# Patient Record
Sex: FEMALE | Race: WHITE | NOT HISPANIC OR LATINO | Employment: OTHER | URBAN - METROPOLITAN AREA
[De-identification: names, ages, dates, MRNs, and addresses within clinical notes are randomized per-mention and may not be internally consistent; named-entity substitution may affect disease eponyms.]

---

## 2017-01-19 ENCOUNTER — PATIENT OUTREACH (OUTPATIENT)
Dept: ADMINISTRATIVE | Facility: HOSPITAL | Age: 57
End: 2017-01-19

## 2017-01-19 NOTE — LETTER
January 27, 2017    Tanya Espinoza  [address] [address]             [facility]  [address]  [place] [address]  Phone: [phone] Dear Nghia Espinoza:    We have tried to reach you by mychart unsuccessfully.        [facility] is committed to your overall health.  To help you get the most out of each of your visits, we will review your information to make sure you are up to date on all of your recommended tests and/or procedures.       Dr. Yen has found that you may be due for     One-time Hepatitis C Screening lab test(a viral condition that can harm the liver)   Pneumonia vaccine   Pap smear   Mammogram   Colonoscopy     If you have had any of the above done at another facility, please bring the records or information with you so that your record at [facility] will be complete.     If you are currently taking medication, please bring it with you to your appointment for review.     Michael Ceballos LPN Clinical Care Coordinator   [facility] [address] [facility] [address]   [place] [address]   [phone] (p)   [phone] (f)

## 2017-01-23 RX ORDER — DEXTROAMPHETAMINE SACCHARATE, AMPHETAMINE ASPARTATE, DEXTROAMPHETAMINE SULFATE AND AMPHETAMINE SULFATE 5; 5; 5; 5 MG/1; MG/1; MG/1; MG/1
20 TABLET ORAL DAILY
Qty: 30 TABLET | Refills: 0 | Status: SHIPPED | OUTPATIENT
Start: 2017-01-23 | End: 2017-02-20 | Stop reason: SDUPTHER

## 2017-01-23 RX ORDER — DEXTROAMPHETAMINE SACCHARATE, AMPHETAMINE ASPARTATE MONOHYDRATE, DEXTROAMPHETAMINE SULFATE AND AMPHETAMINE SULFATE 7.5; 7.5; 7.5; 7.5 MG/1; MG/1; MG/1; MG/1
30 CAPSULE, EXTENDED RELEASE ORAL EVERY MORNING
Qty: 30 CAPSULE | Refills: 0 | Status: SHIPPED | OUTPATIENT
Start: 2017-01-23 | End: 2017-02-20 | Stop reason: SDUPTHER

## 2017-01-23 NOTE — TELEPHONE ENCOUNTER
----- Message from RT Michel sent at 1/23/2017  8:30 AM CST -----  Contact: pt    pt , requesting medication refill on adderal XR and instant relief, thanks.

## 2017-02-20 RX ORDER — DEXTROAMPHETAMINE SACCHARATE, AMPHETAMINE ASPARTATE, DEXTROAMPHETAMINE SULFATE AND AMPHETAMINE SULFATE 5; 5; 5; 5 MG/1; MG/1; MG/1; MG/1
20 TABLET ORAL DAILY
Qty: 30 TABLET | Refills: 0 | Status: SHIPPED | OUTPATIENT
Start: 2017-02-20 | End: 2017-03-17 | Stop reason: SDUPTHER

## 2017-02-20 RX ORDER — DEXTROAMPHETAMINE SACCHARATE, AMPHETAMINE ASPARTATE MONOHYDRATE, DEXTROAMPHETAMINE SULFATE AND AMPHETAMINE SULFATE 7.5; 7.5; 7.5; 7.5 MG/1; MG/1; MG/1; MG/1
30 CAPSULE, EXTENDED RELEASE ORAL EVERY MORNING
Qty: 30 CAPSULE | Refills: 0 | Status: SHIPPED | OUTPATIENT
Start: 2017-02-20 | End: 2017-03-17 | Stop reason: SDUPTHER

## 2017-02-20 NOTE — TELEPHONE ENCOUNTER
----- Message from Marielle Vaughn sent at 2/20/2017  9:36 AM CST -----  Contact: patient  Patient calling in regards to requesting a refill for Adderral. Please call when ready for . Please advise.   Call back .  Thanks!

## 2017-03-02 ENCOUNTER — PATIENT OUTREACH (OUTPATIENT)
Dept: ADMINISTRATIVE | Facility: HOSPITAL | Age: 57
End: 2017-03-02

## 2017-03-02 NOTE — LETTER
March 10, 2017    Tanya Espinoza  [address] [address]             [facility]  [address]  [place] [address]  Phone: [phone] Dear Nghia Espinoza:    We have tried to reach you by mychart unsuccessfully.        [facility] is committed to your overall health.  To help you get the most out of each of your visits, we will review your information to make sure you are up to date on all of your recommended tests and/or procedures.       Dr. Yen has found that you may be due for One time Hepatitis C screening lab test ( a viral condition that harms the liver), pneumonia vaccine, pap smear, mammogram, colonoscopy     If you have had any of the above done at another facility, please bring the records or information with you so that your record at [facility] will be complete.     If you are currently taking medication, please bring it with you to your appointment for review.     Michael Ceballos LPN Clinical Care Coordinator   [facility] [address] [facility]   [place] [address]   [phone] (p)   [phone] (f)

## 2017-03-17 ENCOUNTER — OFFICE VISIT (OUTPATIENT)
Dept: FAMILY MEDICINE | Facility: CLINIC | Age: 57
End: 2017-03-17
Payer: COMMERCIAL

## 2017-03-17 VITALS
WEIGHT: 190.94 LBS | BODY MASS INDEX: 28.94 KG/M2 | DIASTOLIC BLOOD PRESSURE: 89 MMHG | HEART RATE: 75 BPM | SYSTOLIC BLOOD PRESSURE: 129 MMHG | HEIGHT: 68 IN

## 2017-03-17 DIAGNOSIS — I48.20 CHRONIC A-FIB: ICD-10-CM

## 2017-03-17 DIAGNOSIS — F90.9 ATTENTION DEFICIT HYPERACTIVITY DISORDER (ADHD), UNSPECIFIED ADHD TYPE: ICD-10-CM

## 2017-03-17 DIAGNOSIS — I10 ESSENTIAL HYPERTENSION: Primary | ICD-10-CM

## 2017-03-17 DIAGNOSIS — F17.200 SMOKER: ICD-10-CM

## 2017-03-17 PROCEDURE — 99214 OFFICE O/P EST MOD 30 MIN: CPT | Mod: S$GLB,,, | Performed by: FAMILY MEDICINE

## 2017-03-17 PROCEDURE — 1160F RVW MEDS BY RX/DR IN RCRD: CPT | Mod: S$GLB,,, | Performed by: FAMILY MEDICINE

## 2017-03-17 PROCEDURE — 99999 PR PBB SHADOW E&M-EST. PATIENT-LVL III: CPT | Mod: PBBFAC,,, | Performed by: FAMILY MEDICINE

## 2017-03-17 PROCEDURE — 3074F SYST BP LT 130 MM HG: CPT | Mod: S$GLB,,, | Performed by: FAMILY MEDICINE

## 2017-03-17 PROCEDURE — 3079F DIAST BP 80-89 MM HG: CPT | Mod: S$GLB,,, | Performed by: FAMILY MEDICINE

## 2017-03-17 RX ORDER — METOPROLOL TARTRATE 25 MG/1
TABLET, FILM COATED ORAL
COMMUNITY
Start: 2017-02-26 | End: 2017-03-17 | Stop reason: SDUPTHER

## 2017-03-17 RX ORDER — METOPROLOL SUCCINATE 25 MG/1
TABLET, EXTENDED RELEASE ORAL
COMMUNITY
Start: 2017-03-15 | End: 2017-06-06

## 2017-03-17 RX ORDER — DEXTROAMPHETAMINE SACCHARATE, AMPHETAMINE ASPARTATE, DEXTROAMPHETAMINE SULFATE AND AMPHETAMINE SULFATE 5; 5; 5; 5 MG/1; MG/1; MG/1; MG/1
20 TABLET ORAL DAILY
Qty: 30 TABLET | Refills: 0 | Status: SHIPPED | OUTPATIENT
Start: 2017-03-17 | End: 2017-04-12 | Stop reason: SDUPTHER

## 2017-03-17 RX ORDER — DEXTROAMPHETAMINE SACCHARATE, AMPHETAMINE ASPARTATE MONOHYDRATE, DEXTROAMPHETAMINE SULFATE AND AMPHETAMINE SULFATE 7.5; 7.5; 7.5; 7.5 MG/1; MG/1; MG/1; MG/1
30 CAPSULE, EXTENDED RELEASE ORAL EVERY MORNING
Qty: 30 CAPSULE | Refills: 0 | Status: SHIPPED | OUTPATIENT
Start: 2017-03-17 | End: 2017-04-12 | Stop reason: SDUPTHER

## 2017-03-17 NOTE — MR AVS SNAPSHOT
[facility]  [address]  [facility] [address]  Phone: [phone]  Fax: [phone]                  Tanya Espinoza   3/17/2017 7:00 AM   Office Visit    Description:  Female : 1960   Provider:  Loki Yen MD   Department:  [facility]           Reason for Visit     Follow-up           Diagnoses this Visit        Comments    Essential hypertension    -  Primary            To Do List           Future Appointments        Provider Department Dept Phone    2017 8:00 AM Loki Yen MD [facility] [phone]      Goals (5 Years of Data)     None      Follow-Up and Disposition     Return in about 3 months (around 2017).       These Medications        Disp Refills Start End    dextroamphetamine-amphetamine (ADDERALL XR) 30 MG 24 hr capsule 30 capsule 0 3/17/2017     Take 1 capsule (30 mg total) by mouth every morning. - Oral    Pharmacy: [facility] #[id number] - [facility] [address] [facility] [address] [facility] [address] Ph #: [phone]       dextroamphetamine-amphetamine (ADDERALL) 20 mg tablet 30 tablet 0 3/17/2017     Take 1 tablet (20 mg total) by mouth once daily. - Oral    Pharmacy: [facility]/pharmacy #[id number] - [place] [address] [facility] [address] Ph #: [phone]         [facility]     [facility] Care Line -  Assistance  Registered nurses in the [facility] provide clinical advisement, health education, appointment booking, and other advisory services.  Call for this free service at [phone].             Medications           STOP taking these medications     metoprolol tartrate (LOPRESSOR) 25 MG tablet            Verify that the below list of medications is an accurate representation of the medications you are currently taking.  If none reported, the list may be blank. If incorrect, please contact your healthcare provider. Carry this list with you in case of emergency.           Current Medications      "dextroamphetamine-amphetamine (ADDERALL XR) 30 MG 24 hr capsule Take 1 capsule (30 mg total) by mouth every morning.    dextroamphetamine-amphetamine (ADDERALL) 20 mg tablet Take 1 tablet (20 mg total) by mouth once daily.    ibuprofen (ADVIL,MOTRIN) 400 MG tablet Take 1 tablet (400 mg total) by mouth 3 (three) times daily as needed for Other.    metoprolol succinate (TOPROL-XL) 25 MG 24 hr tablet     multivitamin (MULTIVITAMIN) per tablet Take 1 tablet by mouth once daily.      NAPROXEN SODIUM (ALEVE ORAL) Take 1 tablet by mouth every 12 (twelve) hours as needed.             Clinical Reference Information           Your Vitals Were     BP Pulse Height Weight BMI    129/89 75 5' 8" (1.727 m) 86.6 kg (190 lb 14.7 oz) 29.03 kg/m2      Blood Pressure          Most Recent Value    BP  129/89      Allergies as of 3/17/2017     Effexor [Venlafaxine]      Immunizations Administered on Date of Encounter - 3/17/2017     None      Orders Placed During Today's Visit     Future Labs/Procedures Expected by Expires    Comprehensive metabolic panel  9/13/2017 5/16/2018    Lipid panel  9/13/2017 3/17/2018      Smoking Cessation     If you would like to quit smoking:   You may be eligible for free services if you are a [place] resident and started smoking cigarettes before September 1, 1988.  Call the Smoking Cessation Trust ([facility]) toll free at (723) 210-1401 or (033) 126-0652.   Call 1-800-QUIT-NOW if you do not meet the above criteria.            Language Assistance Services     ATTENTION: Language assistance services are available, free of charge. Please call [phone].      ATENCIÓN: Si habla gato, tiene a ch disposición servicios gratuitos de asistencia lingüística. Llame al [phone].     CHÚ Ý: N?u b?n nói Ti?ng Vi?t, có các d?ch v? h? tr? ngôn ng? mi?n phí dành cho b?n. G?i s? [phone].         [facility] complies with applicable Federal civil rights laws and does not discriminate on " the basis of race, color, national origin, age, disability, or sex.

## 2017-03-26 NOTE — PROGRESS NOTES
A pleasant female, 57 years of age.  She has hypertension, smoking, atrial fib,   ADHD.  We addressed each issue, reviewed her medications.  We discussed health   maintenance and cancer screening.  She is due for some laboratory work.  She has   signed a controlled medication contract.  No syncope, chest pain or shortness   of breath.  No nausea or vomiting.  No polyuria, polydipsia or polyphagia.  No   acute psychiatric issues.    PHYSICAL EXAMINATION:  VITAL SIGNS:  BMI 29.  GENERAL:  Pleasant female.  Under a lot of stress with her family.  She was   polite and pleasant as usual.  NECK:  No carotid bruits.  CHEST:  Clear.  EXTREMITIES:  No peripheral edema.  ABDOMEN:  Soft, nontender.  No scleral icterus, jaundice or hepatosplenomegaly.    ASSESSMENT:  Hypertension, smoking, atrial fib and ADHD.    PLAN:  She is doing well overall.  She is due for CMP and lipids, smoking   cessation and followup.      NAN/JUAN RAMON  dd: 03/26/2017 19:01:49 (CDT)  td: 03/27/2017 00:58:48 (CDT)  Doc ID   #[id number]  Job ID #[id number]    CC:

## 2017-04-12 RX ORDER — DEXTROAMPHETAMINE SACCHARATE, AMPHETAMINE ASPARTATE MONOHYDRATE, DEXTROAMPHETAMINE SULFATE AND AMPHETAMINE SULFATE 7.5; 7.5; 7.5; 7.5 MG/1; MG/1; MG/1; MG/1
30 CAPSULE, EXTENDED RELEASE ORAL EVERY MORNING
Qty: 30 CAPSULE | Refills: 0 | Status: SHIPPED | OUTPATIENT
Start: 2017-04-17 | End: 2017-05-17 | Stop reason: SDUPTHER

## 2017-04-12 RX ORDER — DEXTROAMPHETAMINE SACCHARATE, AMPHETAMINE ASPARTATE, DEXTROAMPHETAMINE SULFATE AND AMPHETAMINE SULFATE 5; 5; 5; 5 MG/1; MG/1; MG/1; MG/1
20 TABLET ORAL DAILY
Qty: 30 TABLET | Refills: 0 | Status: SHIPPED | OUTPATIENT
Start: 2017-04-17 | End: 2017-05-11 | Stop reason: SDUPTHER

## 2017-04-12 NOTE — TELEPHONE ENCOUNTER
----- Message from Edel Stanley sent at 4/12/2017 10:00 AM CDT -----  Contact: self  Patient requesting refill on Adderall 30 mg and 20 mg called to [facility] on [address] in [place]   If any questions please call      Thanks

## 2017-04-14 ENCOUNTER — NURSE TRIAGE (OUTPATIENT)
Dept: ADMINISTRATIVE | Facility: CLINIC | Age: 57
End: 2017-04-14

## 2017-04-14 NOTE — TELEPHONE ENCOUNTER
"  Reason for Disposition   Caller has medication question only, adult not sick, and triager answers question    Answer Assessment - Initial Assessment Questions  1. SYMPTOMS: "Do you have any symptoms?"      -  2. SEVERITY: If symptoms are present, ask "Are they mild, moderate or severe?"      -  Patient states Adderall has the wrong start date. She states the start date should be 4/14/17, instead the date is 4/17/17. Patient states pharmacy will not fill medication. Advised patient that medication is a controlled medication and requires a paper prescription. Patient advised to go to urgent care or the wait until Monday for refill.    Protocols used: ST MEDICATION QUESTION CALL-A-AH    "

## 2017-05-11 RX ORDER — DEXTROAMPHETAMINE SACCHARATE, AMPHETAMINE ASPARTATE MONOHYDRATE, DEXTROAMPHETAMINE SULFATE AND AMPHETAMINE SULFATE 7.5; 7.5; 7.5; 7.5 MG/1; MG/1; MG/1; MG/1
30 CAPSULE, EXTENDED RELEASE ORAL EVERY MORNING
Qty: 30 CAPSULE | Refills: 0 | Status: CANCELLED | OUTPATIENT
Start: 2017-05-11

## 2017-05-11 NOTE — TELEPHONE ENCOUNTER
----- Message from Prisca Agrawal sent at 5/11/2017 12:24 PM CDT -----  Patient needs a refill of medications:Adderall (two prescriptions)please call patient back at [phone] to confirm.

## 2017-05-15 RX ORDER — DEXTROAMPHETAMINE SACCHARATE, AMPHETAMINE ASPARTATE, DEXTROAMPHETAMINE SULFATE AND AMPHETAMINE SULFATE 5; 5; 5; 5 MG/1; MG/1; MG/1; MG/1
20 TABLET ORAL DAILY
Qty: 30 TABLET | Refills: 0 | Status: SHIPPED | OUTPATIENT
Start: 2017-05-15 | End: 2017-06-09 | Stop reason: SDUPTHER

## 2017-05-15 RX ORDER — DEXTROAMPHETAMINE SACCHARATE, AMPHETAMINE ASPARTATE MONOHYDRATE, DEXTROAMPHETAMINE SULFATE AND AMPHETAMINE SULFATE 7.5; 7.5; 7.5; 7.5 MG/1; MG/1; MG/1; MG/1
30 CAPSULE, EXTENDED RELEASE ORAL EVERY MORNING
Qty: 30 CAPSULE | Refills: 0 | Status: CANCELLED | OUTPATIENT
Start: 2017-05-15

## 2017-05-15 NOTE — TELEPHONE ENCOUNTER
----- Message from Prisca Agrawal sent at 5/15/2017  1:41 PM CDT -----  Patient calling back concerning request for prescription refill/has not heard from anyone/patient is out of medication: Adderall/please call back at [phone] to advise.

## 2017-05-17 RX ORDER — DEXTROAMPHETAMINE SACCHARATE, AMPHETAMINE ASPARTATE MONOHYDRATE, DEXTROAMPHETAMINE SULFATE AND AMPHETAMINE SULFATE 7.5; 7.5; 7.5; 7.5 MG/1; MG/1; MG/1; MG/1
30 CAPSULE, EXTENDED RELEASE ORAL EVERY MORNING
Qty: 30 CAPSULE | Refills: 0 | Status: SHIPPED | OUTPATIENT
Start: 2017-05-17 | End: 2017-06-09 | Stop reason: SDUPTHER

## 2017-05-17 NOTE — TELEPHONE ENCOUNTER
----- Message from Marlene Hampton sent at 5/17/2017  1:53 PM CDT -----  Contact: [phone]  Patient requesting a refill on adderall time release.    Patient will be using   [facility]  [place] - [address]  [address] [place] [address]  Phone: [phone] Fax: [phone]    Please call patient at [phone]. Thanks!

## 2017-05-17 NOTE — TELEPHONE ENCOUNTER
----- Message from Prisca Agrawal sent at 5/17/2017  7:55 AM CDT -----  Patient needs a refill of medication: Adderall (time release)called into [facility]. Please order and call patient back at [phone] to confirm. Thanks!

## 2017-06-06 ENCOUNTER — OFFICE VISIT (OUTPATIENT)
Dept: PRIMARY CARE CLINIC | Facility: CLINIC | Age: 57
End: 2017-06-06
Payer: COMMERCIAL

## 2017-06-06 VITALS
OXYGEN SATURATION: 97 % | WEIGHT: 191.81 LBS | SYSTOLIC BLOOD PRESSURE: 120 MMHG | DIASTOLIC BLOOD PRESSURE: 90 MMHG | TEMPERATURE: 98 F | BODY MASS INDEX: 29.07 KG/M2 | HEART RATE: 96 BPM | HEIGHT: 68 IN

## 2017-06-06 DIAGNOSIS — R05.9 COUGH: ICD-10-CM

## 2017-06-06 DIAGNOSIS — J01.00 ACUTE MAXILLARY SINUSITIS, RECURRENCE NOT SPECIFIED: Primary | ICD-10-CM

## 2017-06-06 PROCEDURE — 99213 OFFICE O/P EST LOW 20 MIN: CPT | Mod: S$GLB,,, | Performed by: NURSE PRACTITIONER

## 2017-06-06 PROCEDURE — 99999 PR PBB SHADOW E&M-EST. PATIENT-LVL IV: CPT | Mod: PBBFAC,,, | Performed by: NURSE PRACTITIONER

## 2017-06-06 RX ORDER — PROMETHAZINE HYDROCHLORIDE AND CODEINE PHOSPHATE 6.25; 1 MG/5ML; MG/5ML
5 SOLUTION ORAL NIGHTLY PRN
Qty: 120 ML | Refills: 0 | Status: SHIPPED | OUTPATIENT
Start: 2017-06-06 | End: 2017-06-16

## 2017-06-06 RX ORDER — DEXTROAMPHETAMINE SACCHARATE, AMPHETAMINE ASPARTATE, DEXTROAMPHETAMINE SULFATE AND AMPHETAMINE SULFATE 5; 5; 5; 5 MG/1; MG/1; MG/1; MG/1
20 TABLET ORAL DAILY
Qty: 30 TABLET | Refills: 0 | Status: CANCELLED | OUTPATIENT
Start: 2017-06-06

## 2017-06-06 RX ORDER — DEXTROAMPHETAMINE SACCHARATE, AMPHETAMINE ASPARTATE MONOHYDRATE, DEXTROAMPHETAMINE SULFATE AND AMPHETAMINE SULFATE 7.5; 7.5; 7.5; 7.5 MG/1; MG/1; MG/1; MG/1
30 CAPSULE, EXTENDED RELEASE ORAL EVERY MORNING
Qty: 30 CAPSULE | Refills: 0 | Status: CANCELLED | OUTPATIENT
Start: 2017-06-06

## 2017-06-06 RX ORDER — DOXYCYCLINE 100 MG/1
100 CAPSULE ORAL 2 TIMES DAILY
Qty: 14 CAPSULE | Refills: 0 | Status: SHIPPED | OUTPATIENT
Start: 2017-06-06 | End: 2017-07-05

## 2017-06-06 RX ORDER — BENZONATATE 200 MG/1
200 CAPSULE ORAL 3 TIMES DAILY PRN
Qty: 30 CAPSULE | Refills: 1 | Status: SHIPPED | OUTPATIENT
Start: 2017-06-06 | End: 2017-06-16

## 2017-06-06 NOTE — Clinical Note
Loki Yen MD,  I saw your patient today in the [facility].  If you have any questions, please do not hesitate to contact me.  Thank you!  BREANNE Pettit

## 2017-06-06 NOTE — PROGRESS NOTES
Tanya Espinoza is a 57 y.o. female patient of Loki Yen MD who presents to the clinic today for   Chief Complaint   Patient presents with    Nasal Congestion     past week    Chest Congestion    Cough    Fatigue   .    HPI    Pt, who is not known to me, reports a new problem to me:  Nasal and chest congestion, cough, fatigue.  Started with congestion in the chest--cough prod of bad-tasting mucus.  No longer able to cough up the mucus.  Nasal congestion and pressure.  Rare nasal congestion-yellow phlegm.  No ST, earache.  Pressure in the ear.    These symptoms began 2+ weeks  ago and status is worsening over 4-5 days.     Symptoms are + acute.    Pt denies the following symptoms:  fever    Aggravating factors include nothing .    Relieving factors include sleeping .    OTC Medications tried are robitussin, Dayquil--helped a little with the cough, Nasonex.    Prescription medications taken for symptoms are none.    Pertinent medical history:  No h/o sinus or lung problems, no h/o allergies.    Smoking status:  1 PPD continues, dec since ill    ROS    Constitutional:   No  fever, ++ fatigue, decrease in appetite.    Head:   + headache  Ears:   No pain.  Eyes:  No sxs  Nose:   + sinus pain, + congestion, + runny nose, + post nasal drip.  Throat:  + ST pain.    Heart:  No palpitations, chest pain.    Lungs:  + difficulty breathing if active, + coughing, + bad-tasting sputum production, not wheezing.              Symptoms are community acquired.  No sick contact known.    GI/:  No sxs    MS:  No new bone, joint or muscle problems.    Skin:  No rashes, itching.      PAST MEDICAL HISTORY:  Past Medical History:   Diagnosis Date    ADHD (attention deficit hyperactivity disorder)     Anxiety        PAST SURGICAL HISTORY:  Past Surgical History:   Procedure Laterality Date    TONSILLECTOMY      TUBAL LIGATION         SOCIAL HISTORY:  Social History     Social History    Marital status:      Spouse  name: N/A    Number of children: N/A    Years of education: N/A     Occupational History    Not on file.     Social History Main Topics    Smoking status: Current Every Day Smoker     Packs/day: 1.00     Types: Cigarettes    Smokeless tobacco: Never Used    Alcohol use Yes      Comment: rarely    Drug use: No    Sexual activity: Not on file     Other Topics Concern    Not on file     Social History Narrative    No narrative on file       FAMILY HISTORY:  Family History   Problem Relation Age of Onset    Diabetes Mother     Osteoporosis Maternal Grandmother        ALLERGIES AND MEDICATIONS: updated and reviewed.  Review of patient's allergies indicates:   Allergen Reactions    Effexor [venlafaxine] Other (See Comments)     Bruising all over body and elevated blood pressure     Current Outpatient Prescriptions   Medication Sig Dispense Refill    dextroamphetamine-amphetamine (ADDERALL XR) 30 MG 24 hr capsule Take 1 capsule (30 mg total) by mouth every morning. 30 capsule 0    dextroamphetamine-amphetamine (ADDERALL) 20 mg tablet Take 1 tablet (20 mg total) by mouth once daily. 30 tablet 0    ibuprofen (ADVIL,MOTRIN) 400 MG tablet Take 1 tablet (400 mg total) by mouth 3 (three) times daily as needed for Other. 30 tablet 0    multivitamin (MULTIVITAMIN) per tablet Take 1 tablet by mouth once daily.        NAPROXEN SODIUM (ALEVE ORAL) Take 1 tablet by mouth every 12 (twelve) hours as needed.         No current facility-administered medications for this visit.        PHYSICAL EXAM    Alert, coop 57 y.o. female patient in no acute distress, appears fatigued.    Vitals:    06/06/17 1400   BP: (!) 120/90   Pulse: 96   Temp: 98.4 °F (36.9 °C)     VS reviewed.  VS stable.  CC, nursing note, medications & PMH all reviewed today.    Head:  Normocephalic, atraumatic.    EENT:  EACs patent.  TMs no erythema, normal LR, no effusions, no TM perforation.                              Eye lids normal, no discharge  present.       Sinus tenderness to palp--none.               Nares--no edema.               Pharynx not injected.                Tonsils not erythematous , not enlarged, no exudate present.    Bilat small anterior, no posterior cervical lymph nodes palpable.    No submental, submandibular or supraclavicular lymph nodes palp.             Resp:  Respirations even, unlabored.  Harsh coughing during the visit.   Lungs CTA bilat.  No wheezing.  No crackles.  Moves air to bases bilat.    Heart:  RRR, no MRG.    MS:  Ambulates normally.             NEURO:  Alert and oriented x 4.  Responds appropriately during interaction.    Skin:  Warm, dry, color good.    Acute maxillary sinusitis, recurrence not specified  -     doxycycline (MONODOX) 100 MG capsule; Take 1 capsule (100 mg total) by mouth 2 (two) times daily.  Dispense: 14 capsule; Refill: 0    Cough  -     benzonatate (TESSALON) 200 MG capsule; Take 1 capsule (200 mg total) by mouth 3 (three) times daily as needed for Cough.  Dispense: 30 capsule; Refill: 1  -     promethazine-codeine 6.25-10 mg/5 ml (PHENERGAN WITH CODEINE) 6.25-10 mg/5 mL syrup; Take 5 mLs by mouth nightly as needed for Cough.  Dispense: 120 mL; Refill: 0      Pt today presents with 2+ weeks of coughing and congestion, fatigue, that has worsened.    This is a new problem to me.  No work up is planned.        Pt advised to perform comfort measures recommended on patient instruction sheet .    If not better in 3-5 days, the patient is advised to call us.  If worse or concerns, the patient is advised to call us.  Explained exam findings, diagnosis and treatment plan to patient.  Questions answered and patient states understanding.

## 2017-06-06 NOTE — PATIENT INSTRUCTIONS
Sinus infections have many causes, including viral and bacterial infections.    After nearly 2 weeks, antibiotics are indicated.  Fill the prescription for doxycycline.    Plain mucinex and antihistamine during the daytime with the benzonatate cough capsules.  At bedtime only the cough medicine as needed.    The following medications have been prescribed for you today:  Flonase or Nasonex nasal spray to reduce swelling and congestion in the sinuses.    In addition, the following symptomatic measures may be helpful:    HOME CARE(1):  · Drink plenty of water, hot tea, and other liquids to stay well hydrated. This thins the mucus and promotes sinus drainage.  · Apply heat to the painful areas of the face. Use a towel soaked in warm water. Or,  the shower and direct the hot spray onto your face. This is a good way to inhale warm water vapor and get heat on your face at the same time. (Cover your mouth and nose with your hands so you can still breathe as you do this.)  · Use a vaporizer with products such as Vicks VapoRub (contains menthol) at night. Suck on peppermint, menthol or eucalyptus hard candies during the day.  · Saline nasal spray liberally.  · Salt water gargles  · Sleep with head elevated  · Decongestants if you don't have high blood pressure.  People with high blood pressure should use cold medications with the HBP designation, as these are safe for their use.  · An expectorant containing guaifenesin (such as Robitussin, Mucinex), helps to thin the mucus and promote drainage from the sinuses.  · Antihistamines are useful if allergies are a cause of your sinusitis. The mildest one is chlorpheniramine (available without a prescription). The dose for adults is 8-12mg three times a day. [NOTE: Do not use chlorpheniramine if you have glaucoma or if you are a man with trouble urinating due to an enlarged prostate.] Claritin (loratidine) is an antihistamine that causes less drowsiness and is a good  alternative for daytime use.  Allegra is another medication that is similar.  · You may use acetaminophen (Tylenol) or ibuprofen (Motrin, Advil) to control pain, unless another pain medicine was prescribed. [ NOTE: If you have chronic liver or kidney disease or ever had a stomach ulcer, talk with your doctor before using these medicines.] (Aspirin should never be used in anyone under 18 years of age who is ill with a fever. It may cause severe liver damage.)  · Finish the full course of antibiotic, if prescribed, even if you are feeling better after a few days.  FOLLOW UP with your primary care provider in one week or as instructed if not improving.  GET PROMPT MEDICAL ATTENTION if any of the following occur:  · Facial pain or headache becomes more severe  · Stiff neck  · Unusual drowsiness or confusion, or not acting like your normal self  · Swelling of the forehead or eyelids  · Vision problems including blurred or double vision  · Fever of 100.4ºF (38ºC) or higher, or as directed by your healthcare provider  · Seizure  (1) © 7109-7148 [facility], [address] [place] [address], [place] [address]. All rights reserved. This information is not intended as a substitute for professional medical care. Always follow your healthcare professional's instructions.    If you are not better in 5 days, if worse or you have concerns or questions, please do not hesitate to call.  You can reach us at [phone] Monday through Friday (except holidays) 12 nooon to 5 p.m.    Thank you for using the [facility]!    Diane Garcia, FREDERICK, CNP, FNP-BC  [facility]  [facility]

## 2017-06-08 NOTE — TELEPHONE ENCOUNTER
Called and spoke with patient and informed her that medication refill request has been sent to Dr Yen she verbalized that she understood

## 2017-06-08 NOTE — TELEPHONE ENCOUNTER
----- Message from Marlene Hampton sent at 6/6/2017  2:32 PM CDT -----  Contact: [phone]  Patient is requesting a call back from the nurse.  Please call the patient upon request at phone number [phone].

## 2017-06-09 RX ORDER — DEXTROAMPHETAMINE SACCHARATE, AMPHETAMINE ASPARTATE, DEXTROAMPHETAMINE SULFATE AND AMPHETAMINE SULFATE 5; 5; 5; 5 MG/1; MG/1; MG/1; MG/1
20 TABLET ORAL DAILY
Qty: 30 TABLET | Refills: 0 | Status: SHIPPED | OUTPATIENT
Start: 2017-06-15 | End: 2017-07-05 | Stop reason: SDUPTHER

## 2017-06-09 RX ORDER — DEXTROAMPHETAMINE SACCHARATE, AMPHETAMINE ASPARTATE MONOHYDRATE, DEXTROAMPHETAMINE SULFATE AND AMPHETAMINE SULFATE 7.5; 7.5; 7.5; 7.5 MG/1; MG/1; MG/1; MG/1
30 CAPSULE, EXTENDED RELEASE ORAL EVERY MORNING
Qty: 30 CAPSULE | Refills: 0 | Status: SHIPPED | OUTPATIENT
Start: 2017-06-15 | End: 2017-07-05 | Stop reason: SDUPTHER

## 2017-06-09 NOTE — TELEPHONE ENCOUNTER
----- Message from Edel Stanley sent at 6/9/2017  3:19 PM CDT -----  Contact: self  Patient calling to check on the status of her prescription for Adderall  Nothing at the pharmacy    Please call  to advise.     Thanks

## 2017-06-20 ENCOUNTER — PATIENT OUTREACH (OUTPATIENT)
Dept: ADMINISTRATIVE | Facility: HOSPITAL | Age: 57
End: 2017-06-20

## 2017-06-20 NOTE — LETTER
June 26, 2017    Tanya Espinoza  [address] [address]             [facility]  [address]  [place] [address]  Phone: [phone] Dear Nghia Espinoza:    We have tried to reach you by mychart unsuccessfully.    [facility] committed to your overall health.  To help you get the most out of each of your visits, we will review your information to make sure you are up to date on all of your recommended tests and/or procedures.       Dr. Wendy Rodriguez has found that you may be due for a pap smear, mammogram, colon cancer screening, hepatitis C screening, and possibly a pneumonia immunization.     If you have had any of the above done at another facility, please bring the records or information with you so that your record at [facility] will be complete.  If you would like to schedule any of these, please contact me.     If you are currently taking medication, please bring it with you to your appointment for review.     If you have any questions or concerns, please don't hesitate to call.    Thank you for letting us care for you,  Erica Dallas LPN Clinical Care Coordinator  [facility] [place]  [phone]

## 2017-07-05 ENCOUNTER — TELEPHONE (OUTPATIENT)
Dept: FAMILY MEDICINE | Facility: CLINIC | Age: 57
End: 2017-07-05

## 2017-07-05 ENCOUNTER — OFFICE VISIT (OUTPATIENT)
Dept: FAMILY MEDICINE | Facility: CLINIC | Age: 57
End: 2017-07-05
Payer: COMMERCIAL

## 2017-07-05 VITALS
TEMPERATURE: 98 F | BODY MASS INDEX: 29.07 KG/M2 | RESPIRATION RATE: 17 BRPM | WEIGHT: 191.81 LBS | HEART RATE: 83 BPM | DIASTOLIC BLOOD PRESSURE: 84 MMHG | SYSTOLIC BLOOD PRESSURE: 122 MMHG | HEIGHT: 68 IN | OXYGEN SATURATION: 98 %

## 2017-07-05 DIAGNOSIS — Z12.31 ENCOUNTER FOR SCREENING MAMMOGRAM FOR MALIGNANT NEOPLASM OF BREAST: ICD-10-CM

## 2017-07-05 DIAGNOSIS — Z23 NEED FOR PNEUMOCOCCAL VACCINATION: ICD-10-CM

## 2017-07-05 DIAGNOSIS — F98.8 ADD (ATTENTION DEFICIT DISORDER): ICD-10-CM

## 2017-07-05 DIAGNOSIS — Z11.59 ENCOUNTER FOR HEPATITIS C SCREENING TEST FOR LOW RISK PATIENT: ICD-10-CM

## 2017-07-05 DIAGNOSIS — F15.20 STIMULANT DEPENDENCE: ICD-10-CM

## 2017-07-05 DIAGNOSIS — Z12.11 SCREEN FOR COLON CANCER: ICD-10-CM

## 2017-07-05 DIAGNOSIS — I48.0 PAROXYSMAL A-FIB: Primary | ICD-10-CM

## 2017-07-05 DIAGNOSIS — Z72.0 TOBACCO ABUSE: ICD-10-CM

## 2017-07-05 LAB
ALBUMIN SERPL BCP-MCNC: 3.3 G/DL
ALP SERPL-CCNC: 79 U/L
ALT SERPL W/O P-5'-P-CCNC: 16 U/L
ANION GAP SERPL CALC-SCNC: 5 MMOL/L
AST SERPL-CCNC: 20 U/L
BASOPHILS # BLD AUTO: 0.06 K/UL
BASOPHILS NFR BLD: 0.9 %
BILIRUB SERPL-MCNC: 0.4 MG/DL
BUN SERPL-MCNC: 18 MG/DL
CALCIUM SERPL-MCNC: 9.4 MG/DL
CHLORIDE SERPL-SCNC: 105 MMOL/L
CHOLEST/HDLC SERPL: 3.4 {RATIO}
CO2 SERPL-SCNC: 27 MMOL/L
CREAT SERPL-MCNC: 0.8 MG/DL
DIFFERENTIAL METHOD: ABNORMAL
EOSINOPHIL # BLD AUTO: 0.4 K/UL
EOSINOPHIL NFR BLD: 6.5 %
ERYTHROCYTE [DISTWIDTH] IN BLOOD BY AUTOMATED COUNT: 11.9 %
EST. GFR  (AFRICAN AMERICAN): >60 ML/MIN/1.73 M^2
EST. GFR  (NON AFRICAN AMERICAN): >60 ML/MIN/1.73 M^2
GLUCOSE SERPL-MCNC: 104 MG/DL
HCT VFR BLD AUTO: 42.6 %
HDL/CHOLESTEROL RATIO: 29 %
HDLC SERPL-MCNC: 217 MG/DL
HDLC SERPL-MCNC: 63 MG/DL
HGB BLD-MCNC: 14.6 G/DL
LDLC SERPL CALC-MCNC: 128.6 MG/DL
LYMPHOCYTES # BLD AUTO: 1.9 K/UL
LYMPHOCYTES NFR BLD: 29.3 %
MCH RBC QN AUTO: 30.7 PG
MCHC RBC AUTO-ENTMCNC: 34.3 %
MCV RBC AUTO: 90 FL
MONOCYTES # BLD AUTO: 0.5 K/UL
MONOCYTES NFR BLD: 7.2 %
NEUTROPHILS # BLD AUTO: 3.6 K/UL
NEUTROPHILS NFR BLD: 56.1 %
NONHDLC SERPL-MCNC: 154 MG/DL
PLATELET # BLD AUTO: 248 K/UL
PMV BLD AUTO: 13.1 FL
POTASSIUM SERPL-SCNC: 4.5 MMOL/L
PROT SERPL-MCNC: 7 G/DL
RBC # BLD AUTO: 4.75 M/UL
SODIUM SERPL-SCNC: 137 MMOL/L
TRIGL SERPL-MCNC: 127 MG/DL
TSH SERPL DL<=0.005 MIU/L-ACNC: 1.84 UIU/ML
WBC # BLD AUTO: 6.42 K/UL

## 2017-07-05 PROCEDURE — 80053 COMPREHEN METABOLIC PANEL: CPT

## 2017-07-05 PROCEDURE — 36415 COLL VENOUS BLD VENIPUNCTURE: CPT | Mod: S$GLB,,, | Performed by: INTERNAL MEDICINE

## 2017-07-05 PROCEDURE — 90471 IMMUNIZATION ADMIN: CPT | Mod: S$GLB,,, | Performed by: INTERNAL MEDICINE

## 2017-07-05 PROCEDURE — 99214 OFFICE O/P EST MOD 30 MIN: CPT | Mod: S$GLB,,, | Performed by: INTERNAL MEDICINE

## 2017-07-05 PROCEDURE — 86803 HEPATITIS C AB TEST: CPT

## 2017-07-05 PROCEDURE — 84443 ASSAY THYROID STIM HORMONE: CPT

## 2017-07-05 PROCEDURE — 90732 PPSV23 VACC 2 YRS+ SUBQ/IM: CPT | Mod: S$GLB,,, | Performed by: INTERNAL MEDICINE

## 2017-07-05 PROCEDURE — 85025 COMPLETE CBC W/AUTO DIFF WBC: CPT

## 2017-07-05 PROCEDURE — 80061 LIPID PANEL: CPT

## 2017-07-05 RX ORDER — DEXTROAMPHETAMINE SACCHARATE, AMPHETAMINE ASPARTATE MONOHYDRATE, DEXTROAMPHETAMINE SULFATE AND AMPHETAMINE SULFATE 7.5; 7.5; 7.5; 7.5 MG/1; MG/1; MG/1; MG/1
30 CAPSULE, EXTENDED RELEASE ORAL EVERY MORNING
Qty: 30 CAPSULE | Refills: 0 | Status: SHIPPED | OUTPATIENT
Start: 2017-07-13 | End: 2017-08-04 | Stop reason: SDUPTHER

## 2017-07-05 RX ORDER — DEXTROAMPHETAMINE SACCHARATE, AMPHETAMINE ASPARTATE, DEXTROAMPHETAMINE SULFATE AND AMPHETAMINE SULFATE 5; 5; 5; 5 MG/1; MG/1; MG/1; MG/1
20 TABLET ORAL DAILY
Qty: 30 TABLET | Refills: 0 | Status: SHIPPED | OUTPATIENT
Start: 2017-07-12 | End: 2017-08-04 | Stop reason: SDUPTHER

## 2017-07-05 RX ORDER — ASPIRIN 81 MG/1
81 TABLET ORAL DAILY
Refills: 0 | Status: ON HOLD | COMMUNITY
Start: 2017-07-05 | End: 2021-04-25

## 2017-07-05 NOTE — PROGRESS NOTES
Venipuncture performed with 21 gauge butterfly, x's 2 attempt,  to R Basilic/L Basilic vein.  Specimens collected per orders.      Pressure dressing applied to site, instructed patient to remove dressing in 10-15 minutes, OK to re-adjust dressing if pressure causing any discomfort, to observe closely for numbness and/or discoloration to hand or fingers, and to notify provider if bleeding persists after applying constant pressure lasting 30 minutes.

## 2017-07-05 NOTE — TELEPHONE ENCOUNTER
----- Message from Josiane Varela sent at 7/5/2017  3:14 PM CDT -----  Contact: Sandy with [facility]  Sandy with [facility] called advising that they received a request for records for the patient when she saw a Dr. Moreno.  Per Sandy, they do not manage records for Dr. Moreno and it will need to be sent to that office. She did not have the fax number as well.  Thank you.

## 2017-07-05 NOTE — PROGRESS NOTES
"Subjective:       Patient ID: Tanya Espinoza is a 57 y.o. female.    Current Outpatient Prescriptions   Medication Sig Dispense Refill    [START ON 7/13/2017] dextroamphetamine-amphetamine (ADDERALL XR) 30 MG 24 hr capsule Take 1 capsule (30 mg total) by mouth every morning. 30 capsule 0    [START ON 7/12/2017] dextroamphetamine-amphetamine (ADDERALL) 20 mg tablet Take 1 tablet (20 mg total) by mouth once daily. 30 tablet 0    ibuprofen (ADVIL,MOTRIN) 400 MG tablet Take 1 tablet (400 mg total) by mouth 3 (three) times daily as needed for Other. 30 tablet 0    multivitamin (MULTIVITAMIN) per tablet Take 1 tablet by mouth once daily.        NAPROXEN SODIUM (ALEVE ORAL) Take 1 tablet by mouth every 12 (twelve) hours as needed.        aspirin (ECOTRIN) 81 MG EC tablet Take 1 tablet (81 mg total) by mouth once daily.  0     No current facility-administered medications for this visit.      Chief Complaint: Establish Care, Farshad Patient  She is here today to establish care.  She is a former patient of Dr. Bailey.     She has paroxysmal Afib. She had 15 years of intermittent "panic attacks" with racing heart and shortness of breath. About one year ago she went to the ER during one of these attacks and found to have Afib.  She was started on metoprolol and elquis for 6 months then underwent ablation therapy.  She was able to get off all medication but she will take aspirin occasionally.  She denies any palpitations or racing heart rate. No shortness of breath or chest pain. She says she did have an echocardiogram and nuclear stress test that she thinks were normal.  She had all her workup done at the [facility] which is now closed. She just recently establish with Dr. Heath 3 months ago. He would like to implant a loop recorder and get a tilt table test.      She has ADD dx 20 years ago. She was on treatment for a while when dx then came off of treatment for many years.  Four years ago she restarted. "  She was originally dx through psychiatry.  She says before treatment she was very distractible and would often forget to pay bills.  She would procrastinate and had trouble with her memory because her mind would go so fast.  She often missed appointments and did not finish work. It was interfering in her job.  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 She works all hours of the day and usually 6 days a week.  She takes her long acting adderrall at 6:30 am and then her short acting dose of 11 am.  She takes everyday day.  She denies any side effects and adds that it has helped her to be so much more productive and organized.      She smokes tobacco 1 ppd for last 40 years. She has tried to quit in the past but always restarts.      Colonoscopy---never  Mammogram----many years  Pap-----many years  Tdap---8/2016  Influenza vaccine---none  Pneumovax 23----none  Shingles vaccine-----none  Labs---2014    Review of Systems   Constitutional: Negative for appetite change, fatigue, fever and unexpected weight change.   HENT: Negative for congestion, ear pain, hearing loss, sore throat and trouble swallowing.    Eyes: Negative for pain and visual disturbance.   Respiratory: Negative for cough, chest tightness, shortness of breath and wheezing.    Cardiovascular: Negative for chest pain, palpitations and leg swelling.   Gastrointestinal: Negative for abdominal pain, blood in stool, constipation, diarrhea, nausea and vomiting.   Endocrine: Negative for polyuria.   Genitourinary: Negative for dysuria and hematuria.   Musculoskeletal: Negative for arthralgias, back pain and myalgias.   Skin: Negative for rash.   Neurological: Negative for dizziness, weakness, numbness and headaches.   Hematological: Does not bruise/bleed easily.   Psychiatric/Behavioral: Negative for dysphoric mood, sleep disturbance and suicidal ideas. The patient is not nervous/anxious.        Objective:     "  Vitals:    07/05/17 0846   BP: 122/84   BP Location: Right arm   Patient Position: Sitting   BP Method: Manual   Pulse: 83   Resp: 17   Temp: 98.2 °F (36.8 °C)   TempSrc: Oral   SpO2: 98%   Weight: 87 kg (191 lb 12.8 oz)   Height: 5' 8" (1.727 m)     Body mass index is 29.16 kg/m².  Physical Exam    General appearance: No acute distress, cooperative  Eyes: PERRL, EOMI, conjunctiva clear  Ears: normal external ear and pinna, tm clear without drainage, canals clear  Nose: Normal mucosa without drainage  Throat: no exudates or erythema, tonsils not enlarged  Mouth: no sores or lesions, moist mucous membranes, good dentition  Neck: FROM, soft, supple, no thyromegaly, no bruits  Lymph: no anterior or posterior cervical adenopathy  Heart::  Regular rate and rhythm, no murmur  Lung: Clear to ascultation bilaterally, no wheezing, no rales, no rhonchi, no distress  Abdomen: Soft, nontender, no distention, no hepatosplenomegaly, bowel sounds normal, no guarding, no rebound, no peritoneal signs  Skin: no rashes, no lesions  Extremities: no edema, no cyanosis  Neuro: CN 2-12 intact, 5/5 muscle strength upper and lower extremity bilaterally, 2+ DTRs UE and LE bilaterally, normal gait, normal sensation  Peripheral pulses: 2+ pedal pulses bilaterally, good perfusion and color  Musculoskeletal: FROM, good strenth, no tenderness  Joint: normal appearance, no swelling, no warmth, no deformity in all joints    Assessment:       1. Paroxysmal a-fib    2. ADD (attention deficit disorder)    3. Stimulant dependence    4. Tobacco abuse    5. Encounter for screening mammogram for malignant neoplasm of breast    6. Encounter for hepatitis C screening test for low risk patient    7. Screen for colon cancer    8. Need for pneumococcal vaccination        Plan:       Paroxysmal a-fib  NSR on exam today.  Advised to start aspirin daily.  Will check labs today.  She is following with cardiology and is to get a loop recorder and tilt table test. "  She is asymptomatic.    -     aspirin (ECOTRIN) 81 MG EC tablet; Take 1 tablet (81 mg total) by mouth once daily.; Refill: 0  -     Lipid panel  -     CBC auto differential  -     Comprehensive metabolic panel  -     TSH    ADD (attention deficit disorder)  Good control on her reigmen and has been taking for the last 4 years.  Will refer to [facility] to manage.  No side effects.    -     Ambulatory referral to Psychiatry  -     dextroamphetamine-amphetamine (ADDERALL) 20 mg tablet; Take 1 tablet (20 mg total) by mouth once daily.  Dispense: 30 tablet; Refill: 0  -     dextroamphetamine-amphetamine (ADDERALL XR) 30 MG 24 hr capsule; Take 1 capsule (30 mg total) by mouth every morning.  Dispense: 30 capsule; Refill: 0    Stimulant dependence  No evidence of abuse by .  Her last fill was 6/15/17 #30 on both adderrall XR and adderall short acting.  Rx given today with stop date of 8/13/17.      Tobacco abuse  Strongly advised to stop smoking.     Encounter for screening mammogram for malignant neoplasm of breast  -     Mammo Digital Screening Bilat with CAD; Future; Expected date: 07/05/2017    Encounter for hepatitis C screening test for low risk patient  -     Hepatitis C antibody    Screen for colon cancer  -     Case request GI: COLONOSCOPY    Need for pneumococcal vaccination  -     Pneumococcal Polysaccharide Vaccine (23 Valent) (SQ/IM)    Return in about 6 months (around 1/5/2018) for chronic medical issues and needs a pap sooner.

## 2017-07-06 LAB — HCV AB SERPL QL IA: NEGATIVE

## 2017-07-11 ENCOUNTER — PATIENT MESSAGE (OUTPATIENT)
Dept: FAMILY MEDICINE | Facility: CLINIC | Age: 57
End: 2017-07-11

## 2017-07-21 ENCOUNTER — PATIENT OUTREACH (OUTPATIENT)
Dept: ADMINISTRATIVE | Facility: HOSPITAL | Age: 57
End: 2017-07-21

## 2017-07-21 NOTE — LETTER
July 27, 2017    Tanya Espinoza  [address] [address]             [facility]  [address]  [facility] [address]  Phone: [phone] Dear Nghia Espinoza:    We have tried to reach you by mychart unsuccessfully.    [facility] is committed to your overall health.  To help you get the most out of each of your visits, we will review your information to make sure you are up to date on all of your recommended tests and/or procedures.       Dr. Wendy Rodriguez has found that you may be due for colon cancer screening.     If you have had any of the above done at another facility, please bring the records or information with you so that your record at [facility] will be complete.  If you would like to schedule any of these, please contact me.     If you are currently taking medication, please bring it with you to your appointment for review.     If you have any questions or concerns, please don't hesitate to call.    Thank you for letting us care for you,  Erica Dallas LPN Clinical Care Coordinator  [facility]  [phone]

## 2017-08-03 ENCOUNTER — TELEPHONE (OUTPATIENT)
Dept: RADIOLOGY | Facility: HOSPITAL | Age: 57
End: 2017-08-03

## 2017-08-03 ENCOUNTER — HOSPITAL ENCOUNTER (OUTPATIENT)
Dept: RADIOLOGY | Facility: HOSPITAL | Age: 57
Discharge: HOME | End: 2017-08-03
Attending: INTERNAL MEDICINE
Payer: COMMERCIAL

## 2017-08-03 DIAGNOSIS — Z12.31 ENCOUNTER FOR SCREENING MAMMOGRAM FOR MALIGNANT NEOPLASM OF BREAST: ICD-10-CM

## 2017-08-03 PROCEDURE — 77067 SCR MAMMO BI INCL CAD: CPT | Mod: 26,,, | Performed by: RADIOLOGY

## 2017-08-03 PROCEDURE — 77067 SCR MAMMO BI INCL CAD: CPT | Mod: TC

## 2017-08-03 PROCEDURE — 77063 BREAST TOMOSYNTHESIS BI: CPT | Mod: 26,,, | Performed by: RADIOLOGY

## 2017-08-04 ENCOUNTER — TELEPHONE (OUTPATIENT)
Dept: DERMATOLOGY | Facility: CLINIC | Age: 57
End: 2017-08-04

## 2017-08-04 ENCOUNTER — HOSPITAL ENCOUNTER (OUTPATIENT)
Dept: RADIOLOGY | Facility: HOSPITAL | Age: 57
Discharge: HOME | End: 2017-08-04
Attending: INTERNAL MEDICINE
Payer: COMMERCIAL

## 2017-08-04 ENCOUNTER — OFFICE VISIT (OUTPATIENT)
Dept: FAMILY MEDICINE | Facility: CLINIC | Age: 57
End: 2017-08-04
Payer: COMMERCIAL

## 2017-08-04 ENCOUNTER — HOSPITAL ENCOUNTER (OUTPATIENT)
Dept: RADIOLOGY | Facility: HOSPITAL | Age: 57
Discharge: HOME | End: 2017-08-04
Attending: FAMILY MEDICINE
Payer: COMMERCIAL

## 2017-08-04 VITALS
WEIGHT: 190.94 LBS | BODY MASS INDEX: 28.94 KG/M2 | TEMPERATURE: 98 F | DIASTOLIC BLOOD PRESSURE: 76 MMHG | OXYGEN SATURATION: 97 % | HEART RATE: 94 BPM | HEIGHT: 68 IN | SYSTOLIC BLOOD PRESSURE: 128 MMHG | RESPIRATION RATE: 18 BRPM

## 2017-08-04 DIAGNOSIS — R92.8 ABNORMAL MAMMOGRAM OF RIGHT BREAST: ICD-10-CM

## 2017-08-04 DIAGNOSIS — R92.8 ABNORMAL MAMMOGRAM OF LEFT BREAST: ICD-10-CM

## 2017-08-04 DIAGNOSIS — Z01.419 WELL FEMALE EXAM WITH ROUTINE GYNECOLOGICAL EXAM: Primary | ICD-10-CM

## 2017-08-04 DIAGNOSIS — F90.9 ATTENTION DEFICIT HYPERACTIVITY DISORDER (ADHD), UNSPECIFIED ADHD TYPE: ICD-10-CM

## 2017-08-04 DIAGNOSIS — B37.31 VAGINAL CANDIDA: ICD-10-CM

## 2017-08-04 DIAGNOSIS — L98.9 SKIN LESION: ICD-10-CM

## 2017-08-04 LAB
CTP QC/QA: YES
FECAL OCCULT BLOOD, POC: NEGATIVE

## 2017-08-04 PROCEDURE — 77061 BREAST TOMOSYNTHESIS UNI: CPT | Mod: TC,LT

## 2017-08-04 PROCEDURE — 76642 ULTRASOUND BREAST LIMITED: CPT | Mod: TC,PO,LT

## 2017-08-04 PROCEDURE — 82270 OCCULT BLOOD FECES: CPT | Mod: ,,, | Performed by: INTERNAL MEDICINE

## 2017-08-04 PROCEDURE — 87624 HPV HI-RISK TYP POOLED RSLT: CPT

## 2017-08-04 PROCEDURE — 88175 CYTOPATH C/V AUTO FLUID REDO: CPT

## 2017-08-04 PROCEDURE — 77061 BREAST TOMOSYNTHESIS UNI: CPT | Mod: 26,LT,, | Performed by: RADIOLOGY

## 2017-08-04 PROCEDURE — 77065 DX MAMMO INCL CAD UNI: CPT | Mod: 26,LT,, | Performed by: RADIOLOGY

## 2017-08-04 PROCEDURE — 99396 PREV VISIT EST AGE 40-64: CPT | Mod: S$GLB,,, | Performed by: INTERNAL MEDICINE

## 2017-08-04 PROCEDURE — 76642 ULTRASOUND BREAST LIMITED: CPT | Mod: 26,LT,, | Performed by: RADIOLOGY

## 2017-08-04 RX ORDER — DEXTROAMPHETAMINE SACCHARATE, AMPHETAMINE ASPARTATE, DEXTROAMPHETAMINE SULFATE AND AMPHETAMINE SULFATE 5; 5; 5; 5 MG/1; MG/1; MG/1; MG/1
20 TABLET ORAL DAILY
Qty: 30 TABLET | Refills: 0 | Status: SHIPPED | OUTPATIENT
Start: 2017-08-12 | End: 2017-09-07 | Stop reason: SDUPTHER

## 2017-08-04 RX ORDER — FLUCONAZOLE 150 MG/1
150 TABLET ORAL DAILY
Qty: 3 TABLET | Refills: 0 | Status: SHIPPED | OUTPATIENT
Start: 2017-08-04 | End: 2017-08-07

## 2017-08-04 RX ORDER — DEXTROAMPHETAMINE SACCHARATE, AMPHETAMINE ASPARTATE MONOHYDRATE, DEXTROAMPHETAMINE SULFATE AND AMPHETAMINE SULFATE 7.5; 7.5; 7.5; 7.5 MG/1; MG/1; MG/1; MG/1
30 CAPSULE, EXTENDED RELEASE ORAL EVERY MORNING
Qty: 30 CAPSULE | Refills: 0 | Status: SHIPPED | OUTPATIENT
Start: 2017-08-12 | End: 2017-09-07 | Stop reason: SDUPTHER

## 2017-08-04 NOTE — PROGRESS NOTES
Subjective:       Patient ID: Tanya Espinoza is a 57 y.o. female.    Chief Complaint: Gynecologic Exam; Results, 17 labs; and Discuss referral, Dr. Dallas    Tanya Espinoza is a 57 y.o.  who presents for an annual exam. The patient has no complaints today.   She needs a new referral to ADHD management center because she had trouble getting into the one given at her last appt. She needs a refill today.     Last pap: 5 years ago  Pap results:  ASCUS with POSITIVE high risk HPV, had bx negative  Last mammogram: 8/3/17---left breast mass, then subsequent diagnostic mammogram with u/s neg repeat in one year  Mammogram results:   See above.  LMP: 17    Concerns  Vaginal discharge or odor: No  Abnormal bleeding: No  Pelvic Pain:  No  Sexually active:  yes  Pain during intercourse:  No  Birth Control:  No  Dysuria:  No  Incontinence: yes with coughing and laughing    Breast lump/bump: No  Breast pain:  No  Nipple discharge:  No  Breast skin changes: No     Menopausal symptoms:  Yes - hot flashes  Concerns for domestic abuse: No      Review of Systems   Constitutional: Negative for appetite change, fatigue, fever and unexpected weight change.   HENT: Negative for congestion, ear pain, hearing loss, sore throat and trouble swallowing.    Eyes: Negative for pain and visual disturbance.   Respiratory: Negative for cough, chest tightness, shortness of breath and wheezing.    Cardiovascular: Negative for chest pain, palpitations and leg swelling.   Gastrointestinal: Positive for constipation. Negative for abdominal pain, blood in stool, diarrhea, nausea and vomiting.   Endocrine: Negative for polyuria.   Genitourinary: Negative for dysuria and hematuria.   Musculoskeletal: Positive for arthralgias. Negative for back pain and myalgias.   Skin: Negative for rash.   Neurological: Negative for dizziness, weakness, numbness and headaches.   Hematological: Does not bruise/bleed easily.   Psychiatric/Behavioral:  "Negative for dysphoric mood, sleep disturbance and suicidal ideas. The patient is not nervous/anxious.        Objective:      Vitals:    08/04/17 1025   BP: 128/76   BP Location: Right arm   Patient Position: Sitting   BP Method: Manual   Pulse: 94   Resp: 18   Temp: 98 °F (36.7 °C)   TempSrc: Oral   SpO2: 97%   Weight: 86.6 kg (190 lb 14.7 oz)   Height: 5' 8" (1.727 m)     Physical Exam  General appearance: No acute distress, cooperative  Neck: FROM, soft, supple, no thyromegaly  Lymph: no anterior or posterior cervical adenopathy, no axillary adenopathy  Heart::  Regular rate and rhythm, no murmur  Lung: Clear to ascultation bilaterally, no wheezing, no rales, no rhonchi, no distress  Breast: symmetric, no masses, no tenderness, no nipple discharge, nipples appear normal without inversion, no skin changes, no axillary adenopathy  Abdomen: Soft, nontender, no distention, no hepatosplenomegaly, bowel sounds normal, no guarding, no rebound, no peritoneal signs  Skin: no rashes, right anterior thigh multiple hyperpigmented lesions  :  Normal external female genitalia without lesions, normal vagina, cervix---thick whitis discharge, no lesions, no CMT, no adnexal masses or tenderness  Extremities: no edema  Peripheral pulses: 2+ pedal pulses bilaterally, good perfusion and color        Assessment:       1. Well female exam with routine gynecological exam    2. Vaginal candida    3. Attention deficit hyperactivity disorder (ADHD), unspecified ADHD type    4. Skin lesion        Plan:       Well female exam with routine gynecological exam  Normal exam today and will send for pap.  She is UTD with her mammogram. She does have a history of abnormal pap in the past and if abnormal will refer to gyn for bx.   -     Liquid-based pap smear, screening  -     HPV High Risk Genotypes, PCR  -     POCT Occult Blood Stool    Vaginal candida  Will treat with fluconazole x 3 days.   -     fluconazole (DIFLUCAN) 150 MG Tab; Take 1 " tablet (150 mg total) by mouth once daily.  Dispense: 3 tablet; Refill: 0    Attention deficit hyperactivity disorder (ADHD), unspecified ADHD type  Doing well on this regimen. No evidence of abuse by  and last fill was 7/13/17 for both rx.  Refill given today but advised this is the last month that I will fill rx. Another referral given for her to establish care with psychiatry.    -     Ambulatory consult to psychyiatry  -     dextroamphetamine-amphetamine (ADDERALL) 20 mg tablet; Take 1 tablet (20 mg total) by mouth once daily.  Dispense: 30 tablet; Refill: 0  -     dextroamphetamine-amphetamine (ADDERALL XR) 30 MG 24 hr capsule; Take 1 capsule (30 mg total) by mouth every morning.  Dispense: 30 capsule; Refill: 0    Skin lesion  History of skin cancer in the past and she needs a full skin check.   -     Ambulatory referral to dermatology    Return if symptoms worsen or fail to improve.

## 2017-08-04 NOTE — TELEPHONE ENCOUNTER
----- Message from Leidy Buckley sent at 8/4/2017 11:11 AM CDT -----  Contact: [phone]  Dr. Rodriguez would like pt to see Dr. Schuster to check spots on her right thigh.  Pt has a history of skin cancer.  No appts available at this time to schedule.  Pls call pt to schedule/advise.

## 2017-08-07 ENCOUNTER — TELEPHONE (OUTPATIENT)
Dept: FAMILY MEDICINE | Facility: CLINIC | Age: 57
End: 2017-08-07

## 2017-08-07 DIAGNOSIS — R92.8 ABNORMAL MAMMOGRAM: Primary | ICD-10-CM

## 2017-08-09 LAB
HPV HR 12 DNA CVX QL NAA+PROBE: NEGATIVE
HPV16 DNA SPEC QL NAA+PROBE: NEGATIVE
HPV18 DNA SPEC QL NAA+PROBE: NEGATIVE

## 2017-08-11 ENCOUNTER — PATIENT MESSAGE (OUTPATIENT)
Dept: FAMILY MEDICINE | Facility: CLINIC | Age: 57
End: 2017-08-11

## 2017-09-06 ENCOUNTER — TELEPHONE (OUTPATIENT)
Dept: FAMILY MEDICINE | Facility: CLINIC | Age: 57
End: 2017-09-06

## 2017-09-06 DIAGNOSIS — F90.9 ATTENTION DEFICIT HYPERACTIVITY DISORDER (ADHD), UNSPECIFIED ADHD TYPE: ICD-10-CM

## 2017-09-06 NOTE — TELEPHONE ENCOUNTER
----- Message from Luis HARDEN Los sent at 9/6/2017  2:50 PM CDT -----  Contact: same  Patient called in and requested a message be sent over regarding getting a Rx ( Adderall ) to hold her over until her appt with Dr. Dallas on 9/13.        [id number] - [place], [place] - [address] [address]  [address] [address]  Phone: [phone] Fax: [phone]    Patient call back number is [phone]

## 2017-09-07 RX ORDER — DEXTROAMPHETAMINE SACCHARATE, AMPHETAMINE ASPARTATE MONOHYDRATE, DEXTROAMPHETAMINE SULFATE AND AMPHETAMINE SULFATE 7.5; 7.5; 7.5; 7.5 MG/1; MG/1; MG/1; MG/1
30 CAPSULE, EXTENDED RELEASE ORAL EVERY MORNING
Qty: 30 CAPSULE | Refills: 0 | Status: SHIPPED | OUTPATIENT
Start: 2017-09-07 | End: 2017-09-24

## 2017-09-07 RX ORDER — DEXTROAMPHETAMINE SACCHARATE, AMPHETAMINE ASPARTATE, DEXTROAMPHETAMINE SULFATE AND AMPHETAMINE SULFATE 5; 5; 5; 5 MG/1; MG/1; MG/1; MG/1
20 TABLET ORAL DAILY
Qty: 30 TABLET | Refills: 0 | Status: SHIPPED | OUTPATIENT
Start: 2017-09-07 | End: 2017-09-24

## 2017-09-24 ENCOUNTER — OFFICE VISIT (OUTPATIENT)
Dept: URGENT CARE | Facility: CLINIC | Age: 57
End: 2017-09-24
Payer: COMMERCIAL

## 2017-09-24 VITALS
RESPIRATION RATE: 16 BRPM | TEMPERATURE: 98 F | DIASTOLIC BLOOD PRESSURE: 90 MMHG | HEIGHT: 68 IN | HEART RATE: 106 BPM | BODY MASS INDEX: 28.79 KG/M2 | SYSTOLIC BLOOD PRESSURE: 145 MMHG | OXYGEN SATURATION: 97 % | WEIGHT: 190 LBS

## 2017-09-24 DIAGNOSIS — K04.7 DENTAL INFECTION: Primary | ICD-10-CM

## 2017-09-24 DIAGNOSIS — R03.0 ELEVATED BLOOD PRESSURE READING: ICD-10-CM

## 2017-09-24 PROCEDURE — 3008F BODY MASS INDEX DOCD: CPT | Mod: S$GLB,,, | Performed by: FAMILY MEDICINE

## 2017-09-24 PROCEDURE — 99213 OFFICE O/P EST LOW 20 MIN: CPT | Mod: S$GLB,,, | Performed by: FAMILY MEDICINE

## 2017-09-24 PROCEDURE — 3080F DIAST BP >= 90 MM HG: CPT | Mod: S$GLB,,, | Performed by: FAMILY MEDICINE

## 2017-09-24 PROCEDURE — 3077F SYST BP >= 140 MM HG: CPT | Mod: S$GLB,,, | Performed by: FAMILY MEDICINE

## 2017-09-24 RX ORDER — LISDEXAMFETAMINE DIMESYLATE 70 MG/1
CAPSULE ORAL
Status: ON HOLD | COMMUNITY
Start: 2017-09-15 | End: 2021-04-25 | Stop reason: HOSPADM

## 2017-09-24 RX ORDER — IBUPROFEN 800 MG/1
800 TABLET ORAL 3 TIMES DAILY PRN
Qty: 21 TABLET | Refills: 0 | Status: SHIPPED | OUTPATIENT
Start: 2017-09-24 | End: 2017-10-01

## 2017-09-24 RX ORDER — AMOXICILLIN 875 MG/1
875 TABLET, FILM COATED ORAL 2 TIMES DAILY
Qty: 20 TABLET | Refills: 0 | Status: SHIPPED | OUTPATIENT
Start: 2017-09-24 | End: 2017-10-04

## 2017-09-24 NOTE — PROGRESS NOTES
"Subjective:       Patient ID: Tanya Espinoza is a 57 y.o. female.    Vitals:  height is 5' 8" (1.727 m) and weight is 86.2 kg (190 lb). Her temperature is 97.9 °F (36.6 °C). Her blood pressure is 145/90 (abnormal) and her pulse is 106. Her respiration is 16 and oxygen saturation is 97%.     Chief Complaint: Dental Pain    A tooth on bottom right side of mouth causing pain and swollen glands on right side.       Dental Pain    This is a new problem. The current episode started in the past 7 days. The problem occurs constantly. The problem has been unchanged. The pain is at a severity of 8/10. Pertinent negatives include no fever.     Review of Systems   Constitution: Negative for chills and fever.   HENT: Negative for sore throat.    Eyes: Negative for blurred vision.   Cardiovascular: Negative for chest pain.   Respiratory: Negative for shortness of breath.    Skin: Negative for rash.   Musculoskeletal: Negative for back pain and joint pain.   Gastrointestinal: Negative for abdominal pain, diarrhea, nausea and vomiting.   Neurological: Negative for headaches.   Psychiatric/Behavioral: The patient is not nervous/anxious.        Objective:      Physical Exam   Constitutional: She appears well-developed and well-nourished.   HENT:   Head: Normocephalic and atraumatic.   Right Ear: External ear normal.   Left Ear: External ear normal.   Nose: Nose normal.   Peridental inflammation/induration around tooth 33.   Lymphadenopathy:     She has no cervical adenopathy.   Nursing note and vitals reviewed.      Assessment:       1. Dental infection        Plan:         Dental infection  -     amoxicillin (AMOXIL) 875 MG tablet; Take 1 tablet (875 mg total) by mouth 2 (two) times daily.  Dispense: 20 tablet; Refill: 0  -     ibuprofen (ADVIL,MOTRIN) 800 MG tablet; Take 1 tablet (800 mg total) by mouth 3 (three) times daily as needed for Pain.  Dispense: 21 tablet; Refill: 0    Elevated Blood Pressure          -     F/U with " PCP for BP re-check

## 2017-09-27 ENCOUNTER — TELEPHONE (OUTPATIENT)
Dept: OCCUPATIONAL MEDICINE | Facility: CLINIC | Age: 57
End: 2017-09-27

## 2017-10-02 ENCOUNTER — TELEPHONE (OUTPATIENT)
Dept: URGENT CARE | Facility: CLINIC | Age: 57
End: 2017-10-02

## 2017-10-02 DIAGNOSIS — K04.7 DENTAL INFECTION: ICD-10-CM

## 2017-10-02 RX ORDER — CLINDAMYCIN HYDROCHLORIDE 300 MG/1
300 CAPSULE ORAL 3 TIMES DAILY
Qty: 30 CAPSULE | Refills: 0 | Status: SHIPPED | OUTPATIENT
Start: 2017-10-02 | End: 2017-10-12

## 2017-10-02 NOTE — TELEPHONE ENCOUNTER
Patient calling back, requesting a refill of antibiotics. Has an appointment with Dentist on 10/6/17.

## 2019-03-07 ENCOUNTER — OFFICE VISIT (OUTPATIENT)
Dept: URGENT CARE | Facility: CLINIC | Age: 59
End: 2019-03-07
Payer: COMMERCIAL

## 2019-03-07 VITALS
SYSTOLIC BLOOD PRESSURE: 134 MMHG | WEIGHT: 190 LBS | DIASTOLIC BLOOD PRESSURE: 79 MMHG | TEMPERATURE: 98 F | RESPIRATION RATE: 18 BRPM | BODY MASS INDEX: 28.79 KG/M2 | HEIGHT: 68 IN | HEART RATE: 75 BPM | OXYGEN SATURATION: 98 %

## 2019-03-07 DIAGNOSIS — L08.9 INFECTED SEBACEOUS CYST: Primary | ICD-10-CM

## 2019-03-07 DIAGNOSIS — L72.3 INFECTED SEBACEOUS CYST: Primary | ICD-10-CM

## 2019-03-07 PROCEDURE — 99214 PR OFFICE/OUTPT VISIT, EST, LEVL IV, 30-39 MIN: ICD-10-PCS | Mod: S$GLB,,, | Performed by: FAMILY MEDICINE

## 2019-03-07 PROCEDURE — 3008F PR BODY MASS INDEX (BMI) DOCUMENTED: ICD-10-PCS | Mod: CPTII,S$GLB,, | Performed by: FAMILY MEDICINE

## 2019-03-07 PROCEDURE — 3075F SYST BP GE 130 - 139MM HG: CPT | Mod: CPTII,S$GLB,, | Performed by: FAMILY MEDICINE

## 2019-03-07 PROCEDURE — 3008F BODY MASS INDEX DOCD: CPT | Mod: CPTII,S$GLB,, | Performed by: FAMILY MEDICINE

## 2019-03-07 PROCEDURE — 3078F PR MOST RECENT DIASTOLIC BLOOD PRESSURE < 80 MM HG: ICD-10-PCS | Mod: CPTII,S$GLB,, | Performed by: FAMILY MEDICINE

## 2019-03-07 PROCEDURE — 3075F PR MOST RECENT SYSTOLIC BLOOD PRESS GE 130-139MM HG: ICD-10-PCS | Mod: CPTII,S$GLB,, | Performed by: FAMILY MEDICINE

## 2019-03-07 PROCEDURE — 3078F DIAST BP <80 MM HG: CPT | Mod: CPTII,S$GLB,, | Performed by: FAMILY MEDICINE

## 2019-03-07 PROCEDURE — 99214 OFFICE O/P EST MOD 30 MIN: CPT | Mod: S$GLB,,, | Performed by: FAMILY MEDICINE

## 2019-03-07 RX ORDER — HYDROCODONE BITARTRATE AND ACETAMINOPHEN 5; 325 MG/1; MG/1
1 TABLET ORAL EVERY 6 HOURS PRN
Qty: 5 TABLET | Refills: 0 | Status: ON HOLD | OUTPATIENT
Start: 2019-03-07 | End: 2021-04-25 | Stop reason: HOSPADM

## 2019-03-07 RX ORDER — SULFAMETHOXAZOLE AND TRIMETHOPRIM 800; 160 MG/1; MG/1
1 TABLET ORAL 2 TIMES DAILY
Qty: 20 TABLET | Refills: 0 | Status: ON HOLD | OUTPATIENT
Start: 2019-03-07 | End: 2021-04-25 | Stop reason: HOSPADM

## 2019-03-07 NOTE — PROGRESS NOTES
"Subjective:       Patient ID: Tanya Espinoza is a 59 y.o. female.    Vitals:  height is 5' 8" (1.727 m) and weight is 86.2 kg (190 lb). Her oral temperature is 97.8 °F (36.6 °C). Her blood pressure is 134/79 and her pulse is 75. Her respiration is 18 and oxygen saturation is 98%.     Chief Complaint: Shoulder Pain    New problem pt states has upper left shoulder pain, has a bump and very hard      Shoulder Pain    The pain is present in the left shoulder. This is a new problem. The current episode started more than 1 month ago. The problem occurs constantly. The problem has been gradually worsening. The quality of the pain is described as aching. Pertinent negatives include no fever or headaches. She has tried acetaminophen for the symptoms. The treatment provided no relief.       Constitution: Negative for chills, fatigue and fever.   HENT: Negative for congestion and sore throat.    Neck: Negative for painful lymph nodes.   Cardiovascular: Negative for chest pain and leg swelling.   Eyes: Negative for double vision and blurred vision.   Respiratory: Negative for cough and shortness of breath.    Gastrointestinal: Negative for nausea, vomiting and diarrhea.   Genitourinary: Negative for dysuria, frequency, urgency and history of kidney stones.   Musculoskeletal: Negative for joint pain, joint swelling, muscle cramps and muscle ache.   Skin: Negative for color change, pale, rash, erythema and bruising.   Allergic/Immunologic: Negative for seasonal allergies.   Neurological: Negative for dizziness, history of vertigo, light-headedness, passing out and headaches.   Hematologic/Lymphatic: Negative for swollen lymph nodes.   Psychiatric/Behavioral: Negative for nervous/anxious, sleep disturbance and depression. The patient is not nervous/anxious.        Objective:      Physical Exam   Constitutional: She is oriented to person, place, and time. She appears well-developed and well-nourished.   HENT:   Head: " Normocephalic and atraumatic. Head is without abrasion, without contusion and without laceration.   Right Ear: External ear normal.   Left Ear: External ear normal.   Nose: Nose normal.   Mouth/Throat: Oropharynx is clear and moist.   Eyes: Conjunctivae, EOM and lids are normal.   Neck: Trachea normal, full passive range of motion without pain and phonation normal. Neck supple.   Cardiovascular: Normal rate.   Pulmonary/Chest: Effort normal. No stridor. No respiratory distress.   Musculoskeletal: Normal range of motion.   Neurological: She is alert and oriented to person, place, and time.   Skin: Skin is warm, dry and intact. Capillary refill takes less than 2 seconds. No abrasion, no bruising, no burn, no ecchymosis, no laceration, no lesion and no rash noted. No erythema.        Psychiatric: She has a normal mood and affect. Her speech is normal and behavior is normal. Judgment and thought content normal. Cognition and memory are normal.   Nursing note and vitals reviewed.      Assessment:       1. Infected sebaceous cyst        Plan:         Infected sebaceous cyst    Other orders  -     sulfamethoxazole-trimethoprim 800-160mg (BACTRIM DS) 800-160 mg Tab; Take 1 tablet by mouth 2 (two) times daily.  Dispense: 20 tablet; Refill: 0  -     HYDROcodone-acetaminophen (NORCO) 5-325 mg per tablet; Take 1 tablet by mouth every 6 (six) hours as needed for Pain.  Dispense: 5 tablet; Refill: 0    d/w regarding I&d vs sending to derm/surg for complete removal. Pt would prefer no packing and opted for removal.

## 2019-03-08 ENCOUNTER — TELEPHONE (OUTPATIENT)
Dept: FAMILY MEDICINE | Facility: CLINIC | Age: 59
End: 2019-03-08

## 2019-03-08 NOTE — TELEPHONE ENCOUNTER
Spoke to patient. She will use warm compresses. Appointment with the surgeon is on the 21st. The urgent care wanted to treat it with abx 1st and not drain it.

## 2019-03-08 NOTE — TELEPHONE ENCOUNTER
----- Message from Raymond Vital sent at 3/7/2019 12:05 PM CST -----  Contact: Patient  Advised needs to be seen today, she has a golf ball sized lump on her back left shoulder.  Very painful and hot to touch purple and red in color and not feeling well.  Please call [phone]

## 2019-03-08 NOTE — TELEPHONE ENCOUNTER
Spoke to pt. Went to Urgent care yesterday and they gave her abx and scheduled her with a surgeon. Gave her 5 pain pills. She says she is uncomfortable from the pressure and asks for any recommendations to help with this. Please advise.

## 2019-03-13 ENCOUNTER — TELEPHONE (OUTPATIENT)
Dept: SURGERY | Facility: CLINIC | Age: 59
End: 2019-03-13

## 2019-03-13 NOTE — TELEPHONE ENCOUNTER
----- Message from Taylor López sent at 3/13/2019 11:04 AM CDT -----  Contact: Tanya pt  Type:  Sooner Apoointment Request    Caller is requesting a sooner appointment.  Caller declined first available appointment listed below.  Caller will not accept being placed on the waitlist and is requesting a message be sent to doctor.    Name of Caller:  Tanya  When is the first available appointment?  Scheduled for March 21st  Symptoms:  Fever, chills, sweats, cysts are getting worse, has a rash now  Best Call Back Number:  [phone]  Additional Information:  Tanya odonnell she has an appt for the 21st to remove the cysts. She is requesting to be seen earlier due to infection making her sick now. She has been taking antibiotics and its not helping  Pls call pt to adv

## 2019-03-13 NOTE — TELEPHONE ENCOUNTER
Per Dr. Miranda patient needs to go to the emergency room and ASHLEY Werner will notify her. Reinaldo

## 2019-03-13 NOTE — TELEPHONE ENCOUNTER
I called patient and she states that the cyst and she's running a fever with a rash is on her back.  I informed her that I need to discuss with Dr. Miranda if he can see her tomorrow or if she should go to the emergency.  Reinaldo

## 2019-03-13 NOTE — TELEPHONE ENCOUNTER
Spoke to pt, informed that Dr Miranda states best to go to ER, may need IV abx. possible surgery. CC'd Dr Miranda.

## 2019-03-15 ENCOUNTER — CLINICAL SUPPORT (OUTPATIENT)
Dept: FAMILY MEDICINE | Facility: CLINIC | Age: 59
End: 2019-03-15
Payer: COMMERCIAL

## 2019-03-15 ENCOUNTER — TELEPHONE (OUTPATIENT)
Dept: FAMILY MEDICINE | Facility: CLINIC | Age: 59
End: 2019-03-15

## 2019-03-15 DIAGNOSIS — Z12.31 BREAST CANCER SCREENING BY MAMMOGRAM: Primary | ICD-10-CM

## 2019-03-15 DIAGNOSIS — Z00.00 LABORATORY EXAM ORDERED AS PART OF ROUTINE GENERAL MEDICAL EXAMINATION: ICD-10-CM

## 2019-03-15 NOTE — PROGRESS NOTES
Pt had cyst drain left upper back in the ER 3/13/19. Removed packing,light brown drainage on old dressing and packing (about 3 inches long). wound edges are clean, no s/s of infection. Site viewed by Bety RAYMUNDO. Pt is to rinse under shower using dial soap, keep covered. Report any fever or pain to this office. Scheduled for labs and an annual appointment while she was here. I could not talk her into a mammogram.

## 2019-03-15 NOTE — TELEPHONE ENCOUNTER
----- Message from Rebel Patel sent at 3/15/2019 10:28 AM CDT -----  Contact: self   Type:  Same Day Appointment Request    Caller is requesting a same day appointment.  Caller declined first available appointment listed below.      Name of Caller: patient   When is the first available appointment? Monday   Symptoms:  Patient need the packing taken out of her back from cyst on back.   Best Call Back Number: [phone] (home)

## 2019-03-15 NOTE — TELEPHONE ENCOUNTER
Spoke with Bety. Pt to come in for nurse visit to remove packing and she will look at it. Appointment schedueled with patient.

## 2019-03-22 ENCOUNTER — PATIENT OUTREACH (OUTPATIENT)
Dept: ADMINISTRATIVE | Facility: HOSPITAL | Age: 59
End: 2019-03-22

## 2019-03-22 NOTE — LETTER
March 22, 2019    Tanya Espinoza  [address] [id number]             [facility]  [address]  [place] [address]  Phone: [phone] Dear Ms. Espinoza:    [facility] is committed to your overall health.  To help you get the most out of each of your visits, we will review your information to make sure you are up to date on all of your recommended tests and/or procedures.      Wendy Rodriguez DO    has found that your chart shows you may be due for the following:    Colon cancer screening  Influenza Vaccine  Mammogram    If you have had any of the above done at another facility, please bring the records with you or Fax them to [phone] so that your record at [facility] will be complete. If you have not had any of these tests or procedures done recently and would like to complete this testing ,  please call [phone] or send a message through your [facility] portal to your provider's office.     If you have an upcoming scheduled appointment for the above test and/or procedures, please disregard this letter.    If you are currently taking medication, please bring it with you to your appointment for review.    Sincerely,    DO Josephine Shrestha  Clinical Care Coordinator  [facility]

## 2019-03-22 NOTE — PROGRESS NOTES
Health Maintenance Due   Topic Date Due    Colonoscopy  02/12/2012    LDCT Lung Screen  02/12/2017    Influenza Vaccine  08/01/2018    Mammogram  08/04/2018     Pre-visit outreach via mail

## 2019-04-26 ENCOUNTER — TELEPHONE (OUTPATIENT)
Dept: FAMILY MEDICINE | Facility: CLINIC | Age: 59
End: 2019-04-26

## 2019-07-05 ENCOUNTER — TELEPHONE (OUTPATIENT)
Dept: FAMILY MEDICINE | Facility: CLINIC | Age: 59
End: 2019-07-05

## 2020-04-22 ENCOUNTER — TELEPHONE (OUTPATIENT)
Dept: FAMILY MEDICINE | Facility: CLINIC | Age: 60
End: 2020-04-22

## 2020-04-22 NOTE — TELEPHONE ENCOUNTER
Spoke to pt, sent her a link to sign up for [facility]. Will check back after it is done and set her up for a virtual visit/

## 2021-04-24 PROBLEM — I44.7 NEW ONSET LEFT BUNDLE BRANCH BLOCK (LBBB): Status: ACTIVE | Noted: 2021-04-24

## 2021-04-24 PROBLEM — Z86.79 HISTORY OF LEFT BUNDLE BRANCH BLOCK (LBBB): Status: ACTIVE | Noted: 2021-04-24

## 2021-04-24 PROBLEM — E78.5 HYPERLIPEMIA: Status: ACTIVE | Noted: 2021-04-24

## 2021-04-24 PROBLEM — K21.9 GERD (GASTROESOPHAGEAL REFLUX DISEASE): Status: ACTIVE | Noted: 2021-04-24

## 2021-04-24 PROBLEM — R07.9 CHEST PAIN: Status: ACTIVE | Noted: 2021-04-24

## 2021-04-25 PROBLEM — R74.01 TRANSAMINITIS: Status: ACTIVE | Noted: 2021-04-25

## 2021-04-26 ENCOUNTER — TELEPHONE (OUTPATIENT)
Dept: CARDIOLOGY | Facility: CLINIC | Age: 61
End: 2021-04-26

## 2021-04-27 ENCOUNTER — TELEPHONE (OUTPATIENT)
Dept: CARDIOLOGY | Facility: CLINIC | Age: 61
End: 2021-04-27

## 2021-04-28 ENCOUNTER — PATIENT MESSAGE (OUTPATIENT)
Dept: FAMILY MEDICINE | Facility: CLINIC | Age: 61
End: 2021-04-28

## 2021-05-03 ENCOUNTER — OFFICE VISIT (OUTPATIENT)
Dept: CARDIOLOGY | Facility: CLINIC | Age: 61
End: 2021-05-03
Payer: COMMERCIAL

## 2021-05-03 ENCOUNTER — LAB VISIT (OUTPATIENT)
Dept: FAMILY MEDICINE | Facility: CLINIC | Age: 61
End: 2021-05-03
Payer: COMMERCIAL

## 2021-05-03 VITALS
HEART RATE: 71 BPM | HEIGHT: 68 IN | WEIGHT: 199.94 LBS | DIASTOLIC BLOOD PRESSURE: 72 MMHG | BODY MASS INDEX: 30.3 KG/M2 | SYSTOLIC BLOOD PRESSURE: 125 MMHG

## 2021-05-03 DIAGNOSIS — Z79.01 LONG TERM (CURRENT) USE OF ANTICOAGULANTS: ICD-10-CM

## 2021-05-03 DIAGNOSIS — I44.7 LBBB (LEFT BUNDLE BRANCH BLOCK): Chronic | ICD-10-CM

## 2021-05-03 DIAGNOSIS — I34.0 NONRHEUMATIC MITRAL VALVE REGURGITATION: ICD-10-CM

## 2021-05-03 DIAGNOSIS — Z03.818 ENCNTR FOR OBS FOR SUSP EXPSR TO OTH BIOLG AGENTS RULED OUT: ICD-10-CM

## 2021-05-03 DIAGNOSIS — R06.02 SOB (SHORTNESS OF BREATH): Primary | ICD-10-CM

## 2021-05-03 DIAGNOSIS — I48.0 PAROXYSMAL A-FIB: ICD-10-CM

## 2021-05-03 DIAGNOSIS — Z72.0 TOBACCO ABUSE: ICD-10-CM

## 2021-05-03 DIAGNOSIS — R07.89 CHEST HEAVINESS: Chronic | ICD-10-CM

## 2021-05-03 DIAGNOSIS — R94.39 ABNORMAL NUCLEAR STRESS TEST: ICD-10-CM

## 2021-05-03 DIAGNOSIS — E66.9 OBESITY, CLASS I, BMI 30-34.9: ICD-10-CM

## 2021-05-03 DIAGNOSIS — J98.11 ATELECTASIS: ICD-10-CM

## 2021-05-03 DIAGNOSIS — Z03.818 ENCNTR FOR OBS FOR SUSP EXPSR TO OTH BIOLG AGENTS RULED OUT: Primary | ICD-10-CM

## 2021-05-03 PROBLEM — E66.811 OBESITY, CLASS I, BMI 30-34.9: Status: ACTIVE | Noted: 2021-05-03

## 2021-05-03 PROCEDURE — 3008F PR BODY MASS INDEX (BMI) DOCUMENTED: ICD-10-PCS | Mod: CPTII,S$GLB,, | Performed by: INTERNAL MEDICINE

## 2021-05-03 PROCEDURE — 99999 PR PBB SHADOW E&M-EST. PATIENT-LVL III: ICD-10-PCS | Mod: PBBFAC,,, | Performed by: INTERNAL MEDICINE

## 2021-05-03 PROCEDURE — U0005 INFEC AGEN DETEC AMPLI PROBE: HCPCS | Performed by: INTERNAL MEDICINE

## 2021-05-03 PROCEDURE — 99214 PR OFFICE/OUTPT VISIT, EST, LEVL IV, 30-39 MIN: ICD-10-PCS | Mod: S$GLB,,, | Performed by: INTERNAL MEDICINE

## 2021-05-03 PROCEDURE — 3008F BODY MASS INDEX DOCD: CPT | Mod: CPTII,S$GLB,, | Performed by: INTERNAL MEDICINE

## 2021-05-03 PROCEDURE — U0003 INFECTIOUS AGENT DETECTION BY NUCLEIC ACID (DNA OR RNA); SEVERE ACUTE RESPIRATORY SYNDROME CORONAVIRUS 2 (SARS-COV-2) (CORONAVIRUS DISEASE [COVID-19]), AMPLIFIED PROBE TECHNIQUE, MAKING USE OF HIGH THROUGHPUT TECHNOLOGIES AS DESCRIBED BY CMS-2020-01-R: HCPCS | Performed by: INTERNAL MEDICINE

## 2021-05-03 PROCEDURE — 1126F PR PAIN SEVERITY QUANTIFIED, NO PAIN PRESENT: ICD-10-PCS | Mod: S$GLB,,, | Performed by: INTERNAL MEDICINE

## 2021-05-03 PROCEDURE — 99999 PR PBB SHADOW E&M-EST. PATIENT-LVL III: CPT | Mod: PBBFAC,,, | Performed by: INTERNAL MEDICINE

## 2021-05-03 PROCEDURE — 1126F AMNT PAIN NOTED NONE PRSNT: CPT | Mod: S$GLB,,, | Performed by: INTERNAL MEDICINE

## 2021-05-03 PROCEDURE — 99214 OFFICE O/P EST MOD 30 MIN: CPT | Mod: S$GLB,,, | Performed by: INTERNAL MEDICINE

## 2021-05-03 RX ORDER — SODIUM CHLORIDE 9 MG/ML
INJECTION, SOLUTION INTRAVENOUS ONCE
Status: CANCELLED | OUTPATIENT
Start: 2021-05-03 | End: 2021-05-03

## 2021-05-03 RX ORDER — NAPROXEN SODIUM 220 MG/1
81 TABLET, FILM COATED ORAL ONCE
Status: CANCELLED | OUTPATIENT
Start: 2021-05-03 | End: 2021-05-03

## 2021-05-03 RX ORDER — CLOPIDOGREL BISULFATE 75 MG/1
75 TABLET ORAL ONCE
Status: CANCELLED | OUTPATIENT
Start: 2021-05-03 | End: 2021-05-03

## 2021-05-04 LAB — SARS-COV-2 RNA RESP QL NAA+PROBE: NOT DETECTED

## 2021-05-10 ENCOUNTER — TELEPHONE (OUTPATIENT)
Dept: GASTROENTEROLOGY | Facility: CLINIC | Age: 61
End: 2021-05-10

## 2021-05-10 ENCOUNTER — OFFICE VISIT (OUTPATIENT)
Dept: FAMILY MEDICINE | Facility: CLINIC | Age: 61
End: 2021-05-10
Payer: COMMERCIAL

## 2021-05-10 VITALS
HEIGHT: 68 IN | TEMPERATURE: 97 F | OXYGEN SATURATION: 99 % | BODY MASS INDEX: 30.64 KG/M2 | DIASTOLIC BLOOD PRESSURE: 76 MMHG | WEIGHT: 202.19 LBS | RESPIRATION RATE: 18 BRPM | SYSTOLIC BLOOD PRESSURE: 122 MMHG | HEART RATE: 83 BPM

## 2021-05-10 DIAGNOSIS — Z12.31 ENCOUNTER FOR SCREENING MAMMOGRAM FOR MALIGNANT NEOPLASM OF BREAST: ICD-10-CM

## 2021-05-10 DIAGNOSIS — Z11.4 SCREENING FOR HIV (HUMAN IMMUNODEFICIENCY VIRUS): ICD-10-CM

## 2021-05-10 DIAGNOSIS — Z72.0 TOBACCO ABUSE: ICD-10-CM

## 2021-05-10 DIAGNOSIS — R10.10 UPPER ABDOMINAL PAIN: ICD-10-CM

## 2021-05-10 DIAGNOSIS — R73.09 ELEVATED GLUCOSE: ICD-10-CM

## 2021-05-10 DIAGNOSIS — I48.0 PAROXYSMAL ATRIAL FIBRILLATION: ICD-10-CM

## 2021-05-10 DIAGNOSIS — K59.00 CONSTIPATION, UNSPECIFIED CONSTIPATION TYPE: ICD-10-CM

## 2021-05-10 DIAGNOSIS — D64.9 ANEMIA, UNSPECIFIED TYPE: ICD-10-CM

## 2021-05-10 DIAGNOSIS — R06.02 SHORTNESS OF BREATH: ICD-10-CM

## 2021-05-10 DIAGNOSIS — Z12.11 SCREEN FOR COLON CANCER: ICD-10-CM

## 2021-05-10 DIAGNOSIS — I25.10 CORONARY ARTERY DISEASE INVOLVING NATIVE CORONARY ARTERY OF NATIVE HEART WITHOUT ANGINA PECTORIS: ICD-10-CM

## 2021-05-10 DIAGNOSIS — L02.411 ABSCESS OF AXILLA, RIGHT: ICD-10-CM

## 2021-05-10 DIAGNOSIS — Z00.00 WELL ADULT EXAM: Primary | ICD-10-CM

## 2021-05-10 DIAGNOSIS — R82.998 LEUKOCYTES IN URINE: ICD-10-CM

## 2021-05-10 DIAGNOSIS — E78.5 HYPERLIPIDEMIA, UNSPECIFIED HYPERLIPIDEMIA TYPE: ICD-10-CM

## 2021-05-10 DIAGNOSIS — R79.89 ELEVATED LFTS: ICD-10-CM

## 2021-05-10 PROCEDURE — 99386 PREV VISIT NEW AGE 40-64: CPT | Mod: S$GLB,,, | Performed by: INTERNAL MEDICINE

## 2021-05-10 PROCEDURE — 3008F BODY MASS INDEX DOCD: CPT | Mod: CPTII,S$GLB,, | Performed by: INTERNAL MEDICINE

## 2021-05-10 PROCEDURE — 87086 URINE CULTURE/COLONY COUNT: CPT | Performed by: INTERNAL MEDICINE

## 2021-05-10 PROCEDURE — 1125F AMNT PAIN NOTED PAIN PRSNT: CPT | Mod: S$GLB,,, | Performed by: INTERNAL MEDICINE

## 2021-05-10 PROCEDURE — 1125F PR PAIN SEVERITY QUANTIFIED, PAIN PRESENT: ICD-10-PCS | Mod: S$GLB,,, | Performed by: INTERNAL MEDICINE

## 2021-05-10 PROCEDURE — 99386 PR PREVENTIVE VISIT,NEW,40-64: ICD-10-PCS | Mod: S$GLB,,, | Performed by: INTERNAL MEDICINE

## 2021-05-10 PROCEDURE — 3008F PR BODY MASS INDEX (BMI) DOCUMENTED: ICD-10-PCS | Mod: CPTII,S$GLB,, | Performed by: INTERNAL MEDICINE

## 2021-05-10 RX ORDER — SULFAMETHOXAZOLE AND TRIMETHOPRIM 800; 160 MG/1; MG/1
1 TABLET ORAL 2 TIMES DAILY
Qty: 20 TABLET | Refills: 0 | Status: SHIPPED | OUTPATIENT
Start: 2021-05-10 | End: 2022-01-03

## 2021-05-10 RX ORDER — PANTOPRAZOLE SODIUM 40 MG/1
40 TABLET, DELAYED RELEASE ORAL DAILY
Qty: 90 TABLET | Refills: 2 | Status: SHIPPED | OUTPATIENT
Start: 2021-05-10 | End: 2021-05-13 | Stop reason: ALTCHOICE

## 2021-05-11 LAB
BACTERIA UR CULT: NORMAL
BACTERIA UR CULT: NORMAL

## 2021-05-13 ENCOUNTER — OFFICE VISIT (OUTPATIENT)
Dept: FAMILY MEDICINE | Facility: CLINIC | Age: 61
End: 2021-05-13
Payer: COMMERCIAL

## 2021-05-13 ENCOUNTER — TELEPHONE (OUTPATIENT)
Dept: FAMILY MEDICINE | Facility: CLINIC | Age: 61
End: 2021-05-13

## 2021-05-13 VITALS
SYSTOLIC BLOOD PRESSURE: 122 MMHG | RESPIRATION RATE: 14 BRPM | HEIGHT: 68 IN | TEMPERATURE: 98 F | WEIGHT: 198.5 LBS | HEART RATE: 86 BPM | BODY MASS INDEX: 30.08 KG/M2 | DIASTOLIC BLOOD PRESSURE: 68 MMHG | OXYGEN SATURATION: 99 %

## 2021-05-13 DIAGNOSIS — J81.0 ACUTE PULMONARY EDEMA: Primary | ICD-10-CM

## 2021-05-13 DIAGNOSIS — R06.02 SHORTNESS OF BREATH: ICD-10-CM

## 2021-05-13 DIAGNOSIS — E78.5 HYPERLIPIDEMIA, UNSPECIFIED HYPERLIPIDEMIA TYPE: ICD-10-CM

## 2021-05-13 DIAGNOSIS — D64.9 ANEMIA, UNSPECIFIED TYPE: ICD-10-CM

## 2021-05-13 DIAGNOSIS — R16.0 HEPATOMEGALY: ICD-10-CM

## 2021-05-13 DIAGNOSIS — R10.13 EPIGASTRIC PAIN: ICD-10-CM

## 2021-05-13 DIAGNOSIS — R79.89 ELEVATED LFTS: ICD-10-CM

## 2021-05-13 DIAGNOSIS — R73.03 PRE-DIABETES: ICD-10-CM

## 2021-05-13 PROCEDURE — 3008F BODY MASS INDEX DOCD: CPT | Mod: CPTII,S$GLB,, | Performed by: INTERNAL MEDICINE

## 2021-05-13 PROCEDURE — 3008F PR BODY MASS INDEX (BMI) DOCUMENTED: ICD-10-PCS | Mod: CPTII,S$GLB,, | Performed by: INTERNAL MEDICINE

## 2021-05-13 PROCEDURE — 1126F PR PAIN SEVERITY QUANTIFIED, NO PAIN PRESENT: ICD-10-PCS | Mod: S$GLB,,, | Performed by: INTERNAL MEDICINE

## 2021-05-13 PROCEDURE — 1126F AMNT PAIN NOTED NONE PRSNT: CPT | Mod: S$GLB,,, | Performed by: INTERNAL MEDICINE

## 2021-05-13 PROCEDURE — 99214 PR OFFICE/OUTPT VISIT, EST, LEVL IV, 30-39 MIN: ICD-10-PCS | Mod: S$GLB,,, | Performed by: INTERNAL MEDICINE

## 2021-05-13 PROCEDURE — 99214 OFFICE O/P EST MOD 30 MIN: CPT | Mod: S$GLB,,, | Performed by: INTERNAL MEDICINE

## 2021-05-13 RX ORDER — ROSUVASTATIN CALCIUM 40 MG/1
40 TABLET, COATED ORAL NIGHTLY
Qty: 90 TABLET | Refills: 3 | Status: SHIPPED | OUTPATIENT
Start: 2021-05-13 | End: 2022-03-30

## 2021-05-13 RX ORDER — FUROSEMIDE 20 MG/1
20 TABLET ORAL DAILY PRN
Qty: 10 TABLET | Refills: 0 | Status: SHIPPED | OUTPATIENT
Start: 2021-05-13 | End: 2022-01-03

## 2021-05-13 RX ORDER — PANTOPRAZOLE SODIUM 40 MG/1
40 TABLET, DELAYED RELEASE ORAL DAILY
Qty: 90 TABLET | Refills: 2 | Status: ON HOLD | OUTPATIENT
Start: 2021-05-13 | End: 2024-03-22 | Stop reason: HOSPADM

## 2021-05-14 PROBLEM — I10 ESSENTIAL HYPERTENSION: Status: ACTIVE | Noted: 2021-05-14

## 2021-05-14 PROBLEM — I63.9 CVA (CEREBRAL VASCULAR ACCIDENT): Status: ACTIVE | Noted: 2021-05-14

## 2021-05-14 PROBLEM — L02.411 ABSCESS OF RIGHT AXILLA: Status: ACTIVE | Noted: 2021-05-14

## 2021-05-15 PROBLEM — R20.2 FACIAL PARESTHESIA: Status: ACTIVE | Noted: 2021-05-14

## 2021-05-15 PROBLEM — I67.1 NONRUPTURED CEREBRAL ANEURYSM: Status: ACTIVE | Noted: 2021-05-15

## 2021-05-19 ENCOUNTER — TELEPHONE (OUTPATIENT)
Dept: GASTROENTEROLOGY | Facility: CLINIC | Age: 61
End: 2021-05-19

## 2021-05-20 ENCOUNTER — PATIENT MESSAGE (OUTPATIENT)
Dept: FAMILY MEDICINE | Facility: CLINIC | Age: 61
End: 2021-05-20

## 2021-05-20 ENCOUNTER — OFFICE VISIT (OUTPATIENT)
Dept: FAMILY MEDICINE | Facility: CLINIC | Age: 61
End: 2021-05-20
Payer: COMMERCIAL

## 2021-05-20 VITALS
TEMPERATURE: 98 F | WEIGHT: 203.25 LBS | BODY MASS INDEX: 30.8 KG/M2 | OXYGEN SATURATION: 98 % | HEIGHT: 68 IN | SYSTOLIC BLOOD PRESSURE: 118 MMHG | HEART RATE: 74 BPM | DIASTOLIC BLOOD PRESSURE: 78 MMHG | RESPIRATION RATE: 14 BRPM

## 2021-05-20 DIAGNOSIS — R10.32 LEFT LOWER QUADRANT ABDOMINAL PAIN: Primary | ICD-10-CM

## 2021-05-20 DIAGNOSIS — I72.5 BASILAR ARTERY ANEURYSM: ICD-10-CM

## 2021-05-20 PROCEDURE — 1125F PR PAIN SEVERITY QUANTIFIED, PAIN PRESENT: ICD-10-PCS | Mod: S$GLB,,, | Performed by: INTERNAL MEDICINE

## 2021-05-20 PROCEDURE — 99213 OFFICE O/P EST LOW 20 MIN: CPT | Mod: S$GLB,,, | Performed by: INTERNAL MEDICINE

## 2021-05-20 PROCEDURE — 3008F BODY MASS INDEX DOCD: CPT | Mod: CPTII,S$GLB,, | Performed by: INTERNAL MEDICINE

## 2021-05-20 PROCEDURE — 3008F PR BODY MASS INDEX (BMI) DOCUMENTED: ICD-10-PCS | Mod: CPTII,S$GLB,, | Performed by: INTERNAL MEDICINE

## 2021-05-20 PROCEDURE — 1125F AMNT PAIN NOTED PAIN PRSNT: CPT | Mod: S$GLB,,, | Performed by: INTERNAL MEDICINE

## 2021-05-20 PROCEDURE — 99213 PR OFFICE/OUTPT VISIT, EST, LEVL III, 20-29 MIN: ICD-10-PCS | Mod: S$GLB,,, | Performed by: INTERNAL MEDICINE

## 2021-05-24 ENCOUNTER — CLINICAL SUPPORT (OUTPATIENT)
Dept: INTERVENTIONAL RADIOLOGY/VASCULAR | Facility: CLINIC | Age: 61
End: 2021-05-24
Payer: COMMERCIAL

## 2021-05-24 DIAGNOSIS — I67.1 CEREBRAL ANEURYSM: Primary | ICD-10-CM

## 2021-05-24 DIAGNOSIS — R20.2 FACIAL PARESTHESIA: ICD-10-CM

## 2021-05-24 DIAGNOSIS — Z79.01 LONG TERM (CURRENT) USE OF ANTICOAGULANTS: ICD-10-CM

## 2021-05-24 DIAGNOSIS — I48.0 PAROXYSMAL ATRIAL FIBRILLATION: ICD-10-CM

## 2021-05-24 DIAGNOSIS — I67.1 NONRUPTURED CEREBRAL ANEURYSM: ICD-10-CM

## 2021-05-24 DIAGNOSIS — Z72.0 TOBACCO ABUSE: ICD-10-CM

## 2021-05-24 PROCEDURE — 99244 OFF/OP CNSLTJ NEW/EST MOD 40: CPT | Mod: 95,,, | Performed by: PHYSICIAN ASSISTANT

## 2021-05-24 PROCEDURE — 99244 PR OFFICE CONSULTATION,LEVEL IV: ICD-10-PCS | Mod: 95,,, | Performed by: PHYSICIAN ASSISTANT

## 2021-05-25 ENCOUNTER — PATIENT MESSAGE (OUTPATIENT)
Dept: FAMILY MEDICINE | Facility: CLINIC | Age: 61
End: 2021-05-25

## 2021-05-31 RX ORDER — HEPARIN SODIUM 1000 [USP'U]/ML
5000 INJECTION, SOLUTION INTRAVENOUS; SUBCUTANEOUS ONCE
Status: CANCELLED | OUTPATIENT
Start: 2021-05-31 | End: 2021-05-31

## 2021-05-31 RX ORDER — FENTANYL CITRATE 50 UG/ML
50 INJECTION, SOLUTION INTRAMUSCULAR; INTRAVENOUS
Status: CANCELLED | OUTPATIENT
Start: 2021-05-31

## 2021-05-31 RX ORDER — MIDAZOLAM HYDROCHLORIDE 1 MG/ML
1 INJECTION INTRAMUSCULAR; INTRAVENOUS
Status: CANCELLED | OUTPATIENT
Start: 2021-05-31

## 2021-06-04 ENCOUNTER — TELEPHONE (OUTPATIENT)
Dept: GASTROENTEROLOGY | Facility: CLINIC | Age: 61
End: 2021-06-04

## 2021-06-07 ENCOUNTER — HOSPITAL ENCOUNTER (OUTPATIENT)
Dept: INTERVENTIONAL RADIOLOGY/VASCULAR | Facility: HOSPITAL | Age: 61
Discharge: HOME | End: 2021-06-07
Attending: PHYSICIAN ASSISTANT
Payer: COMMERCIAL

## 2021-06-07 VITALS
OXYGEN SATURATION: 97 % | BODY MASS INDEX: 29.62 KG/M2 | HEART RATE: 70 BPM | RESPIRATION RATE: 16 BRPM | DIASTOLIC BLOOD PRESSURE: 70 MMHG | HEIGHT: 69 IN | SYSTOLIC BLOOD PRESSURE: 141 MMHG | WEIGHT: 200 LBS | TEMPERATURE: 98 F

## 2021-06-07 DIAGNOSIS — I67.1 CEREBRAL ANEURYSM: ICD-10-CM

## 2021-06-07 PROCEDURE — 76377 3D RENDER W/INTRP POSTPROCES: CPT | Mod: TC | Performed by: RADIOLOGY

## 2021-06-07 PROCEDURE — C1894 INTRO/SHEATH, NON-LASER: HCPCS

## 2021-06-07 PROCEDURE — 76377 PR  3D RENDERING W/ IMAGE POSTPROCESS: ICD-10-PCS | Mod: 26,,, | Performed by: RADIOLOGY

## 2021-06-07 PROCEDURE — 99153 MOD SED SAME PHYS/QHP EA: CPT | Performed by: RADIOLOGY

## 2021-06-07 PROCEDURE — 36226 PR ANGIO VERTEBRAL ARTERY +/- CERVIOCEREBRAL ARCH, VERTEBRAL ART, SELECTV CATH,S&I: ICD-10-PCS | Mod: 50,51,, | Performed by: RADIOLOGY

## 2021-06-07 PROCEDURE — 76377 3D RENDER W/INTRP POSTPROCES: CPT | Mod: 26,,, | Performed by: RADIOLOGY

## 2021-06-07 PROCEDURE — 36226 PLACE CATH VERTEBRAL ART: CPT | Mod: 50 | Performed by: RADIOLOGY

## 2021-06-07 PROCEDURE — 36224 IR ANGIOGRAM CAROTID INTERNAL INC ARCH AND CEREBRAL BILAT: ICD-10-PCS | Mod: 50,,, | Performed by: RADIOLOGY

## 2021-06-07 PROCEDURE — 99152 MOD SED SAME PHYS/QHP 5/>YRS: CPT | Mod: ,,, | Performed by: RADIOLOGY

## 2021-06-07 PROCEDURE — 99152 MOD SED SAME PHYS/QHP 5/>YRS: CPT | Performed by: RADIOLOGY

## 2021-06-07 PROCEDURE — 99152 PR MOD CONSCIOUS SEDATION, SAME PHYS, 5+ YRS, FIRST 15 MIN: ICD-10-PCS | Mod: ,,, | Performed by: RADIOLOGY

## 2021-06-07 PROCEDURE — 36226 PLACE CATH VERTEBRAL ART: CPT | Mod: 50,51,, | Performed by: RADIOLOGY

## 2021-06-07 PROCEDURE — 36224 PLACE CATH CAROTD ART: CPT | Mod: 50 | Performed by: RADIOLOGY

## 2021-06-07 PROCEDURE — 63600175 PHARM REV CODE 636 W HCPCS: Performed by: RADIOLOGY

## 2021-06-07 PROCEDURE — G0269 OCCLUSIVE DEVICE IN VEIN ART: HCPCS | Performed by: RADIOLOGY

## 2021-06-07 PROCEDURE — 25000003 PHARM REV CODE 250: Performed by: RADIOLOGY

## 2021-06-07 RX ORDER — MIDAZOLAM HYDROCHLORIDE 1 MG/ML
INJECTION INTRAMUSCULAR; INTRAVENOUS CODE/TRAUMA/SEDATION MEDICATION
Status: COMPLETED | OUTPATIENT
Start: 2021-06-07 | End: 2021-06-07

## 2021-06-07 RX ORDER — FENTANYL CITRATE 50 UG/ML
INJECTION, SOLUTION INTRAMUSCULAR; INTRAVENOUS CODE/TRAUMA/SEDATION MEDICATION
Status: COMPLETED | OUTPATIENT
Start: 2021-06-07 | End: 2021-06-07

## 2021-06-07 RX ORDER — SODIUM CHLORIDE 9 MG/ML
INJECTION, SOLUTION INTRAVENOUS CONTINUOUS
Status: DISCONTINUED | OUTPATIENT
Start: 2021-06-07 | End: 2021-06-08 | Stop reason: HOSPADM

## 2021-06-07 RX ORDER — IODIXANOL 320 MG/ML
80 INJECTION, SOLUTION INTRAVASCULAR
Status: DISCONTINUED | OUTPATIENT
Start: 2021-06-07 | End: 2021-06-08 | Stop reason: HOSPADM

## 2021-06-07 RX ORDER — LIDOCAINE HYDROCHLORIDE 10 MG/ML
INJECTION INFILTRATION; PERINEURAL CODE/TRAUMA/SEDATION MEDICATION
Status: COMPLETED | OUTPATIENT
Start: 2021-06-07 | End: 2021-06-07

## 2021-06-07 RX ORDER — SODIUM CHLORIDE 0.9 % (FLUSH) 0.9 %
10 SYRINGE (ML) INJECTION
Status: DISCONTINUED | OUTPATIENT
Start: 2021-06-07 | End: 2021-06-08 | Stop reason: HOSPADM

## 2021-06-07 RX ADMIN — FENTANYL CITRATE 50 MCG: 50 INJECTION, SOLUTION INTRAMUSCULAR; INTRAVENOUS at 10:06

## 2021-06-07 RX ADMIN — MIDAZOLAM HYDROCHLORIDE 1 MG: 1 INJECTION INTRAMUSCULAR; INTRAVENOUS at 10:06

## 2021-06-07 RX ADMIN — MIDAZOLAM HYDROCHLORIDE 1 MG: 1 INJECTION INTRAMUSCULAR; INTRAVENOUS at 09:06

## 2021-06-07 RX ADMIN — FENTANYL CITRATE 50 MCG: 50 INJECTION, SOLUTION INTRAMUSCULAR; INTRAVENOUS at 09:06

## 2021-06-07 RX ADMIN — LIDOCAINE HYDROCHLORIDE 5 ML: 10 INJECTION, SOLUTION INFILTRATION; PERINEURAL at 09:06

## 2021-06-10 ENCOUNTER — TELEPHONE (OUTPATIENT)
Dept: GASTROENTEROLOGY | Facility: CLINIC | Age: 61
End: 2021-06-10

## 2021-07-12 ENCOUNTER — CLINICAL SUPPORT (OUTPATIENT)
Dept: NEUROSURGERY | Facility: CLINIC | Age: 61
End: 2021-07-12
Payer: COMMERCIAL

## 2021-07-12 VITALS — WEIGHT: 199.94 LBS | BODY MASS INDEX: 29.61 KG/M2 | HEIGHT: 69 IN

## 2021-07-12 DIAGNOSIS — I10 HYPERTENSION, UNSPECIFIED TYPE: ICD-10-CM

## 2021-07-12 DIAGNOSIS — I67.1 CEREBRAL ANEURYSM: ICD-10-CM

## 2021-07-12 DIAGNOSIS — Z72.0 TOBACCO USE: Primary | ICD-10-CM

## 2021-07-12 PROCEDURE — 99214 OFFICE O/P EST MOD 30 MIN: CPT | Mod: S$GLB,,, | Performed by: PHYSICIAN ASSISTANT

## 2021-07-12 PROCEDURE — 99999 PR PBB SHADOW E&M-EST. PATIENT-LVL III: CPT | Mod: PBBFAC,,, | Performed by: PHYSICIAN ASSISTANT

## 2021-07-12 PROCEDURE — 99999 PR PBB SHADOW E&M-EST. PATIENT-LVL III: ICD-10-PCS | Mod: PBBFAC,,, | Performed by: PHYSICIAN ASSISTANT

## 2021-07-12 PROCEDURE — 99214 PR OFFICE/OUTPT VISIT, EST, LEVL IV, 30-39 MIN: ICD-10-PCS | Mod: S$GLB,,, | Performed by: PHYSICIAN ASSISTANT

## 2021-07-17 ENCOUNTER — OFFICE VISIT (OUTPATIENT)
Dept: URGENT CARE | Facility: CLINIC | Age: 61
End: 2021-07-17
Payer: COMMERCIAL

## 2021-07-17 ENCOUNTER — PATIENT MESSAGE (OUTPATIENT)
Dept: ADMINISTRATIVE | Facility: CLINIC | Age: 61
End: 2021-07-17

## 2021-07-17 VITALS
TEMPERATURE: 98 F | BODY MASS INDEX: 29.62 KG/M2 | RESPIRATION RATE: 18 BRPM | SYSTOLIC BLOOD PRESSURE: 140 MMHG | HEIGHT: 69 IN | DIASTOLIC BLOOD PRESSURE: 81 MMHG | HEART RATE: 93 BPM | WEIGHT: 200 LBS | OXYGEN SATURATION: 97 %

## 2021-07-17 DIAGNOSIS — U07.1 COVID-19: Primary | ICD-10-CM

## 2021-07-17 DIAGNOSIS — R05.9 COUGH: ICD-10-CM

## 2021-07-17 LAB
CTP QC/QA: YES
SARS-COV-2 RDRP RESP QL NAA+PROBE: POSITIVE

## 2021-07-17 PROCEDURE — 1126F AMNT PAIN NOTED NONE PRSNT: CPT | Mod: S$GLB,,, | Performed by: PHYSICIAN ASSISTANT

## 2021-07-17 PROCEDURE — 99214 OFFICE O/P EST MOD 30 MIN: CPT | Mod: S$GLB,,, | Performed by: PHYSICIAN ASSISTANT

## 2021-07-17 PROCEDURE — 99214 PR OFFICE/OUTPT VISIT, EST, LEVL IV, 30-39 MIN: ICD-10-PCS | Mod: S$GLB,,, | Performed by: PHYSICIAN ASSISTANT

## 2021-07-17 PROCEDURE — 1126F PR PAIN SEVERITY QUANTIFIED, NO PAIN PRESENT: ICD-10-PCS | Mod: S$GLB,,, | Performed by: PHYSICIAN ASSISTANT

## 2021-07-17 PROCEDURE — 3008F PR BODY MASS INDEX (BMI) DOCUMENTED: ICD-10-PCS | Mod: CPTII,S$GLB,, | Performed by: PHYSICIAN ASSISTANT

## 2021-07-17 PROCEDURE — 3008F BODY MASS INDEX DOCD: CPT | Mod: CPTII,S$GLB,, | Performed by: PHYSICIAN ASSISTANT

## 2021-07-17 PROCEDURE — U0002 COVID-19 LAB TEST NON-CDC: HCPCS | Mod: QW,S$GLB,, | Performed by: PHYSICIAN ASSISTANT

## 2021-07-17 PROCEDURE — U0002: ICD-10-PCS | Mod: QW,S$GLB,, | Performed by: PHYSICIAN ASSISTANT

## 2021-07-17 RX ORDER — PROMETHAZINE HYDROCHLORIDE AND DEXTROMETHORPHAN HYDROBROMIDE 6.25; 15 MG/5ML; MG/5ML
5 SYRUP ORAL 3 TIMES DAILY PRN
Qty: 180 ML | Refills: 0 | Status: SHIPPED | OUTPATIENT
Start: 2021-07-17 | End: 2021-07-27

## 2021-07-19 ENCOUNTER — NURSE TRIAGE (OUTPATIENT)
Dept: ADMINISTRATIVE | Facility: CLINIC | Age: 61
End: 2021-07-19

## 2021-07-19 ENCOUNTER — PATIENT MESSAGE (OUTPATIENT)
Dept: FAMILY MEDICINE | Facility: CLINIC | Age: 61
End: 2021-07-19

## 2021-07-19 ENCOUNTER — PATIENT MESSAGE (OUTPATIENT)
Dept: ADMINISTRATIVE | Facility: OTHER | Age: 61
End: 2021-07-19

## 2021-07-19 ENCOUNTER — PATIENT MESSAGE (OUTPATIENT)
Dept: ADMINISTRATIVE | Facility: CLINIC | Age: 61
End: 2021-07-19

## 2021-07-19 ENCOUNTER — TELEPHONE (OUTPATIENT)
Dept: ADMINISTRATIVE | Facility: CLINIC | Age: 61
End: 2021-07-19

## 2021-07-20 ENCOUNTER — NURSE TRIAGE (OUTPATIENT)
Dept: ADMINISTRATIVE | Facility: CLINIC | Age: 61
End: 2021-07-20

## 2022-01-03 ENCOUNTER — OFFICE VISIT (OUTPATIENT)
Dept: FAMILY MEDICINE | Facility: CLINIC | Age: 62
End: 2022-01-03
Payer: COMMERCIAL

## 2022-01-03 VITALS
DIASTOLIC BLOOD PRESSURE: 78 MMHG | BODY MASS INDEX: 31.49 KG/M2 | WEIGHT: 207.81 LBS | TEMPERATURE: 98 F | OXYGEN SATURATION: 97 % | RESPIRATION RATE: 16 BRPM | HEART RATE: 85 BPM | HEIGHT: 68 IN | SYSTOLIC BLOOD PRESSURE: 126 MMHG

## 2022-01-03 DIAGNOSIS — E78.5 HYPERLIPIDEMIA, UNSPECIFIED HYPERLIPIDEMIA TYPE: ICD-10-CM

## 2022-01-03 DIAGNOSIS — Z12.11 SCREEN FOR COLON CANCER: ICD-10-CM

## 2022-01-03 DIAGNOSIS — I67.1 CEREBRAL ANEURYSM: ICD-10-CM

## 2022-01-03 DIAGNOSIS — M18.11 ARTHRITIS OF CARPOMETACARPAL (CMC) JOINT OF RIGHT THUMB: ICD-10-CM

## 2022-01-03 DIAGNOSIS — L98.9 SKIN LESION: ICD-10-CM

## 2022-01-03 DIAGNOSIS — I48.0 PAROXYSMAL ATRIAL FIBRILLATION: ICD-10-CM

## 2022-01-03 DIAGNOSIS — I70.90 ATHEROSCLEROSIS: ICD-10-CM

## 2022-01-03 DIAGNOSIS — Z12.31 ENCOUNTER FOR SCREENING MAMMOGRAM FOR MALIGNANT NEOPLASM OF BREAST: ICD-10-CM

## 2022-01-03 DIAGNOSIS — G43.709 CHRONIC MIGRAINE WITHOUT AURA WITHOUT STATUS MIGRAINOSUS, NOT INTRACTABLE: ICD-10-CM

## 2022-01-03 DIAGNOSIS — I10 PRIMARY HYPERTENSION: ICD-10-CM

## 2022-01-03 DIAGNOSIS — R79.89 ELEVATED LFTS: ICD-10-CM

## 2022-01-03 DIAGNOSIS — Z72.0 TOBACCO ABUSE: ICD-10-CM

## 2022-01-03 DIAGNOSIS — Z23 NEED FOR SHINGLES VACCINE: ICD-10-CM

## 2022-01-03 DIAGNOSIS — Z00.00 WELL ADULT EXAM: Primary | ICD-10-CM

## 2022-01-03 DIAGNOSIS — K59.00 CONSTIPATION, UNSPECIFIED CONSTIPATION TYPE: ICD-10-CM

## 2022-01-03 DIAGNOSIS — R73.09 ELEVATED GLUCOSE: ICD-10-CM

## 2022-01-03 DIAGNOSIS — F33.0 MAJOR DEPRESSIVE DISORDER, RECURRENT EPISODE, MILD: ICD-10-CM

## 2022-01-03 DIAGNOSIS — Z23 NEED FOR IMMUNIZATION AGAINST INFLUENZA: ICD-10-CM

## 2022-01-03 DIAGNOSIS — K21.9 GASTROESOPHAGEAL REFLUX DISEASE WITHOUT ESOPHAGITIS: ICD-10-CM

## 2022-01-03 DIAGNOSIS — I25.10 CORONARY ARTERY DISEASE INVOLVING NATIVE CORONARY ARTERY OF NATIVE HEART WITHOUT ANGINA PECTORIS: ICD-10-CM

## 2022-01-03 PROCEDURE — 90471 FLU VACCINE (QUAD) GREATER THAN OR EQUAL TO 3YO PRESERVATIVE FREE IM: ICD-10-PCS | Mod: S$GLB,,, | Performed by: INTERNAL MEDICINE

## 2022-01-03 PROCEDURE — 3078F DIAST BP <80 MM HG: CPT | Mod: CPTII,S$GLB,, | Performed by: INTERNAL MEDICINE

## 2022-01-03 PROCEDURE — 99396 PREV VISIT EST AGE 40-64: CPT | Mod: 25,S$GLB,, | Performed by: INTERNAL MEDICINE

## 2022-01-03 PROCEDURE — 1160F RVW MEDS BY RX/DR IN RCRD: CPT | Mod: CPTII,S$GLB,, | Performed by: INTERNAL MEDICINE

## 2022-01-03 PROCEDURE — 90471 IMMUNIZATION ADMIN: CPT | Mod: S$GLB,,, | Performed by: INTERNAL MEDICINE

## 2022-01-03 PROCEDURE — 1159F PR MEDICATION LIST DOCUMENTED IN MEDICAL RECORD: ICD-10-PCS | Mod: CPTII,S$GLB,, | Performed by: INTERNAL MEDICINE

## 2022-01-03 PROCEDURE — 90686 IIV4 VACC NO PRSV 0.5 ML IM: CPT | Mod: S$GLB,,, | Performed by: INTERNAL MEDICINE

## 2022-01-03 PROCEDURE — 3008F PR BODY MASS INDEX (BMI) DOCUMENTED: ICD-10-PCS | Mod: CPTII,S$GLB,, | Performed by: INTERNAL MEDICINE

## 2022-01-03 PROCEDURE — 99396 PR PREVENTIVE VISIT,EST,40-64: ICD-10-PCS | Mod: 25,S$GLB,, | Performed by: INTERNAL MEDICINE

## 2022-01-03 PROCEDURE — 3008F BODY MASS INDEX DOCD: CPT | Mod: CPTII,S$GLB,, | Performed by: INTERNAL MEDICINE

## 2022-01-03 PROCEDURE — 90686 FLU VACCINE (QUAD) GREATER THAN OR EQUAL TO 3YO PRESERVATIVE FREE IM: ICD-10-PCS | Mod: S$GLB,,, | Performed by: INTERNAL MEDICINE

## 2022-01-03 PROCEDURE — 3074F SYST BP LT 130 MM HG: CPT | Mod: CPTII,S$GLB,, | Performed by: INTERNAL MEDICINE

## 2022-01-03 PROCEDURE — 3078F PR MOST RECENT DIASTOLIC BLOOD PRESSURE < 80 MM HG: ICD-10-PCS | Mod: CPTII,S$GLB,, | Performed by: INTERNAL MEDICINE

## 2022-01-03 PROCEDURE — 1160F PR REVIEW ALL MEDS BY PRESCRIBER/CLIN PHARMACIST DOCUMENTED: ICD-10-PCS | Mod: CPTII,S$GLB,, | Performed by: INTERNAL MEDICINE

## 2022-01-03 PROCEDURE — 3074F PR MOST RECENT SYSTOLIC BLOOD PRESSURE < 130 MM HG: ICD-10-PCS | Mod: CPTII,S$GLB,, | Performed by: INTERNAL MEDICINE

## 2022-01-03 PROCEDURE — 1159F MED LIST DOCD IN RCRD: CPT | Mod: CPTII,S$GLB,, | Performed by: INTERNAL MEDICINE

## 2022-01-03 RX ORDER — TOPIRAMATE 50 MG/1
50 TABLET, FILM COATED ORAL 2 TIMES DAILY
Qty: 60 TABLET | Refills: 11 | Status: SHIPPED | OUTPATIENT
Start: 2022-01-03 | End: 2022-01-13 | Stop reason: SDUPTHER

## 2022-01-03 RX ORDER — TOPIRAMATE 25 MG/1
TABLET ORAL
Qty: 70 TABLET | Refills: 0 | Status: SHIPPED | OUTPATIENT
Start: 2022-01-03 | End: 2022-02-10

## 2022-01-03 RX ORDER — ZOSTER VACCINE RECOMBINANT, ADJUVANTED 50 MCG/0.5
0.5 KIT INTRAMUSCULAR ONCE
Qty: 1 EACH | Refills: 1 | Status: SHIPPED | OUTPATIENT
Start: 2022-01-03 | End: 2022-01-03

## 2022-01-03 NOTE — PROGRESS NOTES
Subjective:       Patient ID: Tanya Espinoza is a 61 y.o. female.    Medication List with Changes/Refills   New Medications    TOPIRAMATE (TOPAMAX) 25 MG TABLET    Take one tablet in pm for 1 week then 1 pill bid for 1 week then 1 pill in am and 2 pills qhs for 1 week then 2 pills bid    TOPIRAMATE (TOPAMAX) 50 MG TABLET    Take 1 tablet (50 mg total) by mouth 2 (two) times daily.    VARICELLA-ZOSTER GE-AS01B, PF, (SHINGRIX, PF,) 50 MCG/0.5 ML INJECTION    Inject 0.5 mLs into the muscle once. for 1 dose   Current Medications    APIXABAN (ELIQUIS) 5 MG TAB    Take 5 mg by mouth 2 (two) times a day.    ASPIRIN (ECOTRIN) 81 MG EC TABLET    Take 1 tablet (81 mg total) by mouth once daily.    BISOPROLOL (ZEBETA) 5 MG TABLET    Take 1 tablet (5 mg total) by mouth once daily.    FLECAINIDE (TAMBOCOR) 100 MG TAB    Take 100 mg by mouth every 12 (twelve) hours.    MULTIVITAMIN WITH MINERALS TABLET    Take 1 tablet by mouth once daily.    NITROGLYCERIN (NITROSTAT) 0.4 MG SL TABLET    Place 1 tablet (0.4 mg total) under the tongue every 5 (five) minutes as needed for Chest pain.    PANTOPRAZOLE (PROTONIX) 40 MG TABLET    Take 1 tablet (40 mg total) by mouth once daily.    ROSUVASTATIN (CRESTOR) 40 MG TAB    Take 1 tablet (40 mg total) by mouth every evening.   Discontinued Medications    DEXTROAMPHETAMINE-AMPHETAMINE 30 MG TAB    Take 1 tablet by mouth 2 (two) times a day.    FUROSEMIDE (LASIX) 20 MG TABLET    Take 1 tablet (20 mg total) by mouth daily as needed (fluid in legs).    POLYETHYLENE GLYCOL (GLYCOLAX) 17 GRAM PWPK    Take 17 g by mouth once daily.    PULSE OXIMETER (PULSE OXIMETER) DEVICE    by Apply Externally route 2 (two) times a day. Use twice daily at 8 AM and 3 PM and [facility] as directed.    SULFAMETHOXAZOLE-TRIMETHOPRIM 800-160MG (BACTRIM DS) 800-160 MG TAB    Take 1 tablet by mouth 2 (two) times daily.       Chief Complaint: Hospital Follow Up ([facility] for facial numbness,  fluctuating bp, sob, migraines)  She is here today to f/u on chronic medical issues and her ER visit yesterday.     She has a known aneurysm and she started with headaches and facial numbness. 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 She was given treatment for acute migraine that immediately improved her symptoms. Today she is headache free.     She has Afib and is s/p RFA on 4/25/2021.  During her stay she was noted to have elevated cardiac enzymes and subsequent nuclear stress test on 4/2021 showed positive ischemia with a small reversible defect in anterobasilar wall.  Echo showed EF 50%, no diastolic dysfunction, LBBB, mild MR, PAP of 25.  Subsequent LHC on 5/2021 showed mild diffuse disease.  She continues on bisoprolol 5 mg daily, eqluis 5 mg bid and flecainide 100 mg bid.  She continues with persistent shortness of breath when she has palpitations. She is due to f/u with cardiology.      She has hyperlipidemia and is taking crestor 40 mg daily.   Lipids before increasing the dose to 40 mg on 5/2021 were 227/129/73/128.      She has a saccular basilar tip aneurysm noted on MRA on 5/2021 (done for headaches and stroke like symptoms) that showed 3 mm aneurysm.  She was seen by NS on 7/2021 who advised repeat CTA in one year. Due to recheck facial numbness while in ER CTA was repeat and showed 3.5 c 3 x 2.5 mm aneurysm that is unchanged from MRA done on 5/2021.      She continues with elevated LFTs with labs on 1/2022 showing AST 63 and ALT of 153. She has u /s of the abdomen on 5/2021 that showed hepatomegaly and left hepatic lobe cyst.       She continues to smoke cigarettes but is down to 2 cigs a day. She has a 40 pack year history. CTA of the lungs on 5/2021 done for shortness of breath were negative for PE or pulmonary nodules. It did show atherosclerosis.     She has GERD and is taking  pantoprazole with some relief. She continues to have belching.      She has constipation for many years. She is taking miralax daily. She says this has caused her stools to lighten.  No blood in stool. She has never had a colonoscopy and does not want at this time.      She has a history of ADD and was on stimulants for years. She continues off adderall and reports feeling very sleepy since stopping about 10 days ago.      She does complain of depression and anxiety symptoms due to her daughter's drug addiction.  She is very stress and worried. She is not sleeping well due to her shortness of breath. No suicidal ideations.  She is not on treatment.     Today she complains of worsening migraines. She reports having migraines for many years but is getting more frequently. She has about 2 to 3 headaches a week. She describes as throbbing on left side involving her eye with associated light sensitivity and nausea. She has some visual changes. In ER yesterday she was treated with IV medications and is doing better today. Usually she treats with sleep.      She has a lesion on upper right arm that is draining fluid and macerated. She had a similar lesion on lower arm that was skin cancer.  She is scheduled to see dermatology for evaluation this month.     She complains of pain at the base of her right thumb for a couple months. It is painful with movement. No swelling or redness to the joint. She is limited with ROM due to pain.      She lives with her  and feels safe at home. 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 She does not exercise and does report increasing weight gain. She does eat healthy.      Colonoscopy---never/refused   Mammogram----8/2017 neg---due   Pap-----8/2017 neg HPV neg  Tdap---8/2016  Influenza vaccine---refused    Pneumovax 23----7/2017  Shingrex vaccine-----none  Covid vaccine---none (had covid on 7/2021 treated with monoclonal ab).     Review of Systems   Constitutional: Negative for appetite change,  "fatigue, fever and unexpected weight change.   HENT: Negative for congestion, ear pain, hearing loss, sore throat and trouble swallowing.    Eyes: Negative for pain and visual disturbance.   Respiratory: Positive for shortness of breath. Negative for cough, chest tightness and wheezing.    Cardiovascular: Positive for palpitations. Negative for chest pain and leg swelling.   Gastrointestinal: Positive for constipation. Negative for abdominal pain, blood in stool, diarrhea, nausea and vomiting.   Endocrine: Negative for polyuria.   Genitourinary: Negative for dysuria and hematuria.   Musculoskeletal: Negative for arthralgias, back pain and myalgias.   Skin: Negative for rash.   Neurological: Positive for headaches. Negative for dizziness, weakness and numbness.   Hematological: Does not bruise/bleed easily.   Psychiatric/Behavioral: Positive for dysphoric mood. Negative for sleep disturbance and suicidal ideas. The patient is nervous/anxious.        Objective:      Vitals:    01/03/22 1053   BP: 126/78   BP Location: Left arm   Patient Position: Sitting   BP Method: Large (Manual)   Pulse: 85   Resp: 16   Temp: 98.1 °F (36.7 °C)   TempSrc: Skin   SpO2: 97%   Weight: 94.3 kg (207 lb 12.5 oz)   Height: 5' 8" (1.727 m)     Body mass index is 31.59 kg/m².  Physical Exam    General appearance: No acute distress, cooperative  Eyes: PERRL, EOMI, conjunctiva clear  Ears: normal external ear and pinna, tm clear without drainage, canals clear  Nose: Normal mucosa without drainage  Throat: no exudates or erythema, tonsils not enlarged  Mouth: no sores or lesions, moist mucous membranes  Neck: FROM, soft, supple, no thyromegaly, no bruits  Lymph: no anterior or posterior cervical adenopathy  Heart::  Regular rate and rhythm, no murmur  Lung: Clear to ascultation bilaterally, no wheezing, no rales, no rhonchi, no distress  Abdomen: Soft, diffuse tenderness, no distention, no hepatosplenomegaly, bowel sounds normal, no guarding, " no rebound, no peritoneal signs  Skin: no rashes, right upper arm erythematous raised lesion that was macerated   Extremities: no edema, no cyanosis  Neuro: CN 2-12 intact, 5/5 muscle strength upper and lower extremity bilaterally, 2+ DTRs UE and LE bilaterally, normal gait  Peripheral pulses: 2+ pedal pulses bilaterally, good perfusion and color  Musculoskeletal: FROM, good strenth, no tenderness  Joint: normal appearance, no swelling, no warmth, no deformity in all joints    Assessment:       1. Well adult exam    2. Paroxysmal atrial fibrillation    3. Coronary artery disease involving native coronary artery of native heart without angina pectoris    4. Primary hypertension    5. Hyperlipidemia, unspecified hyperlipidemia type    6. Atherosclerosis    7. Cerebral aneurysm    8. Tobacco abuse    9. Gastroesophageal reflux disease without esophagitis    10. Elevated LFTs    11. Constipation, unspecified constipation type    12. Elevated glucose    13. Major depressive disorder, recurrent episode, mild    14. Arthritis of carpometacarpal (CMC) joint of right thumb    15. Chronic migraine without aura without status migrainosus, not intractable    16. Skin lesion    17. Encounter for screening mammogram for malignant neoplasm of breast    18. Screen for colon cancer    19. Need for immunization against influenza    20. Need for shingles vaccine        Plan:       Well adult exam  She was given influenza vaccine today and advised to start covid series. Rx given for shingrex to start one month after completion of covid series. She was scheduled for a mammogram and given fecal kit (refused colonoscopy).    Paroxysmal atrial fibrillation  She continues to have symptomatic Afib intermittent. Continue elquis and bisoprolol and flecainide. She is past due to f/u with cardiology.    Coronary artery disease involving native coronary artery of native heart without angina pectoris  No active symptoms. Continue to  monitor.    Primary hypertension  Well controlled and continue current regimen.   -     CBC Auto Differential; Future; Expected date: 01/03/2022  -     Comprehensive Metabolic Panel; Future; Expected date: 01/03/2022    Hyperlipidemia, unspecified hyperlipidemia type  She needs to recheck lipids after increasing her dose of crestor to 40 mg daily. Continue aspirin.    Atherosclerosis  Continue statin and aspirin.    Cerebral aneurysm  Recent imaging is stable.  Continue statin and aspirin and BP control.    Tobacco abuse  Strongly advised to stop smoking.    Gastroesophageal reflux disease without esophagitis  Well controlled and continue current regimen.     Elevated LFTs  AGgain noted to be elevated with hepatomegaly on u/s. Continue to monitor and consider referral to hepatology.    Constipation, unspecified constipation type  Continue miralax.    Elevated glucose  -     Hemoglobin A1C; Future; Expected date: 01/03/2022    Major depressive disorder, recurrent episode, mild  Stable off medication. Continue to monitor.    Arthritis of carpometacarpal (CMC) joint of right thumb  Suspect OA vs tendonitis. Referral to hand surgeon for evaluation.   -     Ambulatory referral/consult to Hand Surgery; Future; Expected date: 01/10/2022    Chronic migraine without aura without status migrainosus, not intractable  Uncontrolled and will start topamax titrating up her dose over a month to a goal of 50 mg bid. She will f/u with me in 6 weeks to recheck with headache journal.   -     topiramate (TOPAMAX) 25 MG tablet; Take one tablet in pm for 1 week then 1 pill bid for 1 week then 1 pill in am and 2 pills qhs for 1 week then 2 pills bid  Dispense: 70 tablet; Refill: 0  -     topiramate (TOPAMAX) 50 MG tablet; Take 1 tablet (50 mg total) by mouth 2 (two) times daily.  Dispense: 60 tablet; Refill: 11    Skin lesion concerning for neoplasm   She has appt with dermatology for evaluation.    Encounter for screening mammogram for  malignant neoplasm of breast  -     Mammo Digital Screening Bilat w/ Sean; Future; Expected date: 01/03/2022    Screen for colon cancer  -     Fecal Immunochemical Test (iFOBT); Future; Expected date: 01/03/2022    Need for immunization against influenza  -     Influenza - Quadrivalent (PF)    Need for shingles vaccine  -     varicella-zoster gE-AS01B, PF, (SHINGRIX, PF,) 50 mcg/0.5 mL injection; Inject 0.5 mLs into the muscle once. for 1 dose  Dispense: 1 each; Refill: 1    Follow up in about 6 weeks (around 2/14/2022) for recheck migraines and 6 months for chronic medical issues .

## 2022-01-05 ENCOUNTER — TELEPHONE (OUTPATIENT)
Dept: NEUROLOGY | Facility: CLINIC | Age: 62
End: 2022-01-05
Payer: COMMERCIAL

## 2022-01-05 ENCOUNTER — LAB VISIT (OUTPATIENT)
Dept: LAB | Facility: HOSPITAL | Age: 62
End: 2022-01-05
Attending: INTERNAL MEDICINE
Payer: COMMERCIAL

## 2022-01-05 DIAGNOSIS — Z12.11 SCREEN FOR COLON CANCER: ICD-10-CM

## 2022-01-05 PROCEDURE — 82274 ASSAY TEST FOR BLOOD FECAL: CPT | Performed by: INTERNAL MEDICINE

## 2022-01-05 NOTE — TELEPHONE ENCOUNTER
Returned call to patient to schedule hospital follow up. Patient states she needs to see someone for an enlarging aneurysm. Advised will need to see neurosurgery for this. Patient is established with a neurosurgeon and will contact them to schedule.

## 2022-01-05 NOTE — TELEPHONE ENCOUNTER
----- Message from Asmita Durham sent at 1/5/2022  4:30 PM CST -----  Contact: patient  Type:  Appointment Request    Caller is requesting a sooner appointment.      Name of Caller:  patient  When is the first available appointment?  N/a  Symptoms:  hospital f/u-   Best Call Back Number:  [phone] (home)   Additional Information:  needs f/u in one week

## 2022-01-06 ENCOUNTER — TELEPHONE (OUTPATIENT)
Dept: FAMILY MEDICINE | Facility: CLINIC | Age: 62
End: 2022-01-06
Payer: COMMERCIAL

## 2022-01-06 DIAGNOSIS — G43.709 CHRONIC MIGRAINE WITHOUT AURA WITHOUT STATUS MIGRAINOSUS, NOT INTRACTABLE: ICD-10-CM

## 2022-01-06 NOTE — TELEPHONE ENCOUNTER
----- Message from Ilan Gonzalez sent at 1/6/2022  1:33 PM CST -----  Regarding: NEW SCRIPT & recent fall  Contact: Ghislaine Sorto(daughter) is requesting a call back at [phone] she stated that the attached pt had a fall and it was bad she bled on her paper scripts  she wanted to know if new script could be sent to pharmacy and if pt could be seen today if possible to get checked out after fall

## 2022-01-10 ENCOUNTER — PATIENT OUTREACH (OUTPATIENT)
Dept: ADMINISTRATIVE | Facility: OTHER | Age: 62
End: 2022-01-10
Payer: COMMERCIAL

## 2022-01-10 NOTE — PROGRESS NOTES
LINKS immunization registry updated  Care Everywhere updated  Health Maintenance updated  Chart reviewed for overdue Proactive [facility] Encounters (ALESSANDRO) health maintenance testing (CRS, Breast Ca, Diabetic Eye Exam)   Orders entered:N/A

## 2022-01-11 ENCOUNTER — LAB VISIT (OUTPATIENT)
Dept: LAB | Facility: HOSPITAL | Age: 62
End: 2022-01-11
Attending: INTERNAL MEDICINE
Payer: COMMERCIAL

## 2022-01-11 ENCOUNTER — TELEPHONE (OUTPATIENT)
Dept: CARDIOLOGY | Facility: CLINIC | Age: 62
End: 2022-01-11
Payer: COMMERCIAL

## 2022-01-11 ENCOUNTER — OFFICE VISIT (OUTPATIENT)
Dept: CARDIOLOGY | Facility: CLINIC | Age: 62
End: 2022-01-11
Payer: COMMERCIAL

## 2022-01-11 VITALS
HEIGHT: 68 IN | SYSTOLIC BLOOD PRESSURE: 131 MMHG | HEART RATE: 68 BPM | BODY MASS INDEX: 32.31 KG/M2 | WEIGHT: 213.19 LBS | DIASTOLIC BLOOD PRESSURE: 79 MMHG

## 2022-01-11 DIAGNOSIS — I48.0 PAROXYSMAL ATRIAL FIBRILLATION: ICD-10-CM

## 2022-01-11 DIAGNOSIS — I25.10 MILD CAD: ICD-10-CM

## 2022-01-11 DIAGNOSIS — R20.0 FACIAL NUMBNESS: ICD-10-CM

## 2022-01-11 DIAGNOSIS — Z72.0 TOBACCO ABUSE: Chronic | ICD-10-CM

## 2022-01-11 DIAGNOSIS — I49.9 IRREGULAR HEART BEATS: Primary | ICD-10-CM

## 2022-01-11 DIAGNOSIS — E66.9 OBESITY, CLASS I, BMI 30-34.9: ICD-10-CM

## 2022-01-11 DIAGNOSIS — I49.9 IRREGULAR HEART BEATS: ICD-10-CM

## 2022-01-11 DIAGNOSIS — R41.3 MEMORY LOSS: ICD-10-CM

## 2022-01-11 DIAGNOSIS — R53.83 OTHER FATIGUE: Chronic | ICD-10-CM

## 2022-01-11 DIAGNOSIS — I67.1 CEREBRAL ANEURYSM: ICD-10-CM

## 2022-01-11 LAB
HEMOCCULT STL QL IA: NEGATIVE
TSH SERPL DL<=0.005 MIU/L-ACNC: 2.12 UIU/ML (ref 0.4–4)

## 2022-01-11 PROCEDURE — 99999 PR PBB SHADOW E&M-EST. PATIENT-LVL IV: ICD-10-PCS | Mod: PBBFAC,,, | Performed by: INTERNAL MEDICINE

## 2022-01-11 PROCEDURE — 99214 OFFICE O/P EST MOD 30 MIN: CPT | Mod: S$GLB,,, | Performed by: INTERNAL MEDICINE

## 2022-01-11 PROCEDURE — 99999 PR PBB SHADOW E&M-EST. PATIENT-LVL IV: CPT | Mod: PBBFAC,,, | Performed by: INTERNAL MEDICINE

## 2022-01-11 PROCEDURE — 99214 PR OFFICE/OUTPT VISIT, EST, LEVL IV, 30-39 MIN: ICD-10-PCS | Mod: S$GLB,,, | Performed by: INTERNAL MEDICINE

## 2022-01-11 PROCEDURE — 84443 ASSAY THYROID STIM HORMONE: CPT | Performed by: INTERNAL MEDICINE

## 2022-01-11 PROCEDURE — 36415 COLL VENOUS BLD VENIPUNCTURE: CPT | Mod: PO | Performed by: INTERNAL MEDICINE

## 2022-01-11 NOTE — TELEPHONE ENCOUNTER
Pt need an office  visit with dr Granger due to irregular heart rhythm . There is a referral on epic . thank you

## 2022-01-11 NOTE — PROGRESS NOTES
Subjective:    Patient ID:  Tanya Espinoza is a 61 y.o. female who presents for Shortness of Breath, Irregular Heart Beat, and Palpitations        HPI  PATIENT STATES THAT SHE HAS BEEN HAVING RACING HEART ,S/P FALL,FACIAL PARESTHESIA, HUSBANDS STATES GETS CONFUSED, ANEUERYSM 3.5 CM, AGGRAVATED WITH NO APPT WITH NEURO AT [facility],STILL SMOKES,SAW NEURORADIOLOGY NO COILING,  LAST RFA PER DR COLE 6 MO AGO,  SEE ROS    Past Medical History:   Diagnosis Date    ADHD (attention deficit hyperactivity disorder)     Anemia     Anticoagulant long-term use     Anxiety     Coronary artery disease     CAD    Elevated LFTs     elevated enzymes    GERD (gastroesophageal reflux disease) 4/24/2021    Hyperlipemia 4/24/2021    Hypertension     LBBB (left bundle branch block)     Lower extremity edema     Major depressive disorder, recurrent episode, mild 1/3/2022    Obesity     Paroxysmal A-fib     Skin cancer     skin ca     Past Surgical History:   Procedure Laterality Date    ABLATION  04/25/2021    to heart for a-fib x 2    CORONARY ANGIOGRAPHY N/A 5/6/2021    Procedure: ANGIOGRAM, CORONARY ARTERY;  Surgeon: Jesus Sol MD;  [facility];  Service: Cardiology;  Laterality: N/A;    LEFT HEART CATHETERIZATION N/A 5/6/2021    Procedure: Left heart cath;  Surgeon: Jesus Sol MD;  Location: [facility];  Service: Cardiology;  Laterality: N/A;    TONSILLECTOMY      TUBAL LIGATION       Family History   Problem Relation Age of Onset    Diabetes Mother     Osteoporosis Maternal Grandmother     Pancreatic cancer Brother      Social History     Socioeconomic History    Marital status:    Occupational History    Occupation: [employer] and [email or web address]   Tobacco Use    Smoking status: Current Every Day Smoker     Packs/day: 1.00     Years: 40.00     Pack years: 40.00     Types: Cigarettes    Smokeless tobacco: Never Used    Tobacco comment: down to 0.25 pack of  cigarettes per day    Substance and Sexual Activity    Alcohol use: Not Currently    Drug use: No       Review of patient's allergies indicates:   Allergen Reactions    Effexor [venlafaxine] Other (See Comments)     Bruising all over body and elevated blood pressure       Current Outpatient Medications:     apixaban (ELIQUIS) 5 mg Tab, Take 5 mg by mouth 2 (two) times a day., Disp: , Rfl:     aspirin (ECOTRIN) 81 MG EC tablet, Take 1 tablet (81 mg total) by mouth once daily. (Patient taking differently: Take 81 mg by mouth 3 (three) times a week.), Disp: 90 tablet, Rfl: 1    bisoprolol (ZEBETA) 5 MG tablet, Take 1 tablet (5 mg total) by mouth once daily., Disp: 30 tablet, Rfl: 1    flecainide (TAMBOCOR) 100 MG Tab, Take 100 mg by mouth every 12 (twelve) hours., Disp: , Rfl:     multivitamin with minerals tablet, Take 1 tablet by mouth once daily., Disp: , Rfl:     nitroGLYCERIN (NITROSTAT) 0.4 MG SL tablet, Place 1 tablet (0.4 mg total) under the tongue every 5 (five) minutes as needed for Chest pain., Disp: 30 tablet, Rfl: 1    pantoprazole (PROTONIX) 40 MG tablet, Take 1 tablet (40 mg total) by mouth once daily., Disp: 90 tablet, Rfl: 2    rosuvastatin (CRESTOR) 40 MG Tab, Take 1 tablet (40 mg total) by mouth every evening., Disp: 90 tablet, Rfl: 3    topiramate (TOPAMAX) 25 MG tablet, Take one tablet in pm for 1 week then 1 pill bid for 1 week then 1 pill in am and 2 pills qhs for 1 week then 2 pills bid, Disp: 70 tablet, Rfl: 0    topiramate (TOPAMAX) 50 MG tablet, Take 1 tablet (50 mg total) by mouth 2 (two) times daily., Disp: 60 tablet, Rfl: 11    Review of Systems   Constitutional: Negative for chills, diaphoresis, fever, malaise/fatigue and night sweats.   HENT: Negative for congestion, hearing loss and nosebleeds.    Eyes: Negative for blurred vision and redness.   Cardiovascular: Positive for irregular heartbeat. Negative for chest pain, claudication, cyanosis, dyspnea on exertion, leg swelling, near-syncope,  "orthopnea, palpitations, paroxysmal nocturnal dyspnea and syncope.   Respiratory: Positive for shortness of breath. Negative for cough, hemoptysis and wheezing.    Endocrine: Positive for heat intolerance. Negative for polyuria.   Hematologic/Lymphatic: Negative for adenopathy. Does not bruise/bleed easily.   Skin: Negative for color change and rash.   Musculoskeletal: Negative for back pain and falls.   Gastrointestinal: Negative for abdominal pain, dysphagia, flatus, heartburn, hematemesis, jaundice, melena and nausea.   Genitourinary: Negative for dysuria, flank pain and frequency.   Neurological: Positive for loss of balance, numbness (FACIAL) and paresthesias. Negative for brief paralysis, dizziness, focal weakness, light-headedness and weakness.   Psychiatric/Behavioral: Negative for altered mental status (??) and depression.        Objective:      Vitals:    01/11/22 1330   BP: 131/79   Pulse: 68   Weight: 96.7 kg (213 lb 3 oz)   Height: 5' 8" (1.727 m)   PainSc:   2   PainLoc: Chest     Body mass index is 32.41 kg/m².    Physical Exam  Constitutional:       Appearance: She is obese.   HENT:      Head: Normocephalic and atraumatic.   Eyes:      General: No scleral icterus.     Extraocular Movements: Extraocular movements intact.   Neck:      Vascular: No carotid bruit.   Cardiovascular:      Rate and Rhythm: Normal rate and regular rhythm.      Pulses: Normal pulses.      Heart sounds: Murmur heard.    Holosystolic murmur is present with a grade of 1/6 at the apex.       Comments: PARADOXICAL SPLIT S2  Pulmonary:      Effort: Pulmonary effort is normal.      Breath sounds: Examination of the right-lower field reveals decreased breath sounds. Decreased breath sounds present. No rales.   Abdominal:      Palpations: Abdomen is soft. There is no hepatomegaly.      Tenderness: There is no abdominal tenderness.   Musculoskeletal:      Cervical back: Neck supple.      Right lower leg: No edema.      Left lower leg: " No edema.   Skin:     General: Skin is warm and dry.      Capillary Refill: Capillary refill takes less than 2 seconds.   Neurological:      General: No focal deficit present.      Mental Status: She is alert and oriented to person, place, and time.   Psychiatric:         Mood and Affect: Mood normal.         Speech: Speech normal.         Behavior: Behavior normal.                 ..    Chemistry        Component Value Date/Time     01/02/2022 1628    K 4.3 01/02/2022 1628     01/02/2022 1628    CO2 29 01/02/2022 1628    BUN 18 01/02/2022 1628    CREATININE 0.76 01/02/2022 1628     (H) 01/02/2022 1628        Component Value Date/Time    CALCIUM 9.8 01/02/2022 1628    ALKPHOS 143 01/02/2022 1628    AST 63 (H) 01/02/2022 1628     (H) 01/02/2022 1628    BILITOT 0.4 01/02/2022 1628    ESTGFRAFRICA >60 01/02/2022 1628    EGFRNONAA >60 01/02/2022 1628            ..  Lab Results   Component Value Date    CHOL 227 (H) 05/10/2021    CHOL 205 (H) 04/24/2021    CHOL 217 (H) 07/05/2017     Lab Results   Component Value Date    HDL 73 05/10/2021    HDL 63 04/24/2021    HDL 63 07/05/2017     Lab Results   Component Value Date    LDLCALC 128.2 05/10/2021    LDLCALC 100.2 04/24/2021    LDLCALC 128.6 07/05/2017     Lab Results   Component Value Date    TRIG 129 05/10/2021    TRIG 209 (H) 04/24/2021    TRIG 127 07/05/2017     Lab Results   Component Value Date    CHOLHDL 32.2 05/10/2021    CHOLHDL 30.7 04/24/2021    CHOLHDL 29.0 07/05/2017     ..  Lab Results   Component Value Date    WBC 7.36 01/02/2022    HGB 13.4 01/02/2022    HCT 41.6 01/02/2022    MCV 90 01/02/2022     01/02/2022       Test(s) Reviewed  I have reviewed the following in detail:  [] Stress test   [] Angiography   [] Echocardiogram   [x] Labs   [x] Other:       Assessment:         ICD-10-CM ICD-9-CM   1. Irregular heart beats  I49.9 427.9   2. Other fatigue  R53.83 780.79   3. Tobacco abuse  Z72.0 305.1   4. Mild CAD  I25.10 414.00    5. Obesity, Class I, BMI 30-34.9  E66.9 278.00   6. Memory loss  R41.3 780.93   7. Cerebral aneurysm  I67.1 437.3   8. Paroxysmal atrial fibrillation  I48.0 427.31   9. Facial numbness  R20.0 782.0     Problem List Items Addressed This Visit        Neuro    Cerebral aneurysm    Relevant Orders    Ambulatory referral/consult to Neurology    Memory loss    Relevant Orders    Ambulatory referral/consult to Neurology       Cardiac/Vascular    Paroxysmal atrial fibrillation    Relevant Orders    Ambulatory referral/consult to Electrophysiology    Mild CAD    Irregular heart beats - Primary    Relevant Orders    TSH (Completed)    Holter monitor - 48 hour       Endocrine    Obesity, Class I, BMI 30-34.9       Other    Tobacco abuse    Facial numbness    Relevant Orders    Ambulatory referral/consult to Neurology    Other fatigue           Plan:     EKG SR PAC;S, CHECK HOLTER, NEURO ASAP, DISCUSSED PLAN WITH THE PATIENT AND HER , CONSIDER LOOP, AND DC ELIQUIS IN THIS PATIENT HAS CEREBRAL ANEURYSM AND IF SHE BLEEDS WILL BE AT HIGH RISK SHE DID HAVE ABLATION TWICE WILL ALSO REFERRED TO ELECTROPHYSIOLOGY DR. AGUILLON, TOBACCO CESSATION COUNSELING NO ANGINA NO OVERT HEART FAILURE NO TIA TYPE SYMPTOMS ASSESS ARRHYTHMIA, RETURN CLINIC FEW WEEKS AFTER TEST.      Irregular heart beats  Comments:  ASSESS FURTHER  Orders:  -     TSH; Future; Expected date: 01/11/2022  -     Holter monitor - 48 hour; Future    Other fatigue  Comments:  EVALUATE    Tobacco abuse  Comments:  COUNSELING    Mild CAD    Obesity, Class I, BMI 30-34.9    Memory loss  Comments:  REFERRAL  Orders:  -     Ambulatory referral/consult to Neurology; Future; Expected date: 01/18/2022    Cerebral aneurysm  -     Ambulatory referral/consult to Neurology; Future; Expected date: 01/18/2022    Paroxysmal atrial fibrillation  -     Ambulatory referral/consult to Electrophysiology; Future; Expected date: 01/18/2022    Facial numbness  -     Ambulatory  referral/consult to Neurology; Future; Expected date: 01/18/2022    RTC Low level/low impact aerobic exercise 5x's/wk. Heart healthy diet and risk factor modification.    See labs and med orders.    Aerobic exercise 5x's/wk. Heart healthy diet and risk factor modification.    See labs and med orders.

## 2022-01-12 PROBLEM — R53.83 OTHER FATIGUE: Status: ACTIVE | Noted: 2022-01-12

## 2022-01-13 RX ORDER — TOPIRAMATE 50 MG/1
50 TABLET, FILM COATED ORAL 2 TIMES DAILY
Qty: 60 TABLET | Refills: 11 | Status: SHIPPED | OUTPATIENT
Start: 2022-01-13 | End: 2022-02-10

## 2022-01-18 ENCOUNTER — LAB VISIT (OUTPATIENT)
Dept: LAB | Facility: HOSPITAL | Age: 62
End: 2022-01-18
Attending: PSYCHIATRY & NEUROLOGY
Payer: COMMERCIAL

## 2022-01-18 ENCOUNTER — OFFICE VISIT (OUTPATIENT)
Dept: NEUROLOGY | Facility: CLINIC | Age: 62
End: 2022-01-18
Payer: COMMERCIAL

## 2022-01-18 VITALS
BODY MASS INDEX: 31.25 KG/M2 | HEIGHT: 69 IN | WEIGHT: 211 LBS | DIASTOLIC BLOOD PRESSURE: 78 MMHG | HEART RATE: 69 BPM | SYSTOLIC BLOOD PRESSURE: 128 MMHG

## 2022-01-18 DIAGNOSIS — R20.0 FACIAL NUMBNESS: ICD-10-CM

## 2022-01-18 DIAGNOSIS — R43.2 TASTE IMPAIRMENT: ICD-10-CM

## 2022-01-18 DIAGNOSIS — G62.9 POLYNEUROPATHY: ICD-10-CM

## 2022-01-18 DIAGNOSIS — I67.1 NONRUPTURED CEREBRAL ANEURYSM: ICD-10-CM

## 2022-01-18 DIAGNOSIS — G43.119 INTRACTABLE MIGRAINE WITH AURA WITHOUT STATUS MIGRAINOSUS: Primary | ICD-10-CM

## 2022-01-18 DIAGNOSIS — I67.1 CEREBRAL ANEURYSM: ICD-10-CM

## 2022-01-18 DIAGNOSIS — M79.18 CERVICAL MYOFASCIAL PAIN SYNDROME: ICD-10-CM

## 2022-01-18 DIAGNOSIS — F41.9 INSOMNIA SECONDARY TO ANXIETY: ICD-10-CM

## 2022-01-18 DIAGNOSIS — R41.3 MEMORY LOSS: ICD-10-CM

## 2022-01-18 DIAGNOSIS — F51.05 INSOMNIA SECONDARY TO ANXIETY: ICD-10-CM

## 2022-01-18 LAB
ESTIMATED AVG GLUCOSE: 105 MG/DL (ref 68–131)
HBA1C MFR BLD: 5.3 % (ref 4–5.6)
VIT B12 SERPL-MCNC: 545 PG/ML (ref 210–950)

## 2022-01-18 PROCEDURE — 3008F PR BODY MASS INDEX (BMI) DOCUMENTED: ICD-10-PCS | Mod: CPTII,S$GLB,, | Performed by: PSYCHIATRY & NEUROLOGY

## 2022-01-18 PROCEDURE — 99214 PR OFFICE/OUTPT VISIT, EST, LEVL IV, 30-39 MIN: ICD-10-PCS | Mod: S$GLB,,, | Performed by: PSYCHIATRY & NEUROLOGY

## 2022-01-18 PROCEDURE — 83921 ORGANIC ACID SINGLE QUANT: CPT | Performed by: PSYCHIATRY & NEUROLOGY

## 2022-01-18 PROCEDURE — 83036 HEMOGLOBIN GLYCOSYLATED A1C: CPT | Performed by: PSYCHIATRY & NEUROLOGY

## 2022-01-18 PROCEDURE — 3078F DIAST BP <80 MM HG: CPT | Mod: CPTII,S$GLB,, | Performed by: PSYCHIATRY & NEUROLOGY

## 2022-01-18 PROCEDURE — 99999 PR PBB SHADOW E&M-EST. PATIENT-LVL V: CPT | Mod: PBBFAC,,, | Performed by: PSYCHIATRY & NEUROLOGY

## 2022-01-18 PROCEDURE — 1159F PR MEDICATION LIST DOCUMENTED IN MEDICAL RECORD: ICD-10-PCS | Mod: CPTII,S$GLB,, | Performed by: PSYCHIATRY & NEUROLOGY

## 2022-01-18 PROCEDURE — 82607 VITAMIN B-12: CPT | Performed by: PSYCHIATRY & NEUROLOGY

## 2022-01-18 PROCEDURE — 1159F MED LIST DOCD IN RCRD: CPT | Mod: CPTII,S$GLB,, | Performed by: PSYCHIATRY & NEUROLOGY

## 2022-01-18 PROCEDURE — 3074F PR MOST RECENT SYSTOLIC BLOOD PRESSURE < 130 MM HG: ICD-10-PCS | Mod: CPTII,S$GLB,, | Performed by: PSYCHIATRY & NEUROLOGY

## 2022-01-18 PROCEDURE — 3078F PR MOST RECENT DIASTOLIC BLOOD PRESSURE < 80 MM HG: ICD-10-PCS | Mod: CPTII,S$GLB,, | Performed by: PSYCHIATRY & NEUROLOGY

## 2022-01-18 PROCEDURE — 3008F BODY MASS INDEX DOCD: CPT | Mod: CPTII,S$GLB,, | Performed by: PSYCHIATRY & NEUROLOGY

## 2022-01-18 PROCEDURE — 99214 OFFICE O/P EST MOD 30 MIN: CPT | Mod: S$GLB,,, | Performed by: PSYCHIATRY & NEUROLOGY

## 2022-01-18 PROCEDURE — 99999 PR PBB SHADOW E&M-EST. PATIENT-LVL V: ICD-10-PCS | Mod: PBBFAC,,, | Performed by: PSYCHIATRY & NEUROLOGY

## 2022-01-18 PROCEDURE — 3074F SYST BP LT 130 MM HG: CPT | Mod: CPTII,S$GLB,, | Performed by: PSYCHIATRY & NEUROLOGY

## 2022-01-18 RX ORDER — RIMEGEPANT SULFATE 75 MG/75MG
75 TABLET, ORALLY DISINTEGRATING ORAL ONCE AS NEEDED
Qty: 8 TABLET | Refills: 2 | Status: SHIPPED | OUTPATIENT
Start: 2022-01-18 | End: 2022-01-18

## 2022-01-18 RX ORDER — GABAPENTIN 300 MG/1
300 CAPSULE ORAL 3 TIMES DAILY
Qty: 90 CAPSULE | Refills: 11 | Status: ON HOLD | OUTPATIENT
Start: 2022-01-18 | End: 2024-03-22 | Stop reason: HOSPADM

## 2022-01-18 NOTE — PROGRESS NOTES
Outpatient Neurology Consultation    Requesting physician:  Tyler Rodriguez and Sweta    Impression:  1. Migraine with aura with post-traumatic exacerbation with myofascial pain  2. Cervical myofascial pain  3. Cognitive disturbance: exam suggests attention/concentration deficit; suspect exacerbation due to discontinuation of Adderall; insomnia/anxiety likely contributing  4. Possible sensory polyneuropathy: suspect borderline DM  5. 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 Tobacco abuse  7. PAF s/p ablation  8. Recent change in taste: suspect topiramate-related  9. Insomnia      Plan:  1. D/C topiramate  2. Trial of gabapentin 300mg bid; escalate to tid prn  3. Nurtec ODT prn  (avoid triptans (CAD))  4. Cervical PT  5. Smoking cessation encouraged  6. Cardiac monitoring as planned  7. Memory clinic  8. Repeat B12, HgA1c and check MMA  9. Trial of melatonin qhs  10. F/U with Interventional Clinic re aneurysm  11. Re-evaluate gait at f/u  12. RTC 3 months or sooner, prn      Problem List Items Addressed This Visit        1 - High    Intractable migraine with aura without status migrainosus - Primary    Relevant Orders    Ambulatory referral/consult to Physical/Occupational Therapy       2     Nonruptured cerebral aneurysm    Memory loss    Relevant Orders    Ambulatory consult to Neurology    Vitamin B12    METHYLMALONIC ACID, SERUM    Hemoglobin A1C       3     Insomnia secondary to anxiety       Unprioritized    Cerebral aneurysm    Facial numbness      Other Visit Diagnoses     Taste impairment        Cervical myofascial pain syndrome        Relevant Orders    Ambulatory referral/consult to Physical/Occupational Therapy    Polyneuropathy        Relevant Orders    Hemoglobin A1C          CC: multiple issues    HPI:  62 y/o WF presents with multiple complaints.  She is accompanied by her  who assists with the history.  She is having episodic L facial numbness followed by arm heaviness.  Followed by  "headache. + photo/phonophobia, + nausea.  Mother with migraine.  Abdominal pain as a child.   She fell a few weeks ago and has had worse headaches that are daily with associated neck stiffness.    She reports memory trouble x 6 months: forgetfulness (leaves water on, forgets to brush hair).  Poor sleep. + anxiety. ADHD.  Stopped Adderall recently since CAD. Symptoms worse since.  Tinnitus x few months  Few weeks of change in taste and laundry detergent smelled different.  Started topiramate before taste change  Gait trouble:  Occasional "foot crosses the other" since she stopped Adderall    She had an angio 6/7/21 and was supposed to see Dr. Gomez in f/u 2-3 weeks after    MRI 1/2/22 no acute changes; WM changes    Abortives    Prophylactics   Ineffective/not tolerated: topiramate    Past Medical History:   Diagnosis Date    ADHD (attention deficit hyperactivity disorder)     Anemia     Anticoagulant long-term use     Anxiety     Coronary artery disease     CAD    Elevated LFTs     elevated enzymes    GERD (gastroesophageal reflux disease) 4/24/2021    Hyperlipemia 4/24/2021    Hypertension     LBBB (left bundle branch block)     Lower extremity edema     Major depressive disorder, recurrent episode, mild 1/3/2022    Obesity     Paroxysmal A-fib     Skin cancer     skin ca      Past Surgical History:   Procedure Laterality Date    ABLATION  04/25/2021    to heart for a-fib x 2    CORONARY ANGIOGRAPHY N/A 5/6/2021    Procedure: ANGIOGRAM, CORONARY ARTERY;  Surgeon: Jesus Sol MD;  [facility];  Service: Cardiology;  Laterality: N/A;    LEFT HEART CATHETERIZATION N/A 5/6/2021    Procedure: Left heart cath;  Surgeon: Jesus Sol MD;  [facility];  Service: Cardiology;  Laterality: N/A;    TONSILLECTOMY      TUBAL LIGATION        Outpatient Medications Marked as Taking for the 1/18/22 encounter (Office Visit) with Raymond Clay MD   Medication Sig Dispense Refill    " aspirin (ECOTRIN) 81 MG EC tablet Take 1 tablet (81 mg total) by mouth once daily. (Patient taking differently: Take 81 mg by mouth 3 (three) times a week.) 90 tablet 1    bisoprolol (ZEBETA) 5 MG tablet Take 1 tablet (5 mg total) by mouth once daily. 30 tablet 1    flecainide (TAMBOCOR) 100 MG Tab Take 100 mg by mouth every 12 (twelve) hours.      multivitamin with minerals tablet Take 1 tablet by mouth once daily.      nitroGLYCERIN (NITROSTAT) 0.4 MG SL tablet Place 1 tablet (0.4 mg total) under the tongue every 5 (five) minutes as needed for Chest pain. 30 tablet 1    pantoprazole (PROTONIX) 40 MG tablet Take 1 tablet (40 mg total) by mouth once daily. 90 tablet 2    rosuvastatin (CRESTOR) 40 MG Tab Take 1 tablet (40 mg total) by mouth every evening. 90 tablet 3    topiramate (TOPAMAX) 25 MG tablet Take one tablet in pm for 1 week then 1 pill bid for 1 week then 1 pill in am and 2 pills qhs for 1 week then 2 pills bid 70 tablet 0      Review of patient's allergies indicates:   Allergen Reactions    Effexor [venlafaxine] Other (See Comments)     Bruising all over body and elevated blood pressure      Family History   Problem Relation Age of Onset    Diabetes Mother     Osteoporosis Maternal Grandmother     Pancreatic cancer Brother       Social History     Socioeconomic History    Marital status:    Occupational History    Occupation: [employer]   Tobacco Use    Smoking status: Current Every Day Smoker     Packs/day: 1.00     Years: 40.00     Pack years: 40.00     Types: Cigarettes    Smokeless tobacco: Never Used    Tobacco comment: down to 0.25 pack of  cigarettes per day   Substance and Sexual Activity    Alcohol use: Not Currently    Drug use: No     Review Of Systems:  General: Negative for fever   HENT: Negative for tinnitus, nose bleeds; + neck tightness and tinnitus   Cardiac Negative for palpitations   Vascular: Negative for easy bruising   Pulmonary:  "Negative for cough   Gastrointestinal: Negative for constipation   Urinary: Negative for incomplete bladder emptying   Musculoskeletal: Negative for muscle aches   Neurological: As above. Otherwise negative for abnormal movements   Psychiatric:  Anxiety/adhd     /78 (BP Location: Left arm, Patient Position: Sitting, BP Method: Large (Automatic))   Pulse 69   Ht 5' 8.5" (1.74 m)   Wt 95.7 kg (210 lb 15.7 oz)   LMP 10/28/2016 (Approximate)   BMI 31.61 kg/m²    Well developed, well nourished female  Extremities: no edema  Neck: decreased ROM all directions; TPs L>R traps  Mental status:   Awake, alert and appropriately oriented except "Feb"   Normal remote memory   Recalls 0/5 first attempt then 4/5 on 2nd attempt   Impaired attention and concentration (cannot spell WORLD backwards; trouble registering name/address initially)    Normal speech and language   Normal fund of knowledge   No extinction  Cranial nerves:   Normal funduscopic - discs sharp   PERRLA   EOMF without nystagmus   VFF   Normal facial sensation   Mild R lower facial weakness   Intact hearing bilaterally   Palate elevates symmetrically   Normal SCM and trapezius strength   Tongue midline  Motor:   No pronator drift   Normal FF movements bilaterally   Normal muscle tone, bulk and power   No abnormal movements  Sensory   Intact to LT, temperature, position   Glove-stocking decrease PP  DTRs   2+ and symmetric   Plantar responses are flexor bilaterally  Coordination   Intact to FNF, RAH, and HTS  Gait   Normal base and gait   Normal toe, heel and tandem   Equiv Romberg    Data Reviewed:  Lab Results   Component Value Date    [id number] 481 05/15/2021     Lab Results   Component Value Date    TSH 2.115 01/11/2022     Lab Results   Component Value Date    FOLATE 11.9 05/15/2021     Lab Results   Component Value Date    HGBA1C 5.7 (H) 05/10/2021       Lab Results   Component Value Date    WBC 7.36 01/02/2022    HGB 13.4 01/02/2022    HCT 41.6 " 01/02/2022    MCV 90 01/02/2022     01/02/2022       CMP  Sodium   Date Value Ref Range Status   01/02/2022 139 136 - 145 mmol/L Final     Potassium   Date Value Ref Range Status   01/02/2022 4.3 3.5 - 5.1 mmol/L Final     Chloride   Date Value Ref Range Status   01/02/2022 105 95 - 110 mmol/L Final     CO2   Date Value Ref Range Status   01/02/2022 29 22 - 31 mmol/L Final     Glucose   Date Value Ref Range Status   01/02/2022 136 (H) 70 - 110 mg/dL Final     Comment:     The ADA recommends the following guidelines for fasting glucose:    Normal:       less than 100 mg/dL    Prediabetes:  100 mg/dL to 125 mg/dL    Diabetes:     126 mg/dL or higher       BUN   Date Value Ref Range Status   01/02/2022 18 7 - 18 mg/dL Final     Creatinine   Date Value Ref Range Status   01/02/2022 0.76 0.50 - 1.40 mg/dL Final     Calcium   Date Value Ref Range Status   01/02/2022 9.8 8.4 - 10.2 mg/dL Final     Total Protein   Date Value Ref Range Status   01/02/2022 7.5 6.0 - 8.4 g/dL Final     Albumin   Date Value Ref Range Status   01/02/2022 4.1 3.5 - 5.2 g/dL Final     Total Bilirubin   Date Value Ref Range Status   01/02/2022 0.4 0.2 - 1.3 mg/dL Final     Alkaline Phosphatase   Date Value Ref Range Status   01/02/2022 143 38 - 145 U/L Final     AST   Date Value Ref Range Status   01/02/2022 63 (H) 14 - 36 U/L Final     ALT   Date Value Ref Range Status   01/02/2022 153 (H) 0 - 35 U/L Final     Anion Gap   Date Value Ref Range Status   01/02/2022 5 (L) 8 - 16 mmol/L Final     eGFR if    Date Value Ref Range Status   01/02/2022 >60 >60 mL/min/1.73 m^2 Final     eGFR if non    Date Value Ref Range Status   01/02/2022 >60 >60 mL/min/1.73 m^2 Final     Comment:     Calculation used to obtain the estimated glomerular filtration  rate (eGFR) is the CKD-EPI equation.            Raymond Clay MD

## 2022-01-19 ENCOUNTER — PATIENT MESSAGE (OUTPATIENT)
Dept: FAMILY MEDICINE | Facility: CLINIC | Age: 62
End: 2022-01-19
Payer: COMMERCIAL

## 2022-01-19 ENCOUNTER — TELEPHONE (OUTPATIENT)
Dept: CARDIOLOGY | Facility: CLINIC | Age: 62
End: 2022-01-19
Payer: COMMERCIAL

## 2022-01-19 NOTE — TELEPHONE ENCOUNTER
----- Message from Jesus Sol MD sent at 1/18/2022  6:46 PM CST -----  ELEVATED TSH INDICATIVE OF HYPOTHYROIDISM FOLLOW-UP WITH PCP

## 2022-01-19 NOTE — TELEPHONE ENCOUNTER
"Spoke to pt over the phone inform her "ELEVATED TSH INDICATIVE OF HYPOTHYROIDISM FOLLOW-UP WITH PCP ", per Dr Sol. Pt vu     "

## 2022-01-20 ENCOUNTER — DOCUMENTATION ONLY (OUTPATIENT)
Dept: NEUROLOGY | Facility: CLINIC | Age: 62
End: 2022-01-20
Payer: COMMERCIAL

## 2022-01-22 LAB — METHYLMALONATE SERPL-SCNC: 0.29 UMOL/L

## 2022-01-24 ENCOUNTER — HOSPITAL ENCOUNTER (OUTPATIENT)
Dept: RADIOLOGY | Facility: HOSPITAL | Age: 62
Discharge: HOME | End: 2022-01-24
Attending: INTERNAL MEDICINE
Payer: COMMERCIAL

## 2022-01-24 DIAGNOSIS — Z12.31 ENCOUNTER FOR SCREENING MAMMOGRAM FOR MALIGNANT NEOPLASM OF BREAST: ICD-10-CM

## 2022-01-24 PROCEDURE — 77067 SCR MAMMO BI INCL CAD: CPT | Mod: TC,PO

## 2022-01-24 PROCEDURE — 77063 MAMMO DIGITAL SCREENING BILAT WITH TOMO: ICD-10-PCS | Mod: 26,,, | Performed by: RADIOLOGY

## 2022-01-24 PROCEDURE — 77067 SCR MAMMO BI INCL CAD: CPT | Mod: 26,,, | Performed by: RADIOLOGY

## 2022-01-24 PROCEDURE — 77067 MAMMO DIGITAL SCREENING BILAT WITH TOMO: ICD-10-PCS | Mod: 26,,, | Performed by: RADIOLOGY

## 2022-01-24 PROCEDURE — 77063 BREAST TOMOSYNTHESIS BI: CPT | Mod: 26,,, | Performed by: RADIOLOGY

## 2022-01-28 ENCOUNTER — CLINICAL SUPPORT (OUTPATIENT)
Dept: CARDIOLOGY | Facility: HOSPITAL | Age: 62
End: 2022-01-28
Attending: INTERNAL MEDICINE
Payer: COMMERCIAL

## 2022-01-28 DIAGNOSIS — I49.9 IRREGULAR HEART BEATS: ICD-10-CM

## 2022-01-28 PROCEDURE — 93225 XTRNL ECG REC<48 HRS REC: CPT | Mod: PO

## 2022-01-28 PROCEDURE — 93227 HOLTER MONITOR - 48 HOUR (CUPID ONLY): ICD-10-PCS | Mod: ,,, | Performed by: INTERNAL MEDICINE

## 2022-01-28 PROCEDURE — 93227 XTRNL ECG REC<48 HR R&I: CPT | Mod: ,,, | Performed by: INTERNAL MEDICINE

## 2022-01-31 ENCOUNTER — OFFICE VISIT (OUTPATIENT)
Dept: ORTHOPEDICS | Facility: CLINIC | Age: 62
End: 2022-01-31
Payer: COMMERCIAL

## 2022-01-31 ENCOUNTER — HOSPITAL ENCOUNTER (OUTPATIENT)
Dept: RADIOLOGY | Facility: HOSPITAL | Age: 62
Discharge: HOME | End: 2022-01-31
Attending: ORTHOPAEDIC SURGERY
Payer: COMMERCIAL

## 2022-01-31 VITALS
HEIGHT: 69 IN | BODY MASS INDEX: 31.1 KG/M2 | DIASTOLIC BLOOD PRESSURE: 73 MMHG | WEIGHT: 210 LBS | HEART RATE: 75 BPM | SYSTOLIC BLOOD PRESSURE: 120 MMHG

## 2022-01-31 DIAGNOSIS — M65.4 DE QUERVAIN'S TENOSYNOVITIS, RIGHT: Primary | ICD-10-CM

## 2022-01-31 DIAGNOSIS — M18.11 ARTHRITIS OF CARPOMETACARPAL (CMC) JOINT OF RIGHT THUMB: ICD-10-CM

## 2022-01-31 DIAGNOSIS — M65.4 DE QUERVAIN'S TENOSYNOVITIS, RIGHT: ICD-10-CM

## 2022-01-31 PROCEDURE — 3008F BODY MASS INDEX DOCD: CPT | Mod: CPTII,S$GLB,, | Performed by: ORTHOPAEDIC SURGERY

## 2022-01-31 PROCEDURE — 73110 X-RAY EXAM OF WRIST: CPT | Mod: TC,PO,RT

## 2022-01-31 PROCEDURE — 99203 OFFICE O/P NEW LOW 30 MIN: CPT | Mod: S$GLB,,, | Performed by: ORTHOPAEDIC SURGERY

## 2022-01-31 PROCEDURE — 99999 PR PBB SHADOW E&M-EST. PATIENT-LVL IV: ICD-10-PCS | Mod: PBBFAC,,, | Performed by: ORTHOPAEDIC SURGERY

## 2022-01-31 PROCEDURE — 3074F PR MOST RECENT SYSTOLIC BLOOD PRESSURE < 130 MM HG: ICD-10-PCS | Mod: CPTII,S$GLB,, | Performed by: ORTHOPAEDIC SURGERY

## 2022-01-31 PROCEDURE — 3044F PR MOST RECENT HEMOGLOBIN A1C LEVEL <7.0%: ICD-10-PCS | Mod: CPTII,S$GLB,, | Performed by: ORTHOPAEDIC SURGERY

## 2022-01-31 PROCEDURE — 3078F PR MOST RECENT DIASTOLIC BLOOD PRESSURE < 80 MM HG: ICD-10-PCS | Mod: CPTII,S$GLB,, | Performed by: ORTHOPAEDIC SURGERY

## 2022-01-31 PROCEDURE — 3044F HG A1C LEVEL LT 7.0%: CPT | Mod: CPTII,S$GLB,, | Performed by: ORTHOPAEDIC SURGERY

## 2022-01-31 PROCEDURE — 3008F PR BODY MASS INDEX (BMI) DOCUMENTED: ICD-10-PCS | Mod: CPTII,S$GLB,, | Performed by: ORTHOPAEDIC SURGERY

## 2022-01-31 PROCEDURE — 73110 XR WRIST COMPLETE 3 VIEWS RIGHT: ICD-10-PCS | Mod: 26,RT,, | Performed by: RADIOLOGY

## 2022-01-31 PROCEDURE — 1159F MED LIST DOCD IN RCRD: CPT | Mod: CPTII,S$GLB,, | Performed by: ORTHOPAEDIC SURGERY

## 2022-01-31 PROCEDURE — 3074F SYST BP LT 130 MM HG: CPT | Mod: CPTII,S$GLB,, | Performed by: ORTHOPAEDIC SURGERY

## 2022-01-31 PROCEDURE — 99203 PR OFFICE/OUTPT VISIT, NEW, LEVL III, 30-44 MIN: ICD-10-PCS | Mod: S$GLB,,, | Performed by: ORTHOPAEDIC SURGERY

## 2022-01-31 PROCEDURE — 99999 PR PBB SHADOW E&M-EST. PATIENT-LVL IV: CPT | Mod: PBBFAC,,, | Performed by: ORTHOPAEDIC SURGERY

## 2022-01-31 PROCEDURE — 1159F PR MEDICATION LIST DOCUMENTED IN MEDICAL RECORD: ICD-10-PCS | Mod: CPTII,S$GLB,, | Performed by: ORTHOPAEDIC SURGERY

## 2022-01-31 PROCEDURE — 3078F DIAST BP <80 MM HG: CPT | Mod: CPTII,S$GLB,, | Performed by: ORTHOPAEDIC SURGERY

## 2022-01-31 PROCEDURE — 73110 X-RAY EXAM OF WRIST: CPT | Mod: 26,RT,, | Performed by: RADIOLOGY

## 2022-01-31 RX ORDER — RIMEGEPANT SULFATE 75 MG/75MG
TABLET, ORALLY DISINTEGRATING ORAL
COMMUNITY
Start: 2022-01-21 | End: 2022-02-10

## 2022-01-31 NOTE — PROGRESS NOTES
1/31/2022    Chief Complaint:  Chief Complaint   Patient presents with    Right Hand - Pain, Swelling     R THUMB       HPI:  Tanya Espinoza is a 61 y.o. female, who presents to clinic today history of right thumb CMC arthritis but she is having a new pain to the right wrist.  She states that it has been going on for 2-3 months.  States that she recently had some skin lesions removed and has stitches in her upper arm.  She has no other complaints.    PMHX:  Past Medical History:   Diagnosis Date    ADHD (attention deficit hyperactivity disorder)     Anemia     Anticoagulant long-term use     Anxiety     Coronary artery disease     CAD    Elevated LFTs     elevated enzymes    GERD (gastroesophageal reflux disease) 4/24/2021    Hyperlipemia 4/24/2021    Hypertension     LBBB (left bundle branch block)     Lower extremity edema     Major depressive disorder, recurrent episode, mild 1/3/2022    Obesity     Paroxysmal A-fib     Skin cancer     skin ca       PSHX:  Past Surgical History:   Procedure Laterality Date    ABLATION  04/25/2021    to heart for a-fib x 2    CORONARY ANGIOGRAPHY N/A 5/6/2021    Procedure: ANGIOGRAM, CORONARY ARTERY;  Surgeon: Jesus Sol MD;  [facility];  Service: Cardiology;  Laterality: N/A;    LEFT HEART CATHETERIZATION N/A 5/6/2021    Procedure: Left heart cath;  Surgeon: Jesus Sol MD;  [facility];  Service: Cardiology;  Laterality: N/A;    TONSILLECTOMY      TUBAL LIGATION         FMHX:  Family History   Problem Relation Age of Onset    Diabetes Mother     Osteoporosis Maternal Grandmother     Pancreatic cancer Brother        SOCHX:  Social History     Tobacco Use    Smoking status: Current Every Day Smoker     Packs/day: 1.00     Years: 40.00     Pack years: 40.00     Types: Cigarettes    Smokeless tobacco: Never Used    Tobacco comment: down to 0.25 pack of  cigarettes per day   Substance Use Topics    Alcohol use: Not Currently        ALLERGIES:  Effexor [venlafaxine]    CURRENT MEDICATIONS:  Current Outpatient Medications on File Prior to Visit   Medication Sig Dispense Refill    apixaban (ELIQUIS) 5 mg Tab Take 5 mg by mouth 2 (two) times a day.      aspirin (ECOTRIN) 81 MG EC tablet Take 1 tablet (81 mg total) by mouth once daily. (Patient taking differently: Take 81 mg by mouth 3 (three) times a week.) 90 tablet 1    bisoprolol (ZEBETA) 5 MG tablet Take 1 tablet (5 mg total) by mouth once daily. 30 tablet 1    flecainide (TAMBOCOR) 100 MG Tab Take 100 mg by mouth every 12 (twelve) hours.      gabapentin (NEURONTIN) 300 MG capsule Take 1 capsule (300 mg total) by mouth 3 (three) times daily. 90 capsule 11    multivitamin with minerals tablet Take 1 tablet by mouth once daily.      nitroGLYCERIN (NITROSTAT) 0.4 MG SL tablet Place 1 tablet (0.4 mg total) under the tongue every 5 (five) minutes as needed for Chest pain. 30 tablet 1    NURTEC 75 mg odt dissolve 1 tablet sublingually daily as needed for migraine      pantoprazole (PROTONIX) 40 MG tablet Take 1 tablet (40 mg total) by mouth once daily. 90 tablet 2    rosuvastatin (CRESTOR) 40 MG Tab Take 1 tablet (40 mg total) by mouth every evening. 90 tablet 3    topiramate (TOPAMAX) 25 MG tablet Take one tablet in pm for 1 week then 1 pill bid for 1 week then 1 pill in am and 2 pills qhs for 1 week then 2 pills bid 70 tablet 0    topiramate (TOPAMAX) 50 MG tablet Take 1 tablet (50 mg total) by mouth 2 (two) times daily. 60 tablet 11     No current facility-administered medications on file prior to visit.       REVIEW OF SYSTEMS:  Review of Systems   Constitutional: Negative.    HENT: Negative.    Eyes: Negative.    Respiratory: Positive for shortness of breath.    Cardiovascular: Negative.    Gastrointestinal: Negative.    Genitourinary: Negative.    Musculoskeletal: Positive for neck pain.   Skin: Negative.    Neurological: Negative.    Endo/Heme/Allergies: Bruises/bleeds  "easily.   Psychiatric/Behavioral: Positive for memory loss. The patient has insomnia.        GENERAL PHYSICAL EXAM:   /73 (BP Location: Left arm, Patient Position: Sitting)   Pulse 75   Ht 5' 8.5" (1.74 m)   Wt 95.3 kg (210 lb)   LMP 10/28/2016 (Approximate)   BMI 31.47 kg/m²    GEN: well developed, well nourished, no acute distress   HENT: Normocephalic, atraumatic   EYES: No discharge, conjunctiva normal   NECK: Supple, non-tender   PULM: No wheezing, no respiratory distress   CV: RRR   ABD: Soft, non-tender    ORTHO EXAM:   Examination of the right hand and wrist reveals that there are no major skin changes.  There is mild to moderate edema over the 1st extensor compartment.  Palpation at that site does produce significant tenderness.  She has a mildly positive Finkelstein's test.  She has no other areas of tenderness.  She is not complaining of pain at the CMC joint today.  She is neurovascularly intact.    RADIOLOGY:   X-rays of the right wrist were taken in clinic today.  The films reviewed by me.  There are no fractures or dislocations noted.  There are mild degenerative changes about the CMC joint of the thumb.    ASSESSMENT:   Right wrist de Quervain tenosynovitis    PLAN:  1. At this point the patient has recently had a surgical procedure in still has sutures in place and therefore I do not want to place an injection to the right wrist.  She is also unable to take anti-inflammatories due to chronic anticoagulation therapy    2. The patient will follow up with me in 2-3 weeks which point I will consider injecting her right wrist 1st extensor compartment      "

## 2022-02-01 LAB
OHS CV EVENT MONITOR DAY: 0
OHS CV HOLTER LENGTH DECIMAL HOURS: 48
OHS CV HOLTER LENGTH HOURS: 48
OHS CV HOLTER LENGTH MINUTES: 0
OHS CV HOLTER SINUS AVERAGE HR: 73
OHS CV HOLTER SINUS MAX HR: 104
OHS CV HOLTER SINUS MIN HR: 57

## 2022-02-06 ENCOUNTER — OUTPATIENT CASE MANAGEMENT (OUTPATIENT)
Dept: NEUROLOGY | Facility: CLINIC | Age: 62
End: 2022-02-06
Payer: COMMERCIAL

## 2022-02-06 DIAGNOSIS — R46.89 COGNITIVE AND BEHAVIORAL CHANGES: ICD-10-CM

## 2022-02-06 DIAGNOSIS — R41.89 COGNITIVE AND BEHAVIORAL CHANGES: ICD-10-CM

## 2022-02-06 DIAGNOSIS — F90.9 ATTENTION DEFICIT HYPERACTIVITY DISORDER (ADHD), UNSPECIFIED ADHD TYPE: ICD-10-CM

## 2022-02-06 PROCEDURE — 99358 PROLONG SERVICE W/O CONTACT: CPT | Mod: S$GLB,,, | Performed by: PSYCHIATRY & NEUROLOGY

## 2022-02-06 PROCEDURE — 99358 PR PROLONGED SERV,NO CONTACT,1ST HR: ICD-10-PCS | Mod: S$GLB,,, | Performed by: PSYCHIATRY & NEUROLOGY

## 2022-02-06 NOTE — PROGRESS NOTES
[facility]  Brain Health and Cognitive [facility]    PATIENT: Tanya Espinoza  DATE: 02/06/2022  MRN: [id number]  PRIMARY PROVIDER: Wendy Rodriguez DO    Future Appointments   Date Time Provider Department Center   2/8/2022  8:00 AM Janelle Adames PTA [facility] [place]   2/10/2022  2:15 PM Jt Francisco MD [facility] NEURO[address] [place] [address]   2/11/2022  9:00 AM Janelle Adames PTA [facility] [place]   2/14/2022 11:20 AM Hai Alaniz MD [facility] ORTHO [place]   2/14/2022  2:40 PM Vasquez Granger MD [facility] ARRHYTH [place]   2/15/2022  2:00 PM Makenna Menjivar PT [facility] [place]   2/22/2022  9:00 AM Wendy Rodriguez DO [facility] FAM MED [place]   7/6/2022 10:10 AM [facility] LAB [place]   7/11/2022  9:40 AM Wendy Rodriguez DO [facility] [place]   7/18/2022  9:45 AM Jesus Sol MD [facility] CARDIO [place]     I reviewed old records and/or communicated with other professionals or the patient's family from 04:15 PM until 04:55 PM on 02/06/2022. This is directly related to a face-to-face visit encounter with the patient (Evaluation and Management service) conducted on 02/10/2022.    I reviewed the following documentation for a total of 40 minutes.  CPT codes for billing for prolonged evaluation and management service (non-face-to-face review of records or communications with patient's family or other medical professionals):   [address]  [facility] EMR records I reviewed include:     Relevant Background/Context   Known Relevant Genetics:  o There is no known relevant genetic testing available.   Known Relevant Family history:  o No Known Relevant Family History.  o The family denies a history of early/late onset cognitive impairment.  o The family denies a history of movement disorder (PD, PDD, tremor, etc).  o The family denies a history of motor neuron disease (ALS).  o The family denies a history of developmental learning disorder (Dyslexia, ADHD, ASD, etc.).  o The family denies a history  of mood/substance abuse disorder (MDD, GUANAKITO, Schizophrenia, etc.).   Developmental/Milestones:  o The patient/family report no known birth complications or early life problems. The patient met all developmental milestones.   Learning Disorders:  o The patient/family report no signs or symptoms suggestive of developmental learning disorder.   Education:  o 12 years of formal education.  o HS.   Social History:  o She is a smoker and we addressed smoking cessation today.   Relevant Medical History:  o ADHD (attention deficit hyperactivity disorder)  o Anticoagulant long-term use  o Anxiety  o Coronary artery disease  o Elevated LFTs  o GERD (gastroesophageal reflux disease) 4/24/2021  o Hyperlipemia 4/24/2021  o Hypertension  o LBBB (left bundle branch block)  o Major depressive disorder, recurrent episode, mild 1/3/2022  o Obesity  o Paroxysmal A-fib   Relevant Exposure/Trauma to CNS:  o No History of Traumatic Brain Injury or Concussions  o No History of Chronic Mood Disorder  o No History of Chronic Stress  o No History of Chronic Substance Abuse  o No History of Malnutrition  o No History of Toxic Exposure     Neurocognitive Disorder Features   Onset/Duration:  o Unknown   First Symptom:  o Memory impairment   Progression:  o Gradually Progressive   Clinical Course:  o Neurologist (01/18/2022)  - Type: Chart Review. Presents with multiple complaints. She is accompanied by her  who assists with the history. She is having episodic L facial numbness followed by arm heaviness. Followed by headache. + photo/phonophobia, + nausea. Mother with migraine. Abdominal pain as a child. She fell a few weeks ago and has had worse headaches that are daily with associated neck stiffness. She reports memory trouble x 6 months: forgetfulness (leaves water on, forgets to brush hair). Poor sleep. + anxiety. ADHD. Stopped Adderall recently since CAD. Symptoms worse since. Tinnitus x few months. Few weeks of change in  "taste and laundry detergent smelled different. Started topiramate before taste change. Gait trouble: Occasional "foot crosses the other" since she stopped Adderall. 1. Cognitive disturbance: exam suggests attention/concentration deficit; suspect exacerbation due to discontinuation of Adderall; insomnia/anxiety likely contributing.  o Neurologist (01/11/2022)  - Type: Chart Review. The patient STATES THAT SHE HAS BEEN HAVING RACING HEART ,S/P FALL,FACIAL PARESTHESIA, HUSBANDS STATES GETS CONFUSED, ANEUERYSM 3. 5 CM, AGGRAVATED WITH NO APPT WITH NEURO AT [facility],STILL SMOKES,SAW NEURORADIOLOGY NO COILING, LAST RFA PER DR COLE 6 MO AGO, SEE ROS.  o Psychiatrist (03/05/2013)  - Type: Chart Review. She has got a. history of adult ADHD and depression. She says she has been off of her. medications now for 13 years. 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 inattention and unable to finish task.  o Psychiatrist (07/05/2017)  - Type: Chart Review. She has ADD dx 20 years ago. She was on treatment for a while when dx then came off of treatment for many years. Four years ago she restarted. She was originally dx through psychiatry. She says before treatment she was very distractible and would often forget to pay bills. She would procrastinate and had trouble with her memory because her mind would go so fast. She often missed appointments and did not finish work. It was interfering in her job. She currently works to [employer] and [employer]. This is physical type of work. She works all hours of the day and usually 6 days a week. She takes her long acting adderrall at 6:30 am and then her short acting dose of 11 am. She takes everyday day. She denies any side effects and adds that it has helped her to be so much more productive and organized. No evidence of abuse by . Her last fill was 6/15/17 #30 on both adderrall XR and adderall " short acting. Rx given today with stop date of 8/13/17. Good control on her reigmen and has been taking for the last 4 years. Will refer to [facility] to manage. No side effects.     Current Presentation   Recent/Interim History:  o Unknown   Unresolved Concern(s) reported by patient/family:  o PAF on stimulants          Neuroimaging:    MRI brain/head without contrast on 1/2/22   Formal interpretation by Radiology:   Normal parenchymal volume. Stable scattered T2 FLAIR hyperintense white matter foci are present, likely related to chronic microvascular ischemic change. No parenchymal restricted diffusion. No evidence of intracranial hemorrhage. No extra-axial fluid collection or mass. No intracranial mass effect. No hydrocephalus. Midline structures have a normal configuration. Visualized pituitary gland and infundibulum are normal. Visualized major intracranial vascular structures demonstrate normal flow voids and are normal in course and caliber.   Independently reviewed radiological imaging by Jt Jain MD. MPH. Behavioral Neurologist   T1: Largely normal brain slight widening of the marginal sulci in the right hemisphere. Cannot comment on hippocampi as no coronal views available. Slight thinning of the posterior cingulate. Otherwise largely normal brain.   T2/FLAIR: Subtle white matter hyperintensity left pontine region with wispy nature possible wallerian degeneration. Scattered high dorsal punctuated subcortical white matter changes with both the large somewhat oval punctuated lesions in the left subcortical hemisphere and mild intermittent hyperintensities in the right Zeng in turn a watershed territories likely suggestive of uncontrolled atherosclerotic risk factors.   DWI/ADC: No Significant DWI hyperintensities/hypointensities. No ADC correlation.   SWI/GRE: No Significant hypointensities to suggest cortical/subcortical hemosiderin deposition.   Impression: : Largely normal brain  with slight sulcal widening of the right marginal sulcus. Scattered subcortical white matter disease most well distributed in the internal watershed territories left greater than right consistent with atherosclerotic microvascular disease.     Working Diagnosis/Differential   VCID/ADHD NCD     Sincerely,  Jt Francisco MD. MPH.    Brain Health and Cognitive Disorders Program  [facility]

## 2022-02-10 ENCOUNTER — HOSPITAL ENCOUNTER (OUTPATIENT)
Dept: RADIOLOGY | Facility: HOSPITAL | Age: 62
Discharge: HOME | End: 2022-02-10
Attending: PSYCHIATRY & NEUROLOGY
Payer: COMMERCIAL

## 2022-02-10 ENCOUNTER — OFFICE VISIT (OUTPATIENT)
Dept: NEUROLOGY | Facility: CLINIC | Age: 62
End: 2022-02-10
Payer: COMMERCIAL

## 2022-02-10 VITALS
DIASTOLIC BLOOD PRESSURE: 75 MMHG | HEART RATE: 64 BPM | BODY MASS INDEX: 32.55 KG/M2 | WEIGHT: 217.25 LBS | SYSTOLIC BLOOD PRESSURE: 116 MMHG

## 2022-02-10 DIAGNOSIS — G45.9 TRANSIENT CEREBRAL ISCHEMIA, UNSPECIFIED TYPE: ICD-10-CM

## 2022-02-10 DIAGNOSIS — R26.81 GAIT INSTABILITY: ICD-10-CM

## 2022-02-10 DIAGNOSIS — R43.2 DYSGEUSIA: ICD-10-CM

## 2022-02-10 DIAGNOSIS — R42 DIZZINESS AND GIDDINESS: ICD-10-CM

## 2022-02-10 DIAGNOSIS — G31.84 MCI (MILD COGNITIVE IMPAIRMENT): Primary | ICD-10-CM

## 2022-02-10 DIAGNOSIS — G47.00 INSOMNIA, UNSPECIFIED TYPE: ICD-10-CM

## 2022-02-10 DIAGNOSIS — F07.81 POST CONCUSSIVE ENCEPHALOPATHY: ICD-10-CM

## 2022-02-10 DIAGNOSIS — U09.9 LONG COVID: ICD-10-CM

## 2022-02-10 DIAGNOSIS — S06.9X9S TRAUMATIC BRAIN INJURY WITH LOSS OF CONSCIOUSNESS, SEQUELA: ICD-10-CM

## 2022-02-10 DIAGNOSIS — F90.9 ATTENTION DEFICIT HYPERACTIVITY DISORDER (ADHD), UNSPECIFIED ADHD TYPE: ICD-10-CM

## 2022-02-10 DIAGNOSIS — R41.3 MEMORY LOSS: ICD-10-CM

## 2022-02-10 PROCEDURE — 99215 PR OFFICE/OUTPT VISIT, EST, LEVL V, 40-54 MIN: ICD-10-PCS | Mod: S$GLB,,, | Performed by: PSYCHIATRY & NEUROLOGY

## 2022-02-10 PROCEDURE — 3008F BODY MASS INDEX DOCD: CPT | Mod: CPTII,S$GLB,, | Performed by: PSYCHIATRY & NEUROLOGY

## 2022-02-10 PROCEDURE — 3074F PR MOST RECENT SYSTOLIC BLOOD PRESSURE < 130 MM HG: ICD-10-PCS | Mod: CPTII,S$GLB,, | Performed by: PSYCHIATRY & NEUROLOGY

## 2022-02-10 PROCEDURE — 3044F HG A1C LEVEL LT 7.0%: CPT | Mod: CPTII,S$GLB,, | Performed by: PSYCHIATRY & NEUROLOGY

## 2022-02-10 PROCEDURE — 99999 PR PBB SHADOW E&M-EST. PATIENT-LVL IV: ICD-10-PCS | Mod: PBBFAC,,, | Performed by: PSYCHIATRY & NEUROLOGY

## 2022-02-10 PROCEDURE — 96116 PR NEUROBEHAVIORAL STATUS EXAM BY PSYCH/PHYS: ICD-10-PCS | Mod: 59,S$GLB,, | Performed by: PSYCHIATRY & NEUROLOGY

## 2022-02-10 PROCEDURE — 96132 NRPSYC TST EVAL PHYS/QHP 1ST: CPT | Mod: 59,S$GLB,, | Performed by: PSYCHIATRY & NEUROLOGY

## 2022-02-10 PROCEDURE — 1159F MED LIST DOCD IN RCRD: CPT | Mod: CPTII,S$GLB,, | Performed by: PSYCHIATRY & NEUROLOGY

## 2022-02-10 PROCEDURE — 96132 PR NEUROPSYCHOLOGIC TEST EVAL SVCS, 1ST HR: ICD-10-PCS | Mod: 59,S$GLB,, | Performed by: PSYCHIATRY & NEUROLOGY

## 2022-02-10 PROCEDURE — 1160F RVW MEDS BY RX/DR IN RCRD: CPT | Mod: CPTII,S$GLB,, | Performed by: PSYCHIATRY & NEUROLOGY

## 2022-02-10 PROCEDURE — 1159F PR MEDICATION LIST DOCUMENTED IN MEDICAL RECORD: ICD-10-PCS | Mod: CPTII,S$GLB,, | Performed by: PSYCHIATRY & NEUROLOGY

## 2022-02-10 PROCEDURE — 70553 MRI BRAIN STEM W/O & W/DYE: CPT | Mod: 26,,, | Performed by: RADIOLOGY

## 2022-02-10 PROCEDURE — A9585 GADOBUTROL INJECTION: HCPCS | Performed by: PSYCHIATRY & NEUROLOGY

## 2022-02-10 PROCEDURE — 99417 PROLNG OP E/M EACH 15 MIN: CPT | Mod: S$GLB,,, | Performed by: PSYCHIATRY & NEUROLOGY

## 2022-02-10 PROCEDURE — 3044F PR MOST RECENT HEMOGLOBIN A1C LEVEL <7.0%: ICD-10-PCS | Mod: CPTII,S$GLB,, | Performed by: PSYCHIATRY & NEUROLOGY

## 2022-02-10 PROCEDURE — 25500020 PHARM REV CODE 255: Performed by: PSYCHIATRY & NEUROLOGY

## 2022-02-10 PROCEDURE — 99417 PR PROLONGED SVC, OUTPT, W/WO DIRECT PT CONTACT,  EA ADDTL 15 MIN: ICD-10-PCS | Mod: S$GLB,,, | Performed by: PSYCHIATRY & NEUROLOGY

## 2022-02-10 PROCEDURE — 3074F SYST BP LT 130 MM HG: CPT | Mod: CPTII,S$GLB,, | Performed by: PSYCHIATRY & NEUROLOGY

## 2022-02-10 PROCEDURE — 1160F PR REVIEW ALL MEDS BY PRESCRIBER/CLIN PHARMACIST DOCUMENTED: ICD-10-PCS | Mod: CPTII,S$GLB,, | Performed by: PSYCHIATRY & NEUROLOGY

## 2022-02-10 PROCEDURE — 3008F PR BODY MASS INDEX (BMI) DOCUMENTED: ICD-10-PCS | Mod: CPTII,S$GLB,, | Performed by: PSYCHIATRY & NEUROLOGY

## 2022-02-10 PROCEDURE — 96116 NUBHVL XM PHYS/QHP 1ST HR: CPT | Mod: 59,S$GLB,, | Performed by: PSYCHIATRY & NEUROLOGY

## 2022-02-10 PROCEDURE — 99215 OFFICE O/P EST HI 40 MIN: CPT | Mod: S$GLB,,, | Performed by: PSYCHIATRY & NEUROLOGY

## 2022-02-10 PROCEDURE — 99999 PR PBB SHADOW E&M-EST. PATIENT-LVL IV: CPT | Mod: PBBFAC,,, | Performed by: PSYCHIATRY & NEUROLOGY

## 2022-02-10 PROCEDURE — 70553 MRI BRAIN STEM W/O & W/DYE: CPT | Mod: TC

## 2022-02-10 PROCEDURE — 70553 MRI BRAIN W WO CONTRAST: ICD-10-PCS | Mod: 26,,, | Performed by: RADIOLOGY

## 2022-02-10 PROCEDURE — 3078F PR MOST RECENT DIASTOLIC BLOOD PRESSURE < 80 MM HG: ICD-10-PCS | Mod: CPTII,S$GLB,, | Performed by: PSYCHIATRY & NEUROLOGY

## 2022-02-10 PROCEDURE — 3078F DIAST BP <80 MM HG: CPT | Mod: CPTII,S$GLB,, | Performed by: PSYCHIATRY & NEUROLOGY

## 2022-02-10 RX ORDER — RAMELTEON 8 MG/1
8 TABLET ORAL NIGHTLY
Qty: 30 TABLET | Refills: 3 | Status: ON HOLD | OUTPATIENT
Start: 2022-02-10 | End: 2024-03-22 | Stop reason: HOSPADM

## 2022-02-10 RX ORDER — GADOBUTROL 604.72 MG/ML
10 INJECTION INTRAVENOUS
Status: COMPLETED | OUTPATIENT
Start: 2022-02-10 | End: 2022-02-10

## 2022-02-10 RX ADMIN — GADOBUTROL 10 ML: 604.72 INJECTION INTRAVENOUS at 06:02

## 2022-02-10 NOTE — PROGRESS NOTES
[facility]  [facility]     PATIENT: Tanya Espinoza  VISIT DATE: 02/10/2022  MRN: [id number]  PRIMARY PROVIDER: Wendy Rodriguez DO  : 1960       Chief complaint: Progressive Cognitive Impairment     History of present illness:      Ms. Espinoza is a 61-year-old right-handed female who presents today to the [facility]'s [facility] due to concerns related to progressive neurocognitive impairment.    Ms. Espinoza is accompanied by the  who participates in providing history.   Additional information is obtained by reviewing available medical records.     Relevant Background/Context   Known Relevant Genetics:  o There is no known relevant genetic testing available.   Known Relevant Family history:  o No Known Relevant Family History.  o The family denies a history of early/late onset cognitive impairment.  o The family denies a history of movement disorder (PD, PDD, tremor, etc).  o The family denies a history of motor neuron disease (ALS).  o The family denies a history of developmental learning disorder (Dyslexia, ADHD, ASD, etc.).  o The family denies a history of mood/substance abuse disorder (MDD, GUANAKITO, Schizophrenia, etc.).   Developmental/Milestones:  o The patient/family report no known birth complications or early life problems. The patient met all developmental milestones.   Learning Disorders:  o The patient/family report no signs or symptoms suggestive of developmental learning disorder.   Education:  o 12 years of formal education.  o HS.   Social History:  o She is a smoker and we addressed smoking cessation today.   Relevant Medical History:  o ADHD (attention deficit hyperactivity disorder)  o Anticoagulant long-term use  o Anxiety  o Coronary artery disease  o Elevated LFTs  o GERD (gastroesophageal reflux disease) 2021  o Hyperlipemia 2021  o Hypertension  o LBBB (left bundle branch block)  o Major  depressive disorder, recurrent episode, mild 1/3/2022  o Obesity  o Paroxysmal A-fib   Relevant Exposure/Trauma to CNS:  o No History of Traumatic Brain Injury or Concussions  o No History of Chronic Mood Disorder  o No History of Chronic Stress  o No History of Chronic Substance Abuse  o No History of Malnutrition  o No History of Toxic Exposure     Neurocognitive Disorder Features   Onset/Duration:  o May 2021 (~9-month)   First Symptom:  o Memory impairment   Progression:  o Step-wise Progressive   Clinical Course:  o Psychiatrist (03/05/2013)  - Type: Chart Review. She has got a. History of adult ADHD and depression. She says she has been off of her. Medications now for 13 years. 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 Inattention and unable to finish task.  o Psychiatrist (07/05/2017)  - Type: Chart Review. She has ADD dx 20 years ago. She was on treatment for a while when dx then came off of treatment for many years. Four years ago she restarted. She was originally dx through psychiatry. She says before treatment she was very distractible and would often forget to pay bills. She would procrastinate and had trouble with her memory because her mind would go so fast. She often missed appointments and did not finish work. It was interfering in her job. 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 She works all hours of the day and usually 6 days a week. She takes her long acting adderrall at 6:30 am and then her short acting dose of 11 am. She takes everyday day. She denies any side effects and adds that it has helped her to be so much more productive and organized. No evidence of abuse by . Her last fill was 6/15/17 #30 on both adderrall XR and adderall short acting. Rx given today with stop date of 8/13/17. Good control on her reigmen and has been taking for the last 4 years.  "Will refer to [facility] to manage. No side effects.  o Neurologist (01/11/2022)  - Type: Chart Review. The patient STATES THAT SHE HAS BEEN HAVING RACING HEART ,S/P FALL,FACIAL PARESTHESIA, HUSBANDS STATES GETS CONFUSED, ANEUERYSM 3. 5 CM, AGGRAVATED WITH NO APPT WITH NEURO AT [facility],STILL SMOKES,SAW NEURORADIOLOGY NO COILING, LAST RFA PER DR COLE 6 MO AGO, SEE ROS.  o Neurologist (01/18/2022)  - Type: Chart Review. Presents with multiple complaints. She is accompanied by her  who assists with the history. She is having episodic L facial numbness followed by arm heaviness. Followed by headache. + photo/phonophobia, + nausea. Mother with migraine. Abdominal pain as a child. She fell a few weeks ago and has had worse headaches that are daily with associated neck stiffness. She reports memory trouble x 6 months: forgetfulness (leaves water on, forgets to brush hair). Poor sleep. + anxiety. ADHD. Stopped Adderall recently since CAD. Symptoms worse since. Tinnitus x few months. Few weeks of change in taste and laundry detergent smelled different. Started topiramate before taste change. Gait trouble: Occasional "foot crosses the other" since she stopped Adderall. 1. Cognitive disturbance: exam suggests attention/concentration deficit; suspect exacerbation due to discontinuation of Adderall; insomnia/anxiety likely contributing.     Current Presentation   Recent/Interim History:  o The patient presents with her  reporting a 1 year history of non bothersome memory deficits and brain fog that have acutely worsened within the last month and a half. The patient reports a lifetime history of ADHD like symptoms that have interfered with her ability to perform at school. The patient will be diagnosed with ADHD in her mid 20s would briefly be on Adderall before stopping during pregnancy. The patient would restart again in her 30s however given that her partner at the time was a drug abuser, she elected to stop " taking the medication to prevent it being stolen. The patient restart Adderall in her 40s and has been subsequently on the medication up until the last year.   o Over last 3 years the patient has had 2 cardiac ablations for chronic atrial fibrillation. The last 1 was in late 2020.  o In May 2021 the patient had new onset left facial numbness and weakness thought to be a stroke. The patient was evaluated [facility]. The patient was found to have a presumably incidental basilar tip aneurysm without evidence of acute ischemic stroke. The patient was scheduled for annual follow-up. Within the last calendar year the patient has had frequent recurrent episodes of left facial numbness and headache. Headaches are relatively frequent however will continue to have left facial numbness with and without headaches. The patient has been managed on venlafaxine Topamax and Nurtec without significant symptomatic benefit.   o In July 2021 the patient contracted COVID. The patient has subsequently mild brain fog and memory deficits following this exposure alongside anosmia since July 2021. In late 2021 it was recommended to her by her cardiologist to stop Adderall given her chronic atrial fibrillation despite to ablation. The patient discontinued Adderall after Christmas 2021. Within the week following this the patient reported increase in her in attention/ concentration deficits that were bothersome.   o In January 2, 2022 the patient had a no other symptomatic episode of left facial weakness. This prior to [facility] for evaluation. MRI done showed no evidence of acute ischemic stroke. The patient was subsequently discharged for outpatient management. Within less than 7 days of this event the patient had a ground level fall. The patient has no recollection of this fall. The patient does not recollect whether not she passed out or tripped. The patient does not know if she was unconscious following this fall. The patient did not  "protect herself upon this fall. The patient landed on her face breaking her nose in damaging her teeth. Following this ground level fall with facial trauma for which she did not seek out medical attention the patient reports new onset dysgeusia. She now reports everything "smells like garbage". This is reportedly resulted in a decrease in po Intake. The patient is unable to tolerate any food due to smell. She is only able to eat relatively dull foods and flavorless liquids and feels like she has lost weight over the last 2 weeks. During the same time frame the patient and her family report acute worsening in concentration and memory deficits. The patient reports having difficulty focusing on anything at work. The patient will often walk around the store almost and aimless state not knowing which she is doing. This is reportedly resulted in increasing distress and irritability. The patient reports sometimes driving through red lights while driving home. She feels like she has had a complete breakdown and is near crying during the evaluation. The patient reports being told that there are other medications other than Adderall for ADHD. The patient is not follow-uping with her physician group that was already prescribing her ADHD medication for unknown/unclear reasons.   o In the setting of all these issues the patient reports a background insomnia that does not appear to be acutely worsened. The patient typically has difficulty sleeping for any more than 3 hours at a time often awakening and doing some from the room. This is been a problem for many years.   Unresolved Concern(s) reported by patient/family:  o TBI/PCS  o ADHD with withdrawal of medication  o Insomnia        Review of cognitive, visuospatial, motor, sensory, and behavioral systems:     Memory:    Ms. Espinoza's memory has worsened in the past few years.   She does repeat statements or asks the same question repeatedly.   She does have difficulty " remembering recent important conversations.   She does have difficulty remembering recent events.   She does not forget information within minutes.   Her remote memory is intact.   Her recent retrograde memory is impaired.  Attention:    Her attention and concentration are impaired.   She does not have attentional fluctuations.   She does have difficulty with selective attention.   She does become easily distracted.   She does have difficulty with divided attention.  Executive:    Ms. Balderas cognitive processing speed is slower.   She does have difficulty with working memory.   She does misplace personal items (e.g., keys, cell phone, wallet) more frequently.   She does have difficulty keeping track of her medications.   She does not have difficulty with planning/organizing/completing multistep tasks.   She does not have difficulty with executive attention.   She does have difficulty with flexible thinking.   She does not difficulty with self control.   She is exhibiting new symptoms that suggest they have become more impulsive, rash and/or careless.   Her judgment is impaired.  Language:    Her speech output is unaffected.   She does forget people's names more frequently.   She does have word-finding difficulties.   Ms. Espinosas speech is fluent and non-effortful.   Ms. Espinosas speech is grammatically intact.   She does not make word substitutions.   She does not have difficulty reading.   She does not appear to have impaired comprehension.  Visuospatial:    She has new visuospatial problems.   She does not become confused or disoriented in *new*, unfamiliar places.   She does not have trouble with navigation.   She does not get lost in familiar places.   Ms. Espinoza does not have visuospatial disorientation.   She does not have difficulty recognizing objects or faces.   She denies problems with driving or parking.  Motor/Coordination:    Ms. Espinoza does have difficulty with  walking.   She does feel imbalanced.   She has fallen.   She does not appear to have new muscle weakness.   She does not have difficulty buttoning shirts, operating zippers, or manipulating tools/utensils.   Her handwriting has not become micrographic.   She does not have a resting tremor.   She denies having any new involuntary movements and/or muscle jerking.   She does not have swallowing difficulty.   She denies new muscle cramps and twitching.  Sensory:    Ms. Espinoza denies new numbness, tingling, paresthesias, or pain.   Ms. Espinoza denies a loss of vision, blurry vision, or double vision.   Ms. Espinoza denies new loss of hearing or worsening tinnitus.   Ms. Espinoza does have anosmia.  Sleep:    Ms. Espinoza reports difficulty sleeping.   Ms. Espinoza does have difficulty going to sleep.   Ms. Espinoza reports difficulty staying asleep and/or frequently awakening at night.   Ms. Espinoza does not snore or have witnessed apneas while sleeping.   When she wakes up in the morning, she does not feel well-rested.   She denies dream-enactment behavior.   She denies symptoms suggestive of restless leg syndrome.  Behavior:    Ms. Espinoza's personality has changed.   She does not have symptoms of disinhibition and social inappropriateness.   She does not have symptoms to suggest a loss of manners or decorum.   She does not appear apathetic or has decreased motivation.   She does not appear to have a change in inertia.   Ms. Espinosas emotional expression has changed.   She does not have emotional blunting or lability.   She does have symptoms of irritability and mood lability.   She has been reported to have new symptoms of agitation, aggression, or violent outbursts.   Her insight into his health and situation is intact.   Her personal hygiene is intact.   She is not exhibiting a diminished response to other people's needs and feelings?   She is not exhibiting a diminished social interest, interrelatedness,  or personal warmth.   She denies restlessness.   She denies new and/or worsening simple repetitive behaviors.   Her speech has not become simplified or become repetitive/stereotyped.   She denies new/worsening complex repetitive/ritualistic compulsions and behaviors.   She does not have symptoms of hyper-religiosity or dogmatism.   Her interests/pleasures have not become restrictive, simplified, interrupting, or repetitive.   She denies a change of self-stimulating behavior.   She denies any changes in eating behavior.   She denies increased consumption of food or substances.   She denies oral exploration or consumption of inedible objects.  Psychiatric:    She does feel depressed.   She is exhibiting symptoms of social withdrawal/indifference.   She does have anxiety.   She does not exhibit cycling behavior.   She does not exhibit hyperactive behavior.   She is not exhibited symptoms of paranoia.   She does not have delusions.   She does not have hallucinations.   She does not have a history of sensitivity to neuroleptic/psychotropic medications.  Medical Review of Systems:    Ms. Espinoza does not have constipation.   Ms. Espinoza does not have urinary incontinence.   Ms. Espinoza denies orthostatic lightheadedness.   Ms. Espinoza's weight is unstable. Comment: weight loss  Functional status:   Difficulty performing the following Instrumental ADLs:  o Housekeeping: No  o Food Preparation: No  o Shopping: No  o Ability to Handle Finances: No  o Transportation/Driving: No  o Household Appliances/Stove: No  o Laundry: No   Difficulty performing the following Basic ADLs:  o Dressing: No  o Bathing: No  o Toileting: No  o Personal hygiene and grooming: No  o Feeding: No  Care Management:   Patient/Family Safety Concerns:  o Medication Adherence: No  o Home Safety: No  o Wandered: No  o Firearms: No  o Fall Risk: No  o Home Alone: No    Past Medical History:   Diagnosis Date    ADHD (attention deficit  hyperactivity disorder)     Anemia     Anticoagulant long-term use     Anxiety     Coronary artery disease     CAD    Elevated LFTs     elevated enzymes    GERD (gastroesophageal reflux disease) 4/24/2021    Hyperlipemia 4/24/2021    Hypertension     LBBB (left bundle branch block)     Lower extremity edema     Major depressive disorder, recurrent episode, mild 1/3/2022    Obesity     Paroxysmal A-fib     Skin cancer     skin ca       Past Surgical History:   Procedure Laterality Date    ABLATION  04/25/2021    to heart for a-fib x 2    CORONARY ANGIOGRAPHY N/A 5/6/2021    Procedure: ANGIOGRAM, CORONARY ARTERY;  Surgeon: Jesus Sol MD;  Location: [facility] CATH;  Service: Cardiology;  Laterality: N/A;    LEFT HEART CATHETERIZATION N/A 5/6/2021    Procedure: Left heart cath;  Surgeon: Jesus Sol MD;  [facility];  Service: Cardiology;  Laterality: N/A;    TONSILLECTOMY      TUBAL LIGATION         Family History   Problem Relation Age of Onset    Diabetes Mother     Osteoporosis Maternal Grandmother     Pancreatic cancer Brother        Social History     Socioeconomic History    Marital status:    Occupational History    Occupation: [employer]   Tobacco Use    Smoking status: Current Every Day Smoker     Packs/day: 1.00     Years: 40.00     Pack years: 40.00     Types: Cigarettes    Smokeless tobacco: Never Used    Tobacco comment: down to 0.25 pack of  cigarettes per day   Substance and Sexual Activity    Alcohol use: Not Currently    Drug use: No       Medication:     Current Outpatient Medications on File Prior to Visit   Medication Sig Dispense Refill    apixaban (ELIQUIS) 5 mg Tab Take 5 mg by mouth 2 (two) times a day.      aspirin (ECOTRIN) 81 MG EC tablet Take 1 tablet (81 mg total) by mouth once daily. (Patient taking differently: Take 81 mg by mouth 3 (three) times a week.) 90 tablet 1    bisoprolol (ZEBETA) 5 MG tablet Take 1 tablet  (5 mg total) by mouth once daily. 30 tablet 1    flecainide (TAMBOCOR) 100 MG Tab Take 100 mg by mouth every 12 (twelve) hours.      gabapentin (NEURONTIN) 300 MG capsule Take 1 capsule (300 mg total) by mouth 3 (three) times daily. 90 capsule 11    multivitamin with minerals tablet Take 1 tablet by mouth once daily.      nitroGLYCERIN (NITROSTAT) 0.4 MG SL tablet Place 1 tablet (0.4 mg total) under the tongue every 5 (five) minutes as needed for Chest pain. 30 tablet 1    pantoprazole (PROTONIX) 40 MG tablet Take 1 tablet (40 mg total) by mouth once daily. 90 tablet 2    rosuvastatin (CRESTOR) 40 MG Tab Take 1 tablet (40 mg total) by mouth every evening. 90 tablet 3    [DISCONTINUED] NURTEC 75 mg odt dissolve 1 tablet sublingually daily as needed for migraine      [DISCONTINUED] topiramate (TOPAMAX) 25 MG tablet Take one tablet in pm for 1 week then 1 pill bid for 1 week then 1 pill in am and 2 pills qhs for 1 week then 2 pills bid 70 tablet 0    [DISCONTINUED] topiramate (TOPAMAX) 50 MG tablet Take 1 tablet (50 mg total) by mouth 2 (two) times daily. 60 tablet 11     No current facility-administered medications on file prior to visit.        Review of patient's allergies indicates:   Allergen Reactions    Effexor [venlafaxine] Other (See Comments)     Bruising all over body and elevated blood pressure       Medications Reconciliation:   I have reconciled the patient's home medications and discharge medications with the patient/family. I have updated all changes.  Refer to After-Visit Medication List.    Objective:  Vital Signs:  Vitals:    02/10/22 1408   BP: 116/75   Pulse: 64     Wt Readings from Last 3 Encounters:   02/10/22 1408 98.5 kg (217 lb 4.2 oz)   01/31/22 0816 95.3 kg (210 lb)   01/18/22 1004 95.7 kg (210 lb 15.7 oz)     Body mass index is 32.55 kg/m².     Neurological examination:      Mental Status:       Ms. Espinoza is awake; Her energy level appeared normal.   Her appearance was abnormal.     Comment: disheveled   Her orientation is normal; Spatial 5/5 (location, the floor of building, city, county, state) and temporal 5/5 (month, day, year, SAMSON) dimensions are accurate.   Her attention/concentration is impaired.    Comment: hyperactive in attention deficit was somewhat childish behavior   She can complete three-step commands.   Her thought process is not logical or goal-oriented.    Comment: tangential and circumstantial thoughts   She has no evidence of hallucinations (auditory, visual, olfactory).   She has no evidence of delusions (paranoid, grandiose, bizarre).   She demonstrated impaired judgment based on actions and plans for the future.   She demonstrated appropriate insight based on actions, awareness of her illness, plans for the future.   Throughout the interview, she is cooperative, her eye contact is appropriate.  Cranial Nerves:    Her pupils were normal.   Her visual fields were full to confrontation in all quadrants.   Her ocular pursuit in the horizontal and vertical plane was complete.   Her saccadic initiation, velocity, and amplitude are normal.   She demonstrated no square-wave jerks.   Her eyelid assessment showed no apraxia. There was no eyelid dysfunction, retraction, or marks sign.   Her facial sensation was intact to light touch bilaterally.   Her facial strength was normal.   Her facial expression was symmetric and appropriate to the context.   Her hearing was normal bilaterally.   Her tongue showed no evidence of scalloping.   She can protrude their tongue beyond Her lips for >10 sec.   She can move their extended tongue back and forth rapidly.   She had no significant evidence of anterocollis or retrocollis.  Speech/Language:    Ms. Espinosas speech was fluent, non-effortful, and her rate was appropriate to the context.   Her speech volume is within normal range and appropriate to the context.   Her speech rate is normal.   Her respirations  "are within normal range and appropriate to context.   Her speech timbre is normal.   She has no articulation (segmental features) errors.   She has no speech dysdiadochokinesia with repetition of syllables such as "/PA/, /TA/, /KA/, /OM/".   She made no errors during the repetition of rapid syllables and or words such as "caterpillar" "", and "huckleberry"   She has no repetition errors of rapid sequences of consonants, such as in "Spiritism Samaritan" or "[employer]".   She has no prosody (suprasegmental features) errors.   Her stress assessment showed no repetition errors in linguistically complex words, including multisyllabic words ("planetarium," "questionable," "accomplishment," "phonetic.   Ms. Balderas speech is not dysarthric.   Ms. Balderas speech was without evidence of anomia.   She showed no evidence of anomia during spontaneous speech.   She showed no evidence of anomia during confrontational naming; 12/12 (correct chocolate bar, kangaroo, theater, Religious, doctor, potato, battery, ice cube tray, thermometer, flower, bomb, and calendar).   She makes no phonological loop errors.   She makes no errors during the repetition of gibberish words (e.g., "Supercalifragilisticexpialidocious," "Pigglywiggly," "[place]," "Zowzy," "Bazinga").   She makes no errors during the repetition of complex meaningless phrases (e.g., "The horse raced past the barn fell.", "The complex houses  and single soldiers and their families," "Wishes are hopping, and trees are west," and "Brushing liked to edenilson shante's direction").   She can comprehend commands that cross the midline (e.g., with your left thumb, touch your right ear).   She can comprehend commands that depend on syntax (e.g., point to the ceiling after you point to the floor).   Her speech is grammatically intact; (no function/semantic word substitutions, phonemic/semantic paraphasias, or binary confusion).  Motor:    Ms. " Alexis's bilateral upper extremity muscle bulk is appropriate.   Ms. Espinoza's bilateral upper extremity muscle tone is not increased.   There was no dystonia observed on examination.   Assessment of motor strength was symmetric and at minimal anti-gravity.   There is no pronator or downward drift.   There is no upward drift.   There is no outward/diagonal drift.   There is no myoclonus observed in Ms. Espinoza's bilateral upper and lower extremities.   There are no fasciculations observed in Ms. Espinoza's bilateral upper and lower extremities.  Coordination:    She has no bilateral upper extremity limb dysmetria or past pointing on finger-nose-finger bilaterally.   She has no bilateral lower extremity limb dysmetria during shin rub.   She showed evidence of limb dysdiadochokinesia of the upper extremity on the pronation/supination test.    Comment: mild R>L worse   She demonstrated evidence of cerebellar rebound.    Comment: mild b/l   She has a visible tremor.   She has no kinetic tremor bilaterally.   She has a postural tremor.   She has no resting tremor bilaterally.   She has no evidence of interhemispheric motor control deficits.   She has no motor overflow bilaterally.   She demonstrates no alien limb phenomena.   She has no dyskinetic movements.   Ms. Espinoza's bilateral upper extremity coordination with finger tapping, pronation/supination, and the open-close fist showed no slowing, no hypometria, and no dysrhythmia inconsistent with bradykinesia.   Ms. Espinoza's bilateral upper extremity coordination with finger tapping, pronation/supination, and the open-close fist showed slowing.   Ms. Espinoza's bilateral upper extremity coordination with finger tapping, pronation/supination, and the open-close fist showed hypometria.   Ms. Espinoza's bilateral upper extremity coordination with finger tapping, pronation/supination, and the open-close fist showed dysrhythmia.  Higher Cortical Function:    Ms.  Alexis showed no evidence of simultanagnosia (Navon hierarchical letters).   She demonstrates no evidence of dorsal simultanagnosia (overlapping objects).   She demonstrates no evidence of ventral simultanagnosia (complex picture synthesis).   Ms. Espinoza showed evidence of visuospatial constructional dysfunction.   Ms. Espinoza showed no evidence of agnosia.   Ms. Espinoza showed evidence of angular gyrus disconnection (insular-operculum).   She has left-right confusion.   She has evidence of dysgraphia.   Ms. Espinoza showed no evidence of apraxia.   She showed no evidence of ideomotor apraxia performing tool-use pantomimes (e.g., use a hammer, use a screwdriver, use a comb, flip a coin, waving goodbye).   She showed no evidence of ideational apraxia (e.g., taking off and putting on shoes, folding paper into an envelope).   She showed no evidence of limb-motor apraxia while mimicking complex bimanual hand shapes.   She showed no evidence of buccofacial apraxia (e.g., blow out a candle, puff out cheeks, and whistle).   She showed no dysexecutive behavior.   She showed no utilization or imitation behavior.   She has no perseverative or stereotyped behavior.   She has no stimulus-bound behavior.  Sensory:    Her cortical sensory assessment demonstrated no neglect bilaterally.  Reflexes:    Reflexes were symmetric and 2+ at biceps, 2+ triceps, and 2+ brachioradialis, 2+ at the knees bilaterally, there was no cross-abductor sign, 2+ in the bilateral ankles.  Gait:    She has normal posture sitting unaided.   She is unable to rise from a chair and sit back down without using their arms.   Her gait was abnormal.   Her posture while walking is normal.   Her gait initiation/inhibition was normal.   Her stance while walking is normal.   Her gait speed (6 meters) was normal (70-80 F 1.13 m/s M 1.26 m/s, >80 F 0.94 m/sec, M 0.97 m/sec).   Her stride including step-time, step-width, and step-length was  normal.   Her arms swing is symmetric and of normal amplitude.   She takes turns in >4 steps.   She has truncal ataxia.   When attempting to walk abnormally (heels, tiptoes, tandem), she makes errors.   While walking on her tiptoes for ten steps, she makes deviations.   While walking on her heels for ten steps, she makes deviations.   While walking tandem for ten steps, she makes deviations.   While walking with eyes closed for ten steps, she makes deviations.    Comment: veers to the right   She has evidence of posture/balance impairment.   Romberg's sign was present.    Comment: veers backwards       Neuropsychological Evaluation Summary:     Prior Neurocognitive/Neuropsychological Evaluations   Summary from EMR:    Neurocognitive Evaluation completed on 02/10/2022:  Memory   Registration-3 3/3 Within Normal Limits.   Recall-3 3/3 Within Normal Limits.   Recall-5 4/5 Impairment: Mild to Normal.   Executive   Three-step command 3/3 Within Normal Limits.   Trials-1 1/1 Within Normal Limits.   WORLD Backward 5/5 Within Normal Limits.   Digit Span - 2 1/2 Impairment: Moderate.   Serial Sevens 3/3 Within Normal Limits.   Fluency 1/1 Within Normal Limits.   Digit Span Backwards 4 Within Normal Limits.   Lexical Fluency - D 8 Impairment: -1.8 STDs below the average score based on age and education.   Lexical Fluency - F 12 Within Normal Limits.   Lexical Fluency - A 13 Within Normal Limits.   Semantic Fluency - Animals 8 Impairment: -3.1 STDs below the average score based on age and education.   Visuospatial   Intersecting Pentagons 0/1 Impairment: Significant.   3D Cube Copy 0/1 Impairment: Significant.   Clock Draw 1/3 Impairment: Significant.   Attention   Orientation-10 9/10 Impairment: Mild to Normal.   Orientation-6 5/6 Impairment: Mild to Normal.   Alternating Sequence 1/1 Within Normal Limits.   Digit Span Forwards 7 Within Normal Limits.   Language   Repetition-1 1/1 Within Normal Limits.   Naming-2  2/2 Within Normal Limits.   Following written command 1/1 Within Normal Limits.   SYDBAT - Semantic Association 30/30 Within Normal Limits.   Writing a complete sentence 1/1 Within Normal Limits.   Naming-3 3/3 Within Normal Limits.   Repetition-2 2/2 Within Normal Limits.   Abstraction 1/2 Impairment: Moderate.   Confrontational Naming 12/12 Within Normal Limits.   Repetition of Phrases 5/5 Within Normal Limits.   Verbal Agility 6/6 Within Normal Limits.   Repeat & Point - Nonfluent 10/10 Within Normal Limits.   Repeat & Point - Semantics 10/10 Within Normal Limits.   Aggregate Scores   MMSE 28/30 MMSE Score suggestive of normal to questionable cognitive impairment.   MOCA 23/30 MOCA Score suggestive of mild cognitive impairment.   Neuropsychiatric/Behavioral Focused Evaluation Assessment   BEHAV5+ 4/6 See ROS section for a full description   Laboratories:     Lab Date Value [Reference]   Coagulopathy Screening           aPTT 05/15/2021  32.0 [24.6 - 36.7 sec]      Metabolic Screening   Free T4 2021, May-15    0.78 [0.78 - 2.19 ng/dL]      Hemoglobin A1C External 01/18/2022  5.3 [4.0 - 5.6 %]      Methlymalonic Acid 2022, Jan-18    0.29      Procalcitonin 01/11/2022  0.07      TSH 05/15/2021  2.115 [0.400 - 4.000 uIU/mL]  5.480 (H) [0.400 - 4.000 uIU/mL]      Folate 01/18/2022  11.9 [4.0 - 24.0 ng/mL]      Vitamin B-12 05/15/2021  545 [210 - 950 pg/mL]  481 [210 - 950 pg/mL]      Neuroendocrine/Electrolyte Screening   Magnesium 2021, May-15    2.2 [1.6 - 2.6 mg/dL]      Phosphorus 2021, May-15    3.8 [2.7 - 4.5 mg/dL]      Infectious Disease/Immunocompromised Screening   SARS-CoV-2 RNA, Amplification, Qual 2021, Jul-17    Positive (A)      Delirium Screening   Bacteria, UA 2021, May-14    Negative [Negative /hpf]           Neuroimaging:    MRI brain/head without contrast on 1/2/22   Formal interpretation by Radiology:   Normal parenchymal volume. Stable scattered T2 FLAIR hyperintense white matter foci are  present, likely related to chronic microvascular ischemic change. No parenchymal restricted diffusion. No evidence of intracranial hemorrhage. No extra-axial fluid collection or mass. No intracranial mass effect. No hydrocephalus. Midline structures have a normal configuration. Visualized pituitary gland and infundibulum are normal. Visualized major intracranial vascular structures demonstrate normal flow voids and are normal in course and caliber.   Independently reviewed radiological imaging by Jt Jain MD. MPH. Behavioral Neurologist   T1: Largely normal brain slight widening of the marginal sulci in the right hemisphere. Cannot comment on hippocampi as no coronal views available. Slight thinning of the posterior cingulate. Otherwise largely normal brain.   T2/FLAIR: Subtle white matter hyperintensity left pontine region with wispy nature possible wallerian degeneration. Scattered high dorsal punctuated subcortical white matter changes with both the large somewhat oval punctuated lesions in the left subcortical hemisphere and mild intermittent hyperintensities in the right Zeng in turn a watershed territories likely suggestive of uncontrolled atherosclerotic risk factors.   DWI/ADC: No Significant DWI hyperintensities/hypointensities. No ADC correlation.   SWI/GRE: No Significant hypointensities to suggest cortical/subcortical hemosiderin deposition.   Impression: : Largely normal brain with slight sulcal widening of the right marginal sulcus. Scattered subcortical white matter disease most well distributed in the internal watershed territories left greater than right consistent with atherosclerotic microvascular disease.     Procedures:    Electrocardiogram on 1/2/22   Formal interpretation:   Vent. Rate : 085 BPM     Atrial Rate : 085 BPM    P-R Int : 170 ms          QRS Dur : 092 ms     QT Int : 386 ms       P-R-T Axes : 053 071 079 degrees    QTc Int : 459 ms Sinus rhythm with Premature  supraventricular complexes Nonspecific ST abnormality Otherwise normal ECG   Independently reviewed Electrocardiogram by Jt Jain MD. MPH. Behavioral Neurologist   Impression: : Received ECG has no evidence of sinus node disease. HR (>=50-60). Prolonged CA interval (>0.22 s). Broad QRS complex (> 0.12 s).     Clinical Summary:     Ms. Espinoza is a 61-year-old right-handed female with a relevant past medical history of PAF, HTN, HLD, ADHD, CAD, MDD, who presents reporting a 9-month history of step-wise progressive neurocognitive impairment.       The clinical history is suggestive of:   Memory Impairment: STM encoding impairment, LTM encoding-retrieval impairment   Attention Impairment: Attention, Selective attention, Sustained attention, Shifting attention   Executive Impairment: Energization, Working Memory, Response Inhibition   Language Impairment: Language Dysfunction   Motor/Coordination Impairment: Sensory motor integration   Sensory Impairment: Limbic Dysfunction   Behavior Impairment: Emotional Regulation   Psychiatric Impairment: Neurovegetative, Social Coherence, Signal-Noise Dysregulation  The neurological examination is significant for:   Cerebellar Dysfunction: dysdiadochokinesia, rebound, truncal ataxia (walking), stance imbalance (Romberg sign)   Cortical Temporal-Parietal Dysfunction: left-right confusion, dysgraphia   Executive Impairment: thought disorder, judgment   Motor Coordination: gait imbalance (tiptoes)   Movement Disorder (Gait): strength (difficulty rising), abnormal features (difficulty turning), dorsiflexion weakness (heel walking)   Movement Disorder (Hyperkinetic): tremor (postural)  Informal neuropsychology battery is positive (based on age and education) for:   Executive predominant multidomain MCI   Moderate Visuospatial Impairment: visuospatial construction.   Moderate Executive Impairment: She scored >3 standard deviations below the norm on at least one  measure. She had difficulty with semantic fluency, lexical fluency, semantic fluency.   Moderate Language Impairment: abstract.   Very Mild Memory Impairment: recall.   Very Mild Attention Impairment: orientation.   MMSE 28/30: MMSE Score suggestive of normal to questionable cognitive impairment.   MOCA 23/30: MOCA Score suggestive of mild cognitive impairment.   BEHAV5+ 4/6: See ROS section for a full description  Neurological imaging   MRI brain/head without contrast (1/2/22): Largely normal brain with slight sulcal widening of the right marginal sulcus. Scattered subcortical white matter disease most well distributed in the internal watershed territories left greater than right consistent with atherosclerotic microvascular disease.        Assessment:        Ms. Espinoza's clinical presentation is dysexecutive predominant mild cognitive impairment (CDR-SOB: 2 - Questionable cognitive impairment).     Ms. Espinoza's clinical syndrome is best described as Mild Cognitive Impairment (MCI-ADRC) (Nelson MS, et al. 2011 Alzheimer's & Dementia).   Concern regarding an intraindividual change in cognition   Impairment in one or more cognitive domains   Preservation of independence in functional abilities.   Not demented     The pathology underlying Ms. Espinoza's cognitive impairment is unclear at this time. The the patient reports mild brain fog and subtle memory deficits since exposure to COVID in July 2021. In addition to the respiratory and gastrointestinal symptoms that accompany COVID-19, many people who have been exposed to the virus experience short- and/or long-term neuropsychiatric symptoms, including but not limited to cognitive and attention deficits, often referred to as 'brain fog'. For some, these neurological symptoms persist, and researchers are working to understand the mechanisms by which this brain dysfunction occurs, and what that means for cognitive health long term. Since the discontinuation of  Adderall in December 2021 the patient reports an acute/abrupt worsening in attention and concentration. Adult ADHD shares many overlapping features with MCI, including cognitive deficits (particularly in memory, attention and executive functioning) and psychiatric comorbidities (such as anxiety, depression, and sleep disorders), which are often misattributed to new late life neurocognitive impairment. However the patient had a significant ground level fall in the 1st week of 2022  with subsequent worsening of all aforementioned symptoms and now with severe persistent dysgeusia, worsening left facial numbness, and executive dysfunction.      The observations made above were discussed with the patient and her family. The patient has a lifetime history of developmental ADHD with variable compliance with Adderall in her 20s, 30s, and has been subsequently consistent with this this her 40s. The patient reports new brain fog prior since beginning either after COVID in July 2021 or stroke-like episode in May 2021. In the months subsequently thereafter the patient had persistent brain fog there was otherwise not bothersome. Cardiology recently recommended the patient discontinue Adderall given 2 cardiac ablations. The patient volitionally discontinued Adderall following Christmas 2021. In the week subsequently thereafter the patient reported new worsening attention/ concentration deficits. The patient was briefly admitted to local hospital for stroke-like episode described as left facial droop. Less than 1 week following his hospitalization the patient had a ground level fall with head trauma. Subsequently thereafter patient's anosmia has turned into dysgeusia with worsening this executive dysfunction out of proportion to simple ADHD medication withdrawal. 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 Recommend repeating MRI brain stat without contrast and acquiring  screening labs for reversible cause of cognitive.        Care Management Plan:     #Diagnostic Workup:    Laboratories: Hcy, Free T4, TSH, B1, B9, B12, MMA, HIV Ab/Ag, RPR, D   MRI brain w/wo contrast, EKG  #Neurocognitive Disorder Treatment:   Following ECG will consider trial of donepezil   We have discussed opportunities for further testing with CSF biomarkers or Amyloid-PET  #Behavioral Disorder Treatment:   No indication for memantine at this time  #Insomnia Treatment:   We recommend a trial of Ramelteon 8mg qHS  #Microvascular Disease Management:   Following fasting lipid profile results, we will consider adding pravastatin or rosuvastatin   Continue aspirin 81 mg  #Atypical Acephalgic HA:   Referral to HA medicine  #Behavioral/Environmental Treatment   We recommend engaging in activities that stimulate cognitively and socially while avoiding excessive stimulation and fatigue in overwhelmingly complex situations.   We recommend integrating routine and schedule into your daily life. https://www.alzheimersproject.org/news/the-importance-of-routine-and-familiarity-to-persons-with-dementia/  #Health Maintenance/Lifestyle Advice   We have discussed the value in aggressively controlling vascular risk factors like hypertension, hyperlipidemia, and Diabetes SBP<130, LDL<100, A1C<7.0.   We discussed the need to optimize lifestyle choices including a heart-healthy diet (e.g., Mediterranean or DASH), increased cardiovascular exercise (goal 150 minutes of moderate-intensity per week), and stay cognitively and socially active.  #Support   We all need support sometimes. Get easy access to local resources, community programs, and services. https://www.communityresourcefinder.org/   Learn more about Cognitive Impairment in [place]: https://www.alz.org/professionals/public-health/state-overview/[place]  #Safety   The  "Alzheimer's Association administers the nationwide "Safe Return" program with identification bracelets, necklaces, or clothing tags and 24-hour assistance. More information is available online at https://www.alz.org/help-support/caregiving/safety/medicalert-with-24-7-wandering-support  #Follow up:   Follow-up in 4 weeks (Mar 2022).    Thank you for allowing us to participate in the care of your patient. Please do not hesitate to contact us with any questions or concerns.     It was a pleasure seeing Ms. Espinoza and we look forward to seeing them at their follow-up visit.     This note is dictated on M*Modal Fluency Direct word recognition program. There are word recognition mistakes that are occasionally missed on review.      Scheduled Follow-up :  Future Appointments   Date Time Provider Department Center   2/10/2022  6:00 PM [facility] [id number] [facility]   2/11/2022  9:00 AM Janelle Adames PTA [facility] OP [place]   2/14/2022 11:20 AM Hai Alaniz MD [facility] ORTHO [place]   2/14/2022  2:40 PM Vasquez Granger MD [facility] ARRHYTH [place]   2/15/2022  2:00 PM Makenna Menjivar PT [facility] [place]   2/21/2022 11:45 AM EKG, APPT [facility] EKG [facility]   2/22/2022  9:00 AM Wendy Rodriguez DO [facility] MED [place]   7/6/2022 10:10 AM [facility]   7/11/2022  9:40 AM Wendy Rodriguez DO [facility] [place]   7/18/2022  9:45 AM Jesus Sol MD [facility] CARDIO [place]       After Visit Medication List :     Medication List          Accurate as of February 10, 2022  5:28 PM. If you have any questions, ask your nurse or doctor.            START taking these medications    ramelteon 8 mg tablet  Commonly known as: ROZEREM  Take 1 tablet (8 mg total) by mouth every evening.  Started by: Jt Francisco MD        CHANGE how you take these medications    aspirin 81 MG EC tablet  Commonly known as: ECOTRIN  Take 1 tablet (81 mg total) by mouth once daily.  What changed: when to take this      "   CONTINUE taking these medications    apixaban 5 mg Tab  Commonly known as: ELIQUIS     bisoprolol 5 MG tablet  Commonly known as: ZEBETA  Take 1 tablet (5 mg total) by mouth once daily.     flecainide 100 MG Tab  Commonly known as: TAMBOCOR     gabapentin 300 MG capsule  Commonly known as: NEURONTIN  Take 1 capsule (300 mg total) by mouth 3 (three) times daily.     multivitamin with minerals tablet     nitroGLYCERIN 0.4 MG SL tablet  Commonly known as: NITROSTAT  Place 1 tablet (0.4 mg total) under the tongue every 5 (five) minutes as needed for Chest pain.     pantoprazole 40 MG tablet  Commonly known as: PROTONIX  Take 1 tablet (40 mg total) by mouth once daily.     rosuvastatin 40 MG Tab  Commonly known as: CRESTOR  Take 1 tablet (40 mg total) by mouth every evening.        STOP taking these medications    topiramate 25 MG tablet  Commonly known as: TOPAMAX  Stopped by: Jt Francisco MD     topiramate 50 MG tablet  Commonly known as: TOPAMAX  Stopped by: Jt Francisco MD           Where to Get Your Medications      These medications were sent to [address] [address]    Phone: [phone]   · ramelteon 8 mg tablet         Signing Physician:  Jt Francisco MD    Billing:    -----------------------------------------------------------------------------    I spent a total of 105 minutes (time-in: 14:30 PM; time-out: 16:15 PM) on 02/10/2022, in-person face-to-face with the patient and caregiver(s), >50% of that time was spent counseling regarding the symptoms, treatment plan, risks, therapeutic options, lifestyle modifications, and/or safety issues for the diagnoses above.    10/14 Review of Systems completed and is negative except as stated above in HPI (Systems reviewed: Const, Eyes, ENT, Resp, CV, GI, , MSK, Skin, Neuro)    I reviewed previous labs for a total of 5 minutes on 02/10/2022. This is directly related to the face-to-face  encounter. Review of previous labs was performed all negative except as stated above in HPI    I reviewed previous diagnostic testing for a total of 5 minutes on 02/10/2022. This is directly related to the face-to-face encounter. A review of previous diagnostic testing was performed was noted to be within normal limits except as is stated above in HPI    I performed a neurobehavioral status examination that included a clinical assessment of thinking, reasoning, and judgment. Please see above HPI and ROS for full details. This exam was performed on 02/10/2022 and included 12 minutes spent on direct face-to-face clinical observation and interview with the patient and 20 minutes spent interpreting test results and preparing the report. The total time of 32 minutes spent on the neurobehavioral status examination is not included in the time spent on evaluation and management coding.    I performed a neuropsychological evaluation that included the application of a series of standardized neurocognitive tests. Please see the informal neuropsychological assessment above for full details. This evaluation was performed on 02/10/2022 and included 12 minutes spent on direct face-to-face clinical standardized test administration with the patient and 26 minutes spent on interpreting standardized test results, integrating patient data into a treatment plan, and providing feedback to the patient and caregiver. The total time of 38 minutes spent on the neuropsychological evaluation is not included in the time spent on evaluation and management coding.        Total Billing time spent on encounter/documentation for this patient's evaluation and management, not including the neurobehavioral status examination and neuropsychological evaluation: 91 minutes.

## 2022-02-11 ENCOUNTER — PATIENT OUTREACH (OUTPATIENT)
Dept: ADMINISTRATIVE | Facility: OTHER | Age: 62
End: 2022-02-11
Payer: COMMERCIAL

## 2022-02-11 NOTE — PROGRESS NOTES
Health Maintenance Due   Topic Date Due    COVID-19 Vaccine (1) Never done    Shingles Vaccine (1 of 2) Never done     Updates were requested from care everywhere.  Chart was reviewed for overdue Proactive [facility] Encounters (ALESSANDRO) topics (CRS, Breast Cancer Screening, Eye exam)  Health Maintenance has been updated.  LINKS immunization registry triggered.  Immunizations were reconciled.

## 2022-02-14 ENCOUNTER — OFFICE VISIT (OUTPATIENT)
Dept: ORTHOPEDICS | Facility: CLINIC | Age: 62
End: 2022-02-14
Payer: COMMERCIAL

## 2022-02-14 ENCOUNTER — OFFICE VISIT (OUTPATIENT)
Dept: CARDIOLOGY | Facility: CLINIC | Age: 62
End: 2022-02-14
Payer: COMMERCIAL

## 2022-02-14 VITALS — BODY MASS INDEX: 32.16 KG/M2 | HEIGHT: 69 IN | WEIGHT: 217.13 LBS

## 2022-02-14 VITALS
WEIGHT: 215.19 LBS | BODY MASS INDEX: 31.87 KG/M2 | DIASTOLIC BLOOD PRESSURE: 74 MMHG | SYSTOLIC BLOOD PRESSURE: 129 MMHG | HEIGHT: 69 IN | HEART RATE: 62 BPM

## 2022-02-14 DIAGNOSIS — F90.9 ATTENTION DEFICIT HYPERACTIVITY DISORDER (ADHD), UNSPECIFIED ADHD TYPE: ICD-10-CM

## 2022-02-14 DIAGNOSIS — I10 ESSENTIAL HYPERTENSION: ICD-10-CM

## 2022-02-14 DIAGNOSIS — R41.3 MEMORY LOSS: ICD-10-CM

## 2022-02-14 DIAGNOSIS — M65.4 DE QUERVAIN'S TENOSYNOVITIS, RIGHT: Primary | ICD-10-CM

## 2022-02-14 DIAGNOSIS — I48.0 PAROXYSMAL ATRIAL FIBRILLATION: Primary | ICD-10-CM

## 2022-02-14 DIAGNOSIS — I67.1 CEREBRAL ANEURYSM: ICD-10-CM

## 2022-02-14 PROBLEM — Z86.79 HISTORY OF LEFT BUNDLE BRANCH BLOCK (LBBB): Status: RESOLVED | Noted: 2021-04-24 | Resolved: 2022-02-14

## 2022-02-14 PROCEDURE — 3008F PR BODY MASS INDEX (BMI) DOCUMENTED: ICD-10-PCS | Mod: CPTII,S$GLB,, | Performed by: ORTHOPAEDIC SURGERY

## 2022-02-14 PROCEDURE — 99999 PR PBB SHADOW E&M-EST. PATIENT-LVL III: ICD-10-PCS | Mod: PBBFAC,,, | Performed by: ORTHOPAEDIC SURGERY

## 2022-02-14 PROCEDURE — 99213 PR OFFICE/OUTPT VISIT, EST, LEVL III, 20-29 MIN: ICD-10-PCS | Mod: 25,S$GLB,, | Performed by: ORTHOPAEDIC SURGERY

## 2022-02-14 PROCEDURE — 20550 NJX 1 TENDON SHEATH/LIGAMENT: CPT | Mod: RT,S$GLB,, | Performed by: ORTHOPAEDIC SURGERY

## 2022-02-14 PROCEDURE — 3074F PR MOST RECENT SYSTOLIC BLOOD PRESSURE < 130 MM HG: ICD-10-PCS | Mod: CPTII,S$GLB,, | Performed by: INTERNAL MEDICINE

## 2022-02-14 PROCEDURE — 3008F BODY MASS INDEX DOCD: CPT | Mod: CPTII,S$GLB,, | Performed by: ORTHOPAEDIC SURGERY

## 2022-02-14 PROCEDURE — 3078F PR MOST RECENT DIASTOLIC BLOOD PRESSURE < 80 MM HG: ICD-10-PCS | Mod: CPTII,S$GLB,, | Performed by: INTERNAL MEDICINE

## 2022-02-14 PROCEDURE — 3044F HG A1C LEVEL LT 7.0%: CPT | Mod: CPTII,S$GLB,, | Performed by: INTERNAL MEDICINE

## 2022-02-14 PROCEDURE — 1159F PR MEDICATION LIST DOCUMENTED IN MEDICAL RECORD: ICD-10-PCS | Mod: CPTII,S$GLB,, | Performed by: INTERNAL MEDICINE

## 2022-02-14 PROCEDURE — 3008F PR BODY MASS INDEX (BMI) DOCUMENTED: ICD-10-PCS | Mod: CPTII,S$GLB,, | Performed by: INTERNAL MEDICINE

## 2022-02-14 PROCEDURE — 1160F PR REVIEW ALL MEDS BY PRESCRIBER/CLIN PHARMACIST DOCUMENTED: ICD-10-PCS | Mod: CPTII,S$GLB,, | Performed by: ORTHOPAEDIC SURGERY

## 2022-02-14 PROCEDURE — 99213 OFFICE O/P EST LOW 20 MIN: CPT | Mod: 25,S$GLB,, | Performed by: ORTHOPAEDIC SURGERY

## 2022-02-14 PROCEDURE — 3044F HG A1C LEVEL LT 7.0%: CPT | Mod: CPTII,S$GLB,, | Performed by: ORTHOPAEDIC SURGERY

## 2022-02-14 PROCEDURE — 3074F SYST BP LT 130 MM HG: CPT | Mod: CPTII,S$GLB,, | Performed by: INTERNAL MEDICINE

## 2022-02-14 PROCEDURE — 1159F MED LIST DOCD IN RCRD: CPT | Mod: CPTII,S$GLB,, | Performed by: INTERNAL MEDICINE

## 2022-02-14 PROCEDURE — 99205 OFFICE O/P NEW HI 60 MIN: CPT | Mod: S$GLB,,, | Performed by: INTERNAL MEDICINE

## 2022-02-14 PROCEDURE — 99999 PR PBB SHADOW E&M-EST. PATIENT-LVL III: ICD-10-PCS | Mod: PBBFAC,,, | Performed by: INTERNAL MEDICINE

## 2022-02-14 PROCEDURE — 1160F RVW MEDS BY RX/DR IN RCRD: CPT | Mod: CPTII,S$GLB,, | Performed by: ORTHOPAEDIC SURGERY

## 2022-02-14 PROCEDURE — 3044F PR MOST RECENT HEMOGLOBIN A1C LEVEL <7.0%: ICD-10-PCS | Mod: CPTII,S$GLB,, | Performed by: ORTHOPAEDIC SURGERY

## 2022-02-14 PROCEDURE — 99205 PR OFFICE/OUTPT VISIT, NEW, LEVL V, 60-74 MIN: ICD-10-PCS | Mod: S$GLB,,, | Performed by: INTERNAL MEDICINE

## 2022-02-14 PROCEDURE — 3008F BODY MASS INDEX DOCD: CPT | Mod: CPTII,S$GLB,, | Performed by: INTERNAL MEDICINE

## 2022-02-14 PROCEDURE — 1159F PR MEDICATION LIST DOCUMENTED IN MEDICAL RECORD: ICD-10-PCS | Mod: CPTII,S$GLB,, | Performed by: ORTHOPAEDIC SURGERY

## 2022-02-14 PROCEDURE — 1159F MED LIST DOCD IN RCRD: CPT | Mod: CPTII,S$GLB,, | Performed by: ORTHOPAEDIC SURGERY

## 2022-02-14 PROCEDURE — 3044F PR MOST RECENT HEMOGLOBIN A1C LEVEL <7.0%: ICD-10-PCS | Mod: CPTII,S$GLB,, | Performed by: INTERNAL MEDICINE

## 2022-02-14 PROCEDURE — 20550 TENDON SHEATH: ICD-10-PCS | Mod: RT,S$GLB,, | Performed by: ORTHOPAEDIC SURGERY

## 2022-02-14 PROCEDURE — 99999 PR PBB SHADOW E&M-EST. PATIENT-LVL III: CPT | Mod: PBBFAC,,, | Performed by: INTERNAL MEDICINE

## 2022-02-14 PROCEDURE — 99999 PR PBB SHADOW E&M-EST. PATIENT-LVL III: CPT | Mod: PBBFAC,,, | Performed by: ORTHOPAEDIC SURGERY

## 2022-02-14 PROCEDURE — 3078F DIAST BP <80 MM HG: CPT | Mod: CPTII,S$GLB,, | Performed by: INTERNAL MEDICINE

## 2022-02-14 RX ORDER — TRIAMCINOLONE ACETONIDE 40 MG/ML
40 INJECTION, SUSPENSION INTRA-ARTICULAR; INTRAMUSCULAR
Status: DISCONTINUED | OUTPATIENT
Start: 2022-02-14 | End: 2022-02-14 | Stop reason: HOSPADM

## 2022-02-14 RX ADMIN — TRIAMCINOLONE ACETONIDE 40 MG: 40 INJECTION, SUSPENSION INTRA-ARTICULAR; INTRAMUSCULAR at 11:02

## 2022-02-14 NOTE — PROGRESS NOTES
Ms. Espinoza returns to clinic today.  Has a history of right wrist de Quervain tenosynovitis.  She was unable to get an injection at her last visit because she had a skin cancer removed.  She states that she did develop a small superficial infection from that skin cancer excision but is now cleared.  She states that her wound is now completely healed.  She is here today to possibly have the injection for her right wrist    Physical exam:  Examination the right wrist and hand reveals that there is mild edema of the 1st extensor compartment.  Palpation over the radial styloid or 1st extensor compartment does produce tenderness.  She does have a positive Finkelstein's test.  She has a 2+ radial pulse and sensation is intact    Assessment:  Right wrist de Quervain tenosynovitis    Plan:    1. After informed consent was obtained injection was placed the right wrist 1st extensor compartment.  The patient tolerated that well    2. Follow up with me on a p.r.n. basis

## 2022-02-14 NOTE — PROCEDURES
Tendon Sheath    Date/Time: 2/14/2022 11:20 AM  Performed by: Hai Alaniz MD  Authorized by: Hai Alaniz MD     Consent Done?:  Yes (Verbal)  Indications:  Pain  Site marked: the procedure site was marked    Timeout: prior to procedure the correct patient, procedure, and site was verified    Prep: patient was prepped and draped in usual sterile fashion      Local anesthesia used?: Yes    Local anesthetic:  Lidocaine 1% without epinephrine  Anesthetic total (ml):  0.5    Location:  Wrist  Site:  R first doral compartment  Medications:  40 mg triamcinolone acetonide 40 mg/mL  Patient tolerance:  Patient tolerated the procedure well with no immediate complications

## 2022-02-14 NOTE — Clinical Note
Levi Quintanilla, Would you consider her cerebral aneurysm a contra-indication to anticoagulation long term? If you think we should have her see Neurosurgery to decide, let me know. Thanks

## 2022-02-16 ENCOUNTER — OFFICE VISIT (OUTPATIENT)
Dept: NEUROSURGERY | Facility: CLINIC | Age: 62
End: 2022-02-16
Payer: COMMERCIAL

## 2022-02-16 VITALS
HEIGHT: 69 IN | WEIGHT: 215.19 LBS | HEART RATE: 58 BPM | BODY MASS INDEX: 31.87 KG/M2 | SYSTOLIC BLOOD PRESSURE: 127 MMHG | DIASTOLIC BLOOD PRESSURE: 72 MMHG | RESPIRATION RATE: 18 BRPM

## 2022-02-16 DIAGNOSIS — I67.1 CEREBRAL ANEURYSM: ICD-10-CM

## 2022-02-16 PROCEDURE — 3078F PR MOST RECENT DIASTOLIC BLOOD PRESSURE < 80 MM HG: ICD-10-PCS | Mod: CPTII,S$GLB,, | Performed by: NURSE PRACTITIONER

## 2022-02-16 PROCEDURE — 3044F HG A1C LEVEL LT 7.0%: CPT | Mod: CPTII,S$GLB,, | Performed by: NURSE PRACTITIONER

## 2022-02-16 PROCEDURE — 1159F PR MEDICATION LIST DOCUMENTED IN MEDICAL RECORD: ICD-10-PCS | Mod: CPTII,S$GLB,, | Performed by: NURSE PRACTITIONER

## 2022-02-16 PROCEDURE — 3008F BODY MASS INDEX DOCD: CPT | Mod: CPTII,S$GLB,, | Performed by: NURSE PRACTITIONER

## 2022-02-16 PROCEDURE — 99204 OFFICE O/P NEW MOD 45 MIN: CPT | Mod: S$GLB,,, | Performed by: NURSE PRACTITIONER

## 2022-02-16 PROCEDURE — 3044F PR MOST RECENT HEMOGLOBIN A1C LEVEL <7.0%: ICD-10-PCS | Mod: CPTII,S$GLB,, | Performed by: NURSE PRACTITIONER

## 2022-02-16 PROCEDURE — 3074F SYST BP LT 130 MM HG: CPT | Mod: CPTII,S$GLB,, | Performed by: NURSE PRACTITIONER

## 2022-02-16 PROCEDURE — 99204 PR OFFICE/OUTPT VISIT, NEW, LEVL IV, 45-59 MIN: ICD-10-PCS | Mod: S$GLB,,, | Performed by: NURSE PRACTITIONER

## 2022-02-16 PROCEDURE — 3008F PR BODY MASS INDEX (BMI) DOCUMENTED: ICD-10-PCS | Mod: CPTII,S$GLB,, | Performed by: NURSE PRACTITIONER

## 2022-02-16 PROCEDURE — 3074F PR MOST RECENT SYSTOLIC BLOOD PRESSURE < 130 MM HG: ICD-10-PCS | Mod: CPTII,S$GLB,, | Performed by: NURSE PRACTITIONER

## 2022-02-16 PROCEDURE — 3078F DIAST BP <80 MM HG: CPT | Mod: CPTII,S$GLB,, | Performed by: NURSE PRACTITIONER

## 2022-02-16 PROCEDURE — 1159F MED LIST DOCD IN RCRD: CPT | Mod: CPTII,S$GLB,, | Performed by: NURSE PRACTITIONER

## 2022-02-16 NOTE — PROGRESS NOTES
Neurosurgery History & Physical    Patient ID: Tanya Espinoza is a 62 y.o. female.    Chief Complaint   Patient presents with    Cerebral aneurysm     Cerebral aneurysm x 8 mos. L sided facial paralysis as well as L side of head.  Balance issues.  Memory loss, confusion and tends to get aggravated when she is confused. BL leg numbness.  Vision worsening, with blurriness. Daily headaches.  Taste and smells make her nauseous.        History of Present Illness:   Ms. Espinoza is a 62 year old female with atrial fibrillation s/p ablation, CAD, HTN, HLD, GERD, ADHD, anxiety, known cerebral aneurysm who presents today for evaluation and review of recent cranial imaging. She has various symptoms that she deals with daily that interfere with her daily life. She reports having short term memory loss and confusion. She was recently taken off of her adderall and contributes the symptoms to this. She is also undergoing memory workup with Neurology on the [facility].    She would like to be take the medication again and was cleared by cardiology to take this but needs another evaluation for necessity. She is requesting a referral to Psych for workup.     She reports issues with left sided facial and extremity numbness about one month ago. She was evaluated in the ED. Workup negative for acute process and was recommended to follow up with Neurology as an outpatient for further discussion.    She has daily headaches. She recently fell with subsequent head trauma. She specifically reports hitting her nose. Since this time, she has lost some of her sense of smell and many foods taste differently. She was told by a physician that this would return but take time.     She had a cerebral angiogram 6 months ago. It was recommended she have 6 month followup with noninvasive imaging. She is still smoking but is down to only a few cigarettes daily. Her bp is controlled, 127/72 in clinic today.     Review of Systems   Constitutional:  Negative for activity change and fever.   HENT: Negative for trouble swallowing.    Eyes: Negative for visual disturbance.   Respiratory: Negative for cough.    Cardiovascular: Negative for chest pain.   Gastrointestinal: Negative for nausea.   Genitourinary: Negative for difficulty urinating.   Musculoskeletal: Negative for gait problem.   Skin: Negative for wound.   Neurological: Positive for numbness and headaches. Negative for weakness.   Psychiatric/Behavioral: Positive for confusion.       Past Medical History:   Diagnosis Date    ADHD (attention deficit hyperactivity disorder)     Anemia     Anticoagulant long-term use     Anxiety     Coronary artery disease     CAD    Elevated LFTs     elevated enzymes    GERD (gastroesophageal reflux disease) 4/24/2021    Hyperlipemia 4/24/2021    Hypertension     LBBB (left bundle branch block)     Lower extremity edema     Major depressive disorder, recurrent episode, mild 1/3/2022    Obesity     Paroxysmal A-fib     Skin cancer     skin ca     Social History     Socioeconomic History    Marital status:    Occupational History    Occupation: [employer]   Tobacco Use    Smoking status: Current Every Day Smoker     Packs/day: 1.00     Years: 40.00     Pack years: 40.00     Types: Cigarettes    Smokeless tobacco: Never Used    Tobacco comment: down to 0.25 pack of  cigarettes per day   Substance and Sexual Activity    Alcohol use: Not Currently    Drug use: No     Family History   Problem Relation Age of Onset    Diabetes Mother     Osteoporosis Maternal Grandmother     Pancreatic cancer Brother      Review of patient's allergies indicates:   Allergen Reactions    Effexor [venlafaxine] Other (See Comments)     Bruising all over body and elevated blood pressure       Current Outpatient Medications:     apixaban (ELIQUIS) 5 mg Tab, Take 5 mg by mouth 2 (two) times a day., Disp: , Rfl:     bisoprolol (ZEBETA) 5 MG  "tablet, Take 1 tablet (5 mg total) by mouth once daily., Disp: 30 tablet, Rfl: 1    flecainide (TAMBOCOR) 100 MG Tab, Take 100 mg by mouth every 12 (twelve) hours., Disp: , Rfl:     gabapentin (NEURONTIN) 300 MG capsule, Take 1 capsule (300 mg total) by mouth 3 (three) times daily., Disp: 90 capsule, Rfl: 11    multivitamin with minerals tablet, Take 1 tablet by mouth once daily., Disp: , Rfl:     nitroGLYCERIN (NITROSTAT) 0.4 MG SL tablet, Place 1 tablet (0.4 mg total) under the tongue every 5 (five) minutes as needed for Chest pain., Disp: 30 tablet, Rfl: 1    pantoprazole (PROTONIX) 40 MG tablet, Take 1 tablet (40 mg total) by mouth once daily., Disp: 90 tablet, Rfl: 2    ramelteon (ROZEREM) 8 mg tablet, Take 1 tablet (8 mg total) by mouth every evening., Disp: 30 tablet, Rfl: 3    rosuvastatin (CRESTOR) 40 MG Tab, Take 1 tablet (40 mg total) by mouth every evening., Disp: 90 tablet, Rfl: 3    aspirin (ECOTRIN) 81 MG EC tablet, Take 1 tablet (81 mg total) by mouth once daily. (Patient not taking: Reported on 2/16/2022), Disp: 90 tablet, Rfl: 1  Blood pressure 127/72, pulse (!) 58, resp. rate 18, height 5' 8.5" (1.74 m), weight 97.6 kg (215 lb 2.7 oz), last menstrual period 10/28/2016.      Neurologic Exam     Mental Status   Oriented to person, place, and time.   Level of consciousness: alert    Cranial Nerves   Cranial nerves II through XII intact.     CN III, IV, VI   Pupils are equal, round, and reactive to light.    Motor Exam   Muscle bulk: normal  Overall muscle tone: normal    Strength   Strength 5/5 except as noted.   4/5 left arm     Sensory Exam   Light touch normal.     Gait, Coordination, and Reflexes     Gait  Gait: normal      Physical Exam  Vitals and nursing note reviewed.   Constitutional:       Appearance: She is well-developed.   HENT:      Head: Normocephalic and atraumatic.   Eyes:      Pupils: Pupils are equal, round, and reactive to light.   Pulmonary:      Effort: Pulmonary effort " is normal.   Skin:     General: Skin is warm and dry.   Neurological:      Mental Status: She is alert and oriented to person, place, and time.      Gait: Gait is intact.         Imaging:   CTA brain dated 1/2/22 personally reviewed and discussed with the patient.   FINDINGS:  INTRACRANIAL: No acute intracranial hemorrhage.  No hydrocephalus.  No intracranial mass effect.  No abnormal intracranial enhancement.     SINUSES: Visualized paranasal sinuses and mastoids are clear.     SKULL/SCALP: Visualized osseous structures are normal.     ORBITS: Visualized orbits are normal.     VASCULATURE: No proximal branch occlusion or severe focal stenosis in the major intracranial cerebral arteries.  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  Similar in size compared to MRI from 05/14/2021. No vascular malformation.  Deep cerebral veins and dural venous sinuses enhance normally.     Impression:     [facility] concordant.  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       Assessment & Plan:   1. Cerebral aneurysm  Ambulatory referral/consult to Neurosurgery       62 year old with basilar tip aneurysm, overall stable when compared to prior imaging. The patient and imaging was reviewed with Dr. Dallas. Recommend 6 month follow up with repeat CT angiogram to evaluate stability of the aneurysm. She should follow up with Neurology for other neurologic symptoms as these are unlikely related to aneurysm. We discussed importance of smoking cessation and blood pressure control. She is agreeable. Will see her back in clinic after imaging in 6 months.

## 2022-02-18 ENCOUNTER — TELEPHONE (OUTPATIENT)
Dept: NEUROLOGY | Facility: CLINIC | Age: 62
End: 2022-02-18
Payer: COMMERCIAL

## 2022-02-18 NOTE — TELEPHONE ENCOUNTER
----- Message from Jt Francisco MD sent at 2/18/2022 11:12 AM CST -----  Levi Dumont,  Please schedule the patient for a video follow-up to discuss test results and care management. Make sure they have completed EKG before appt.  Jt Watkins

## 2022-02-18 NOTE — TELEPHONE ENCOUNTER
Called and scheduled patient for a follow up visit to discuss test results with Dr. Francisco. Patient appointment scheduled for 2/22.

## 2022-02-22 ENCOUNTER — TELEPHONE (OUTPATIENT)
Dept: NEUROLOGY | Facility: CLINIC | Age: 62
End: 2022-02-22
Payer: COMMERCIAL

## 2022-02-22 ENCOUNTER — OFFICE VISIT (OUTPATIENT)
Dept: NEUROLOGY | Facility: CLINIC | Age: 62
End: 2022-02-22
Payer: COMMERCIAL

## 2022-02-22 DIAGNOSIS — R43.2 DYSGEUSIA: ICD-10-CM

## 2022-02-22 DIAGNOSIS — G43.109 BASILAR MIGRAINE: ICD-10-CM

## 2022-02-22 DIAGNOSIS — H93.19 TINNITUS, UNSPECIFIED LATERALITY: ICD-10-CM

## 2022-02-22 DIAGNOSIS — F07.81 POST CONCUSSION SYNDROME: ICD-10-CM

## 2022-02-22 DIAGNOSIS — F90.1 ATTENTION DEFICIT HYPERACTIVITY DISORDER (ADHD), PREDOMINANTLY HYPERACTIVE TYPE: ICD-10-CM

## 2022-02-22 DIAGNOSIS — I63.9 INTRACTABLE PERSISTENT MIGRAINE AURA WITH CEREBRAL INFARCTION AND STATUS MIGRAINOSUS: ICD-10-CM

## 2022-02-22 DIAGNOSIS — G31.84 MCI (MILD COGNITIVE IMPAIRMENT): Primary | ICD-10-CM

## 2022-02-22 DIAGNOSIS — G43.611 INTRACTABLE PERSISTENT MIGRAINE AURA WITH CEREBRAL INFARCTION AND STATUS MIGRAINOSUS: ICD-10-CM

## 2022-02-22 PROCEDURE — 3044F HG A1C LEVEL LT 7.0%: CPT | Mod: CPTII,95,, | Performed by: PSYCHIATRY & NEUROLOGY

## 2022-02-22 PROCEDURE — 3044F PR MOST RECENT HEMOGLOBIN A1C LEVEL <7.0%: ICD-10-PCS | Mod: CPTII,95,, | Performed by: PSYCHIATRY & NEUROLOGY

## 2022-02-22 PROCEDURE — 1159F MED LIST DOCD IN RCRD: CPT | Mod: CPTII,95,, | Performed by: PSYCHIATRY & NEUROLOGY

## 2022-02-22 PROCEDURE — 96116 NUBHVL XM PHYS/QHP 1ST HR: CPT | Mod: 95,59,, | Performed by: PSYCHIATRY & NEUROLOGY

## 2022-02-22 PROCEDURE — 1160F PR REVIEW ALL MEDS BY PRESCRIBER/CLIN PHARMACIST DOCUMENTED: ICD-10-PCS | Mod: CPTII,95,, | Performed by: PSYCHIATRY & NEUROLOGY

## 2022-02-22 PROCEDURE — 99215 PR OFFICE/OUTPT VISIT, EST, LEVL V, 40-54 MIN: ICD-10-PCS | Mod: 95,,, | Performed by: PSYCHIATRY & NEUROLOGY

## 2022-02-22 PROCEDURE — 1160F RVW MEDS BY RX/DR IN RCRD: CPT | Mod: CPTII,95,, | Performed by: PSYCHIATRY & NEUROLOGY

## 2022-02-22 PROCEDURE — 1159F PR MEDICATION LIST DOCUMENTED IN MEDICAL RECORD: ICD-10-PCS | Mod: CPTII,95,, | Performed by: PSYCHIATRY & NEUROLOGY

## 2022-02-22 PROCEDURE — 96116 PR NEUROBEHAVIORAL STATUS EXAM BY PSYCH/PHYS: ICD-10-PCS | Mod: 95,59,, | Performed by: PSYCHIATRY & NEUROLOGY

## 2022-02-22 PROCEDURE — 99215 OFFICE O/P EST HI 40 MIN: CPT | Mod: 95,,, | Performed by: PSYCHIATRY & NEUROLOGY

## 2022-02-22 RX ORDER — HEPARIN 100 UNIT/ML
500 SYRINGE INTRAVENOUS
OUTPATIENT
Start: 2022-02-22

## 2022-02-22 RX ORDER — ONDANSETRON 2 MG/ML
8 INJECTION INTRAMUSCULAR; INTRAVENOUS ONCE AS NEEDED
OUTPATIENT
Start: 2022-02-22

## 2022-02-22 RX ORDER — DIPHENHYDRAMINE HYDROCHLORIDE 50 MG/ML
25 INJECTION INTRAMUSCULAR; INTRAVENOUS ONCE AS NEEDED
OUTPATIENT
Start: 2022-02-22

## 2022-02-22 RX ORDER — SODIUM CHLORIDE 9 MG/ML
INJECTION, SOLUTION INTRAVENOUS CONTINUOUS
OUTPATIENT
Start: 2022-02-22

## 2022-02-22 RX ORDER — SODIUM CHLORIDE 0.9 % (FLUSH) 0.9 %
10 SYRINGE (ML) INJECTION
OUTPATIENT
Start: 2022-02-22

## 2022-02-22 NOTE — TELEPHONE ENCOUNTER
Dr. Francisco requesting that 3-5 day infusion to break headache cycle ordered for [facility]. Therapy plan entered and Mickey Santos with infusion center notified who will submit for authorization. Once approved, patient can be scheduled for infusions.

## 2022-02-22 NOTE — PROGRESS NOTES
[facility]  [facility]     PATIENT: Tanya Espinoza  VISIT DATE: 2022  MRN: [id number]  PRIMARY PROVIDER: Wendy Rodriguez DO  : 1960       Chief complaint: Progressive Cognitive Impairment     History of present illness:      Ms. Espinoza is a 62-year-old right-handed female who presents today to the [facility] due to concerns related to progressive neurocognitive impairment.    Ms. Espinoza is accompanied by the  who participates in providing history.   Additional information is obtained by reviewing available medical records.     Relevant Background/Context   Known Relevant Genetics:  o There is no known relevant genetic testing available.   Known Relevant Family history:  o No Known Relevant Family History.  o The family denies a history of early/late onset cognitive impairment.  o The family denies a history of movement disorder (PD, PDD, tremor, etc).  o The family denies a history of motor neuron disease (ALS).  o The family denies a history of developmental learning disorder (Dyslexia, ADHD, ASD, etc.).  o The family denies a history of mood/substance abuse disorder (MDD, GUANAKITO, Schizophrenia, etc.).   Developmental/Milestones:  o The patient/family report no known birth complications or early life problems. The patient met all developmental milestones.   Learning Disorders:  o The patient/family report no signs or symptoms suggestive of developmental learning disorder.   Education:  o 12 years of formal education.  o HS.   Social History:  o She is a smoker and we addressed smoking cessation today.   Relevant Medical History:  o ADHD (attention deficit hyperactivity disorder)  o Anticoagulant long-term use  o Anxiety  o Coronary artery disease  o Elevated LFTs  o GERD (gastroesophageal reflux disease) 2021  o Hyperlipemia 2021  o Hypertension  o LBBB (left bundle branch block)  o Major  depressive disorder, recurrent episode, mild 1/3/2022  o Obesity  o Paroxysmal A-fib   Relevant Exposure/Trauma to CNS:  o Traumatic Brain Injury or Concussions - One episode of TBI with post concussive symptoms in early January 2022  o No History of Chronic Mood Disorder  o No History of Chronic Stress  o No History of Chronic Substance Abuse  o No History of Malnutrition  o No History of Toxic Exposure     Neurocognitive Disorder Features   Onset/Duration:  o May 2021 (~9-month)   First Symptom:  o Memory impairment   Progression:  o Step-wise Progressive   Clinical Course:  o Psychiatrist (03/05/2013)  - Type: Chart Review. She has got a. History of adult ADHD and depression. She says she has been off of her. Medications now for 13 years. 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 Inattention and unable to finish task.  o Psychiatrist (07/05/2017)  - Type: Chart Review. She has ADD dx 20 years ago. She was on treatment for a while when dx then came off of treatment for many years. Four years ago she restarted. She was originally dx through psychiatry. She says before treatment she was very distractible and would often forget to pay bills. She would procrastinate and had trouble with her memory because her mind would go so fast. She often missed appointments and did not finish work. It was interfering in her job. She currently works to [employer] and [employer]. This is physical type of work. She works all hours of the day and usually 6 days a week. She takes her long acting adderrall at 6:30 am and then her short acting dose of 11 am. She takes everyday day. She denies any side effects and adds that it has helped her to be so much more productive and organized. No evidence of abuse by . Her last fill was 6/15/17 #30 on both adderrall XR and adderall short acting. Rx given today with stop date of 8/13/17. Good control  "on her reigmen and has been taking for the last 4 years. Will refer to [facility] to manage. No side effects.  o Neurologist (01/11/2022)  - Type: Chart Review. The patient STATES THAT SHE HAS BEEN HAVING RACING HEART ,S/P FALL,FACIAL PARESTHESIA, HUSBANDS STATES GETS CONFUSED, ANEUERYSM 3. 5 CM, AGGRAVATED WITH NO APPT WITH NEURO AT [facility],STILL SMOKES,SAW NEURORADIOLOGY NO COILING, LAST RFA PER DR COLE 6 MO AGO, SEE ROS.  o Neurologist (01/18/2022)  - Type: Chart Review. Presents with multiple complaints. She is accompanied by her  who assists with the history. She is having episodic L facial numbness followed by arm heaviness. Followed by headache. + photo/phonophobia, + nausea. Mother with migraine. Abdominal pain as a child. She fell a few weeks ago and has had worse headaches that are daily with associated neck stiffness. She reports memory trouble x 6 months: forgetfulness (leaves water on, forgets to brush hair). Poor sleep. + anxiety. ADHD. Stopped Adderall recently since CAD. Symptoms worse since. Tinnitus x few months. Few weeks of change in taste and laundry detergent smelled different. Started topiramate before taste change. Gait trouble: Occasional "foot crosses the other" since she stopped Adderall. 1. Cognitive disturbance: exam suggests attention/concentration deficit; suspect exacerbation due to discontinuation of Adderall; insomnia/anxiety likely contributing.  o [facility] - Jt Francisco MD. Neurologist (02/10/2022)  - Type: Chart Review. The patient presents with her  reporting a 1 year history of non bothersome memory deficits and brain fog that have acutely worsened within the last month and a half. The patient reports a lifetime history of ADHD like symptoms that have interfered with her ability to perform at school. The patient will be diagnosed with ADHD in her mid 20s would briefly be on Adderall before stopping during pregnancy. The patient would restart " again in her 30s however given that her partner at the time was a drug abuser, she elected to stop taking the medication to prevent it being stolen. The patient restart Adderall in her 40s and has been subsequently on the medication up until the last year. Over last 3 years the patient has had 2 cardiac ablations for chronic atrial fibrillation. The last 1 was in late 2020. In May 2021 the patient had new onset left facial numbness and weakness thought to be a stroke. The patient was evaluated [facility]. The patient was found to have a presumably incidental basilar tip aneurysm without evidence of acute ischemic stroke. The patient was scheduled for annual follow-up. Within the last calendar year the patient has had frequent recurrent episodes of left facial numbness and headache. Headaches are relatively frequent however will continue to have left facial numbness with and without headaches. The patient has been managed on venlafaxine Topamax and Nurtec without significant symptomatic benefit. In July 2021 the patient contracted COVID. The patient has subsequently mild brain fog and memory deficits following this exposure alongside anosmia since July 2021. In late 2021 it was recommended to her by her cardiologist to stop Adderall given her chronic atrial fibrillation despite to ablation. The patient discontinued Adderall after Christmas 2021. Within the week following this the patient reported increase in her in attention/ concentration deficits that were bothersome. In January 2, 2022 the patient had a no other symptomatic episode of left facial weakness. This prior to [facility] for evaluation. MRI done showed no evidence of acute ischemic stroke. The patient was subsequently discharged for outpatient management. Within less than 7 days of this event the patient had a ground level fall. The patient has no recollection of this fall. The patient does not recollect whether not she passed out or tripped. The  "patient does not know if she was unconscious following this fall. The patient did not protect herself upon this fall. The patient landed on her face breaking her nose in damaging her teeth. Following this ground level fall with facial trauma for which she did not seek out medical attention the patient reports new onset dysgeusia. She now reports everything "smells like garbage". This is reportedly resulted in a decrease in po Intake. The patient is unable to tolerate any food due to smell. She is only able to eat relatively dull foods and flavorless liquids and feels like she has lost weight over the last 2 weeks. During the same time frame the patient and her family report acute worsening in concentration and memory deficits. The patient reports having difficulty focusing on anything at work. The patient will often walk around the store almost and aimless state not knowing which she is doing. This is reportedly resulted in increasing distress and irritability. The patient reports sometimes driving through red lights while driving home. She feels like she has had a complete breakdown and is near crying during the evaluation. The patient reports being told that there are other medications other than Adderall for ADHD. The patient is not following up with her physician group that was already prescribing her ADHD medication for unknown/unclear reasons. In the setting of all these issues the patient reports a background insomnia that does not appear to be acutely worsened. The patient typically has difficulty sleeping for any more than 3 hours at a time often awakening and doing some from the room. This is been a problem for many years. The observations made above were discussed with the patient and her family. The patient has a lifetime history of developmental ADHD with variable compliance with Adderall in her 20s, 30s, and has been subsequently consistent with this this her 40s. The patient reports new brain fog " prior since beginning either after COVID in July 2021 or stroke-like episode in May 2021. In the months subsequently thereafter the patient had persistent brain fog there was otherwise not bothersome. Cardiology recently recommended the patient discontinue Adderall given 2 cardiac ablations. The patient volitionally discontinued Adderall following Christmas 2021. In the week subsequently thereafter the patient reported new worsening attention/ concentration deficits. The patient was briefly admitted to local hospital for stroke-like episode described as left facial droop. Less than 1 week following his hospitalization the patient had a ground level fall with head trauma. Subsequently thereafter patient's anosmia has turned into dysgeusia with worsening this executive dysfunction out of proportion to simple ADHD medication withdrawal. At this time the patient likely has multifactorial post concussive syndrome including but not limited to long COVID, recent TBI, Adderall medication withdrawal, and potential stroke. Recommend repeating MRI brain stat without contrast and acquiring  screening labs for reversible cause of cognitive.     Current Presentation   Recent/Interim History:  o Since last time seen, the patient reports a mild improvement in cognition and sleep. Since starting ramelteon 8 mg the patient reports getting a solid 3-5 hours of sleep however does still continue to wake up at night having difficulty going back to sleep. The patient reports racing thoughts and difficulty going back to sleep upon awakening. In the setting of improved sleep the patient reports mild improvement in daytime fogginess however still reports bothersome daytime fatigue and inattention. On presentation today we reviewed patient's recent brain imaging and serum laboratories. Serum laboratories are otherwise unrevealing. MRI brain shows no new pathology. We reviewed patient's brain imaging compared to January 2022 and there is  concerning evidence of a left pontine hyperintensity. This fits well patient's current episodic left facial sensory and motor disturbances. It is unclear whether not this is due to status migrainous or prior stroke. In the setting of a basilar tip aneurysm this does raise the concern of a vascular mediated migraine/ a cephalic presentation with possible ischemia. We discussed these findings with the patient. We will discuss these findings with headache medicine for further management. We will refer to headache medicine for management. The patient is currently taking gabapentin 3 mg twice a day. Recommend increasing this to 300 mg at night with p. R. N. Gabapentin of the patient wakes up. The patient reports being recently evaluated by Cardiology. The patient reports that her atrial fibrillation is gone and that the plan to discontinue the Eliquis and flecainide.   Unresolved Concern(s) reported by patient/family:  o TBI/PCS - gradually improving  o ADHD with withdrawal of medication  o Insomnia - improved on ramelteon        Review of cognitive, visuospatial, motor, sensory, and behavioral systems:     Memory:    Ms. Balderas memory has worsened in the past few years.   She does repeat statements or asks the same question repeatedly.   She does have difficulty remembering recent important conversations.   She does have difficulty remembering recent events.   She does not forget information within minutes.   Her remote memory is intact.   Her recent retrograde memory is impaired.  Attention:    Her attention and concentration are impaired.   She does not have attentional fluctuations.   She does have difficulty with selective attention.   She does become easily distracted.   She does have difficulty with divided attention.  Executive:    Ms. Espinosas cognitive processing speed is slower.   She does have difficulty with working memory.   She does misplace personal items (e.g., keys, cell phone, wallet)  more frequently.   She does have difficulty keeping track of her medications.   She does not have difficulty with planning/organizing/completing multistep tasks.   She does not have difficulty with executive attention.   She does have difficulty with flexible thinking.   She does not difficulty with self control.   She is exhibiting new symptoms that suggest they have become more impulsive, rash and/or careless.   Her judgment is impaired.  Language:    Her speech output is unaffected.   She does forget people's names more frequently.   She does have word-finding difficulties.   Ms. Espinosas speech is fluent and non-effortful.   Ms. Balderas speech is grammatically intact.   She does not make word substitutions.   She does not have difficulty reading.   She does not appear to have impaired comprehension.  Visuospatial:    She has new visuospatial problems.   She does not become confused or disoriented in *new*, unfamiliar places.   She does not have trouble with navigation.   She does not get lost in familiar places.   Ms. Espinoza does not have visuospatial disorientation.   She does not have difficulty recognizing objects or faces.   She denies problems with driving or parking.  Motor/Coordination:    Ms. Espinoaz does have difficulty with walking.   She does feel imbalanced.   She has fallen.   She does not appear to have new muscle weakness.   She does not have difficulty buttoning shirts, operating zippers, or manipulating tools/utensils.   Her handwriting has not become micrographic.   She does not have a resting tremor.   She denies having any new involuntary movements and/or muscle jerking.   She does not have swallowing difficulty.   She denies new muscle cramps and twitching.  Sensory:    Ms. Espinoza denies new numbness, tingling, paresthesias, or pain.   Ms. Espinoza denies a loss of vision, blurry vision, or double vision.   Ms. Espinoza denies new loss of hearing or worsening  tinnitus.   Ms. Espinoza does have anosmia.  Sleep:    Ms. Espinoza reports difficulty sleeping.   Ms. Espinoza does have difficulty going to sleep.   Ms. Espinoza reports difficulty staying asleep and/or frequently awakening at night.   Ms. Espinoza does not snore or have witnessed apneas while sleeping.   When she wakes up in the morning, she does not feel well-rested.   She denies dream-enactment behavior.   She denies symptoms suggestive of restless leg syndrome.  Behavior:    Ms. Espinosas personality has changed.   She does not have symptoms of disinhibition and social inappropriateness.   She does not have symptoms to suggest a loss of manners or decorum.   She does not appear apathetic or has decreased motivation.   She does not appear to have a change in inertia.   Ms. Espinosas emotional expression has changed.   She does not have emotional blunting or lability.   She does have symptoms of irritability and mood lability.   She has been reported to have new symptoms of agitation, aggression, or violent outbursts.   Her insight into his health and situation is intact.   Her personal hygiene is intact.   She is not exhibiting a diminished response to other people's needs and feelings?   She is not exhibiting a diminished social interest, interrelatedness, or personal warmth.   She denies restlessness.   She denies new and/or worsening simple repetitive behaviors.   Her speech has not become simplified or become repetitive/stereotyped.   She denies new/worsening complex repetitive/ritualistic compulsions and behaviors.   She does not have symptoms of hyper-religiosity or dogmatism.   Her interests/pleasures have not become restrictive, simplified, interrupting, or repetitive.   She denies a change of self-stimulating behavior.   She denies any changes in eating behavior.   She denies increased consumption of food or substances.   She denies oral exploration or consumption of inedible  objects.  Psychiatric:    She does feel depressed.   She is exhibiting symptoms of social withdrawal/indifference.   She does have anxiety.   She does not exhibit cycling behavior.   She does not exhibit hyperactive behavior.   She is not exhibited symptoms of paranoia.   She does not have delusions.   She does not have hallucinations.   She does not have a history of sensitivity to neuroleptic/psychotropic medications.  Medical Review of Systems:    Ms. Espinoza does not have constipation.   Ms. Espinoza does not have urinary incontinence.   Ms. Espinoza denies orthostatic lightheadedness.   Ms. Espinoza's weight is unstable. Comment: weight loss  Functional status:   Difficulty performing the following Instrumental ADLs:  o Housekeeping: No  o Food Preparation: No  o Shopping: No  o Ability to Handle Finances: No  o Transportation/Driving: No  o Household Appliances/Stove: No  o Laundry: No   Difficulty performing the following Basic ADLs:  o Dressing: No  o Bathing: No  o Toileting: No  o Personal hygiene and grooming: No  o Feeding: No  Care Management:   Patient/Family Safety Concerns:  o Medication Adherence: No  o Home Safety: No  o Wandered: No  o Firearms: No  o Fall Risk: No  o Home Alone: No       Past Medical History:   Diagnosis Date    ADHD (attention deficit hyperactivity disorder)     Anemia     Anticoagulant long-term use     Anxiety     Coronary artery disease     CAD    Elevated LFTs     elevated enzymes    GERD (gastroesophageal reflux disease) 4/24/2021    Hyperlipemia 4/24/2021    Hypertension     LBBB (left bundle branch block)     Lower extremity edema     Major depressive disorder, recurrent episode, mild 1/3/2022    Obesity     Paroxysmal A-fib     Skin cancer     skin ca       Past Surgical History:   Procedure Laterality Date    ABLATION  04/25/2021    to heart for a-fib x 2    CORONARY ANGIOGRAPHY N/A 5/6/2021    Procedure: ANGIOGRAM, CORONARY ARTERY;  Surgeon: Jesus  WONG Sol MD;  Location: [facility];  Service: Cardiology;  Laterality: N/A;    LEFT HEART CATHETERIZATION N/A 5/6/2021    Procedure: Left heart cath;  Surgeon: Jesus Sol MD;  Location: [facility];  Service: Cardiology;  Laterality: N/A;    TONSILLECTOMY      TUBAL LIGATION         Family History   Problem Relation Age of Onset    Diabetes Mother     Osteoporosis Maternal Grandmother     Pancreatic cancer Brother        Social History     Socioeconomic History    Marital status:    Occupational History    Occupation: [employer] and [employer]   Tobacco Use    Smoking status: Current Every Day Smoker     Packs/day: 1.00     Years: 40.00     Pack years: 40.00     Types: Cigarettes    Smokeless tobacco: Never Used    Tobacco comment: down to 0.25 pack of  cigarettes per day   Substance and Sexual Activity    Alcohol use: Not Currently    Drug use: No       Medication:     Current Outpatient Medications on File Prior to Visit   Medication Sig Dispense Refill    apixaban (ELIQUIS) 5 mg Tab Take 5 mg by mouth 2 (two) times a day.      aspirin (ECOTRIN) 81 MG EC tablet Take 1 tablet (81 mg total) by mouth once daily. (Patient not taking: Reported on 2/16/2022) 90 tablet 1    bisoprolol (ZEBETA) 5 MG tablet Take 1 tablet (5 mg total) by mouth once daily. 30 tablet 1    flecainide (TAMBOCOR) 100 MG Tab Take 100 mg by mouth every 12 (twelve) hours.      gabapentin (NEURONTIN) 300 MG capsule Take 1 capsule (300 mg total) by mouth 3 (three) times daily. 90 capsule 11    multivitamin with minerals tablet Take 1 tablet by mouth once daily.      nitroGLYCERIN (NITROSTAT) 0.4 MG SL tablet Place 1 tablet (0.4 mg total) under the tongue every 5 (five) minutes as needed for Chest pain. 30 tablet 1    pantoprazole (PROTONIX) 40 MG tablet Take 1 tablet (40 mg total) by mouth once daily. 90 tablet 2    ramelteon (ROZEREM) 8 mg tablet Take 1 tablet (8 mg total) by mouth every evening. 30 tablet 3     rosuvastatin (CRESTOR) 40 MG Tab Take 1 tablet (40 mg total) by mouth every evening. 90 tablet 3     No current facility-administered medications on file prior to visit.        Review of patient's allergies indicates:   Allergen Reactions    Effexor [venlafaxine] Other (See Comments)     Bruising all over body and elevated blood pressure       Medications Reconciliation:   I have reconciled the patient's home medications and discharge medications with the patient/family. I have updated all changes.  Refer to After-Visit Medication List.    Objective:  Vital Signs:  There were no vitals filed for this visit.  Wt Readings from Last 3 Encounters:   02/16/22 1423 97.6 kg (215 lb 2.7 oz)   02/14/22 1159 97.6 kg (215 lb 2.7 oz)   02/14/22 1123 98.5 kg (217 lb 2.5 oz)     There is no height or weight on file to calculate BMI.     Neurological examination:      Mental Status:       Ms. Espinoza is awake; Her energy level appeared normal.   Her attention/concentration is impaired.    Her thought process is not logical or goal-oriented.    Comment: tangential and circumstantial thoughts   She has no evidence of hallucinations (auditory, visual, olfactory).   She has no evidence of delusions (paranoid, grandiose, bizarre).   She demonstrated impaired judgment based on actions and plans for the future.   She demonstrated appropriate insight based on actions, awareness of her illness, plans for the future.   Throughout the interview, she is cooperative, her eye contact is appropriate.  Cranial Nerves:    Her eyelid assessment showed no apraxia. There was no eyelid dysfunction, retraction, or marks sign.   Her facial sensation was intact to light touch bilaterally.   Her facial strength was normal.   Her facial expression was symmetric and appropriate to the context.   Her hearing was normal bilaterally.   Her tongue showed no evidence of scalloping.   She can protrude their tongue beyond Her lips for >10  sec.   She can move their extended tongue back and forth rapidly.   She had no significant evidence of anterocollis or retrocollis.  Speech/Language:    Ms. Balderas speech was fluent, non-effortful, and her rate was appropriate to the context.   Her speech volume is within normal range and appropriate to the context.   Her speech rate is normal.   Her respirations are within normal range and appropriate to context.   Her speech timbre is normal.   She has no articulation (segmental features) errors.  Motor:    Ms. Espinosas bilateral upper extremity muscle bulk is appropriate.     Assessment of motor strength was symmetric and at minimal anti-gravity.   There is no pronator or downward drift.   There is no upward drift.   There is no outward/diagonal drift.   There is no myoclonus observed in Ms. Espinoza's bilateral upper and lower extremities.   There are no fasciculations observed in Ms. Espinoza's bilateral upper and lower extremities.         Neuropsychological Evaluation Summary:     Prior Neurocognitive/Neuropsychological Evaluations   Summary from EMR:   2022-02-10:  o Executive predominant multidomain MCI  o Moderate Visuospatial Impairment: visuospatial construction.  o Moderate Executive Impairment: She scored >3 standard deviations below the norm on at least one measure. She had difficulty with semantic fluency, lexical fluency, semantic fluency.  o Moderate Language Impairment: abstract.  o Very Mild Memory Impairment: recall.  o Very Mild Attention Impairment: orientation.  o MMSE 28/30: MMSE Score suggestive of normal to questionable cognitive impairment.  o MOCA 23/30: MOCA Score suggestive of mild cognitive impairment.  o BEHAV5+ 4/6: See ROS section for a full description    Neurocognitive Evaluation completed on 02/22/2022:  Neuropsychiatric/Behavioral Focused Evaluation Assessment   BEHAV5+ 4/6 See ROS section for a full description   Laboratories:     Lab Date Value [Reference]    Coagulopathy Screening           aPTT 05/15/2021  32.0 [24.6 - 36.7 sec]  32.0 [24.6 - 36.7 sec]      Metabolic Screening   Free T4 2021, May-15    0.78 [0.78 - 2.19 ng/dL]      Hemoglobin A1C External 02/10/2022  5.3 [4.0 - 5.6 %]  5.3 [4.0 - 5.6 %]      Homocysteine 02/10/2022  6.8 [4.0 - 15.5 umol/L]      Methlymalonic Acid 01/18/20222022, Jan-18    0.40  0.29  0.29      Procalcitonin 02/10/2022  0.07  0.07      T4 Total 02/10/2022  6.7 [4.5 - 11.5 ug/dL]      TSH 05/15/2021  1.173 [0.400 - 4.000 uIU/mL]  2.115 [0.400 - 4.000 uIU/mL]  2.115 [0.400 - 4.000 uIU/mL]      Glucose 2022, Feb-10    122 (H) [70 - 110 mg/dL]      Albumin 2022, Feb-10  2022, Jan-02 2021, Jul-19    3.7 [3.5 - 5.2 g/dL]  4.1 [3.5 - 5.2 g/dL]  4.4 [3.5 - 5.2 g/dL]      Alkaline Phosphatase 2022, Feb-10  2022, Jan-02 2021, Jul-19    102 [55 - 135 U/L]  143 [55 - 135 U/L]  96 [55 - 135 U/L]      ALT 2022, Feb-10  2022, Jan-02 2021, Jul-19    29 [10 - 44 U/L]  153 (H) [10 - 44 U/L]  34 [10 - 44 U/L]      AST 2022, Feb-10  2022, Jan-02 2021, Jul-19    24 [10 - 40 U/L]  63 (H) [10 - 40 U/L]  48 (H) [10 - 40 U/L]      BILIRUBIN TOTAL 2022, Feb-10  2022, Jan-02 2021, Jul-19    0.6 [0.1 - 1.0 mg/dL]  0.4 [0.1 - 1.0 mg/dL]  0.7 [0.1 - 1.0 mg/dL]      PROTEIN TOTAL 2022, Feb-10  2022, Jan-02 2021, Jul-19    7.9 [6.0 - 8.4 g/dL]  7.5 [6.0 - 8.4 g/dL]  7.9 [6.0 - 8.4 g/dL]      Cholesterol 2022, Feb-10    172 [120 - 199 mg/dL]      HDL 2022, Feb-10    67 [40 - 75 mg/dL]      Non-HDL Cholesterol 2022, Feb-10    105 [mg/dL]      Triglycerides 02/10/2022  156 (H) [30 - 150 mg/dL]      B12 Def. Methylmalonic Acid 02/10/2022  0.38      Folate 02/10/2022  19.0 [4.0 - 24.0 ng/mL]  11.9 [4.0 - 24.0 ng/mL]      Thiamine 02/10/2022  102 [38 - 122 ug/L]      Vitamin B-12 02/10/2022  324 [180 - 914 ng/L]  545 [210 - 950 pg/mL]  545 [180 - 914 ng/L]      Neuroendocrine/Electrolyte Screening   Magnesium 05/15/97518552, May-15    2.1 [1.6 - 2.6 mg/dL]  2.2 [1.6  - 2.6 mg/dL]      Phosphorus 2021, May-15    3.8 [2.7 - 4.5 mg/dL]      BUN 2022, Feb-10    24 (H) [6 - 20 mg/dL]      Chloride 2022, Feb-10    107 [95 - 110 mmol/L]      Creatinine 2022, Feb-10    1.0 [0.5 - 1.4 mg/dL]      Potassium 2022, Feb-10    3.6 [3.5 - 5.1 mmol/L]      Sodium 2022, Feb-10    141 [136 - 145 mmol/L]      Infectious Disease/Immunocompromised Screening   SARS-CoV-2 RNA, Amplification, Qual 02/10/2022  Positive (A)      HIV 1/2 Ag/Ab 02/10/2022  Negative      Syphilis Treponemal Ab 2022, Feb-10    Nonreactive [Nonreactive]      Standard Hematology Screen   Hematocrit 2022, Feb-10    39.9 [37.0 - 48.5 %]      Hemoglobin 2022, Feb-10    12.8 [12.0 - 16.0 g/dL]      MCV 2022, Feb-10    89 [82 - 98 fL]      Platelets 2022, Feb-10    277 [150 - 450 K/uL]      Delirium Screening   Bacteria, UA 2021, May-14    Negative [Negative /hpf]           Neuroimaging:    MRI brain/head without contrast on 1/2/22   Formal interpretation by Radiology:   Normal parenchymal volume. Stable scattered T2 FLAIR hyperintense white matter foci are present, likely related to chronic microvascular ischemic change. No parenchymal restricted diffusion. No evidence of intracranial hemorrhage. No extra-axial fluid collection or mass. No intracranial mass effect. No hydrocephalus. Midline structures have a normal configuration. Visualized pituitary gland and infundibulum are normal. Visualized major intracranial vascular structures demonstrate normal flow voids and are normal in course and caliber.   Independently reviewed radiological imaging by Jt Jain MD. MPH. Behavioral Neurologist   T1: Largely normal brain slight widening of the marginal sulci in the right hemisphere. Cannot comment on hippocampi as no coronal views available. Slight thinning of the posterior cingulate. Otherwise largely normal brain.   T2/FLAIR: Subtle white matter hyperintensity left pontine region with wispy nature possible wallerian  degeneration. Scattered high dorsal punctuated subcortical white matter changes with both the large somewhat oval punctuated lesions in the left subcortical hemisphere and mild intermittent hyperintensities in the right Zeng in turn a watershed territories likely suggestive of uncontrolled atherosclerotic risk factors.   DWI/ADC: No Significant DWI hyperintensities/hypointensities. No ADC correlation.   SWI/GRE: No Significant hypointensities to suggest cortical/subcortical hemosiderin deposition.   Impression: : Largely normal brain with slight sulcal widening of the right marginal sulcus. Scattered subcortical white matter disease most well distributed in the internal watershed territories left greater than right consistent with atherosclerotic microvascular disease.    MRI brain/head with and without contrast on 2/10/2022   Formal interpretation by Radiology:   No acute intraparenchymal hemorrhage or infarction. No abnormal intracranial enhancement.   Independently reviewed radiological imaging by Jt Jain MD. MPH. Behavioral Neurologist   T1: No significant cortical atrophy with age-appropriate changes. Subcortical nuclei and hippocampi are without significant atrophy. Possible widening of the marginal sulci however within range of normal variant.   T2/FLAIR: Scattered subcortical white matter hyperintensities in the bilateral corona radiata left greater than right stable compared to early January scan. Atypical left pontine hyperintensity adjacent to facial nerve/trigeminal nuclei.   DWI/ADC: No Significant DWI hyperintensities/hypointensities. No ADC correlation.   SWI/GRE: No Significant hypointensities to suggest cortical/subcortical hemosiderin deposition.   Impression: : Scattered subcortical white matter hyperintensities left greater than right consistent with hypertensive microvascular ischemic changes. Atypical left pontine hyperintensity adjacent to facial / trigeminal nerve  nuclei stable compared to January 2022 scan.     Procedures:    Electrocardiogram on 1/2/22   Formal interpretation:   Vent. Rate : 085 BPM     Atrial Rate : 085 BPM    P-R Int : 170 ms          QRS Dur : 092 ms     QT Int : 386 ms       P-R-T Axes : 053 071 079 degrees    QTc Int : 459 ms Sinus rhythm with Premature supraventricular complexes Nonspecific ST abnormality Otherwise normal ECG   Independently reviewed Electrocardiogram by Jt Jian MD. MPH. Behavioral Neurologist   Impression: : Received ECG has no evidence of sinus node disease. HR (>=50-60). Prolonged TN interval (>0.22 s). Broad QRS complex (> 0.12 s).     Clinical Summary:     Ms. Espinoza is a 62-year-old right-handed female with a relevant past medical history of PAF, HTN, HLD, ADHD, CAD, MDD, who presents reporting a 9-month history of step-wise progressive neurocognitive impairment.       The clinical history is suggestive of:   Memory Impairment: STM encoding impairment, LTM encoding-retrieval impairment   Attention Impairment: Attention, Selective attention, Sustained attention, Shifting attention   Executive Impairment: Energization, Working Memory, Response Inhibition   Language Impairment: Language Dysfunction   Motor/Coordination Impairment: Sensory motor integration   Sensory Impairment: Limbic Dysfunction   Behavior Impairment: Emotional Regulation   Psychiatric Impairment: Neurovegetative, Social Coherence, Signal-Noise Dysregulation  The neurological examination is significant for:   Cerebellar Dysfunction: dysdiadochokinesia, rebound, truncal ataxia (walking), stance imbalance (Romberg sign)   Cortical Temporal-Parietal Dysfunction: left-right confusion, dysgraphia   Executive Impairment: thought disorder, judgment   Motor Coordination: gait imbalance (tiptoes)   Movement Disorder (Gait): strength (difficulty rising), abnormal features (difficulty turning), dorsiflexion weakness (heel walking)   Movement  Disorder (Hyperkinetic): tremor (postural)  Informal neuropsychology battery is positive (based on age and education) for:   Executive predominant multidomain MCI   BEHAV5+ 4/6: See ROS section for a full description  Neurological imaging   MRI brain/head without contrast (1/2/22): Largely normal brain with slight sulcal widening of the right marginal sulcus. Scattered subcortical white matter disease most well distributed in the internal watershed territories left greater than right consistent with atherosclerotic microvascular disease.   MRI brain/head with and without contrast (2/10/2022): Scattered subcortical white matter hyperintensities left greater than right consistent with hypertensive microvascular ischemic changes. Atypical left pontine hyperintensity adjacent to facial / trigeminal nerve nuclei stable compared to January 2022 scan.        Assessment:        Ms. Espinoza's clinical presentation is dysexecutive predominant mild cognitive impairment (CDR-SOB: 2 - Questionable cognitive impairment).     Ms. Espinoza's clinical syndrome is best described as Mild Cognitive Impairment (MCI-ADRC) (Nelson MS, et al. 2011 Alzheimer's & Dementia).   Concern regarding an intraindividual change in cognition   Impairment in one or more cognitive domains   Preservation of independence in functional abilities.   Not demented     The the patient reports mild brain fog and subtle memory deficits since exposure to COVID in July 2021. In addition to the respiratory and gastrointestinal symptoms that accompany COVID-19, many people who have been exposed to the virus experience short- and/or long-term neuropsychiatric symptoms, including but not limited to cognitive and attention deficits, often referred to as 'brain fog'. For some, these neurological symptoms persist, and researchers are working to understand the mechanisms by which this brain dysfunction occurs, and what that means for cognitive health long term. Since  the discontinuation of Adderall in December 2021 the patient reports an acute/abrupt worsening in attention and concentration. Adult ADHD shares many overlapping features with MCI, including cognitive deficits (particularly in memory, attention and executive functioning) and psychiatric comorbidities (such as anxiety, depression, and sleep disorders), which are often misattributed to new late life neurocognitive impairment. However the patient had a significant ground level fall in the 1st week of 2022  with subsequent worsening of all aforementioned symptoms and now with severe persistent dysgeusia, worsening left facial numbness, and executive dysfunction.      The observations made above were discussed with the patient and her family. 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 The patient is improving with management of cortical hyper excitability and insomnia. However the patient continues to have concerning left facial sensory and motor weakness. The patient has notable white matter hyperintensities in the left pontine region that associate well with clinical symptomology. It is uncertain whether the patient has had a prior stroke resulting in clinical semiology or if this is status migrainous related acephalgic headache. Will discuss clinical presentation with headache medicine refer for future management.        Care Management Plan:     #PCS/Insomnia Treatment:   Continue Ramelteon 8mg qHS   Increase gabapentin to 300 HS with PRN gabapentin if she awakes  #Atypical Acephalgic HA:   Referral to HA medicine   Continue gabapentin 300mg BID for headaches   We will discuss case with headache medicine for further management  #Behavioral/Environmental Treatment   We recommend engaging in activities that stimulate cognitively and socially while avoiding excessive stimulation and fatigue in overwhelmingly complex  "situations.   We recommend integrating routine and schedule into your daily life. https://www.alzheimersproject.org/news/the-importance-of-routine-and-familiarity-to-persons-with-dementia/  #Health Maintenance/Lifestyle Advice   We have discussed the value in aggressively controlling vascular risk factors like hypertension, hyperlipidemia, and Diabetes SBP<130, LDL<100, A1C<7.0.   We discussed the need to optimize lifestyle choices including a heart-healthy diet (e.g., Mediterranean or DASH), increased cardiovascular exercise (goal 150 minutes of moderate-intensity per week), and stay cognitively and socially active.  #Support   We all need support sometimes. Get easy access to local resources, community programs, and services. https://www.communityresourcefinder.org/   Learn more about Cognitive Impairment in [place]: https://www.alz.org/professionals/public-health/state-overview/[place]  #Safety   The Alzheimer's Association administers the nationwide "Safe Return" program with identification bracelets, necklaces, or clothing tags and 24-hour assistance. More information is available online at https://www.alz.org/help-support/caregiving/safety/medicalert-with-24-7-wandering-support  #Follow up:   Follow-up as needed.    Thank you for allowing us to participate in the care of your patient. Please do not hesitate to contact us with any questions or concerns.     It was a pleasure seeing Ms. Espinoza and we look forward to seeing them at their follow-up visit.     This note is dictated on M*Modal Fluency Direct word recognition program. There are word recognition mistakes that are occasionally missed on review.      Scheduled Follow-up :  Future Appointments   Date Time Provider Department Center   7/6/2022 10:10 AM LAB, TOM [facility] LAB [place]   7/11/2022  9:40 AM Wendy Rodriguez DO [facility] MED [place]   7/18/2022  9:45 AM Jesus Sol MD [facility] CARDIO [place]       After Visit Medication List :   "   Medication List          Accurate as of February 22, 2022  3:19 PM. If you have any questions, ask your nurse or doctor.            CONTINUE taking these medications    apixaban 5 mg Tab  Commonly known as: ELIQUIS     aspirin 81 MG EC tablet  Commonly known as: ECOTRIN  Take 1 tablet (81 mg total) by mouth once daily.     bisoprolol 5 MG tablet  Commonly known as: ZEBETA  Take 1 tablet (5 mg total) by mouth once daily.     flecainide 100 MG Tab  Commonly known as: TAMBOCOR     gabapentin 300 MG capsule  Commonly known as: NEURONTIN  Take 1 capsule (300 mg total) by mouth 3 (three) times daily.     multivitamin with minerals tablet     nitroGLYCERIN 0.4 MG SL tablet  Commonly known as: NITROSTAT  Place 1 tablet (0.4 mg total) under the tongue every 5 (five) minutes as needed for Chest pain.     pantoprazole 40 MG tablet  Commonly known as: PROTONIX  Take 1 tablet (40 mg total) by mouth once daily.     ramelteon 8 mg tablet  Commonly known as: ROZEREM  Take 1 tablet (8 mg total) by mouth every evening.     rosuvastatin 40 MG Tab  Commonly known as: CRESTOR  Take 1 tablet (40 mg total) by mouth every evening.            Signing Physician:  Jt Francisco MD    Billing:    -----------------------------------------------------------------------------    I performed this consultation using real-time Telehealth tools, including a live video connection between my location and the patient's location. Prior to initiating the consultation, I obtained informed verbal consent to perform this consultation using Telehealth tools and answered all the questions about the Telehealth interaction. The participants understand that only a limited neurological exam and limited neuropsychological testing can be performed using Telehealth tools.    I spent a total of 30 minutes (time-in: 11:00 AM; time-out: 11:30 AM) on 02/22/2022, in-person face-to-face with the patient and caregiver(s), >50% of that time was spent counseling regarding  the symptoms, treatment plan, risks, therapeutic options, lifestyle modifications, and/or safety issues for the diagnoses above.    10/14 Review of Systems completed and is negative except as stated above in HPI (Systems reviewed: Const, Eyes, ENT, Resp, CV, GI, , MSK, Skin, Neuro)    I reviewed previous labs for a total of 5 minutes on 02/22/2022. This is directly related to the face-to-face encounter. Review of previous labs was performed all negative except as stated above in HPI    I independently reviewed radiological imaging, specimen, and tracing for a total of 15 minutes on 02/22/2022. This is directly related to the face-to-face encounter. Independent review of radiological imaging, specimen, and tracing was noted to be within normal limits except as stated above in HPI    I performed a neurobehavioral status examination that included a clinical assessment of thinking, reasoning, and judgment. Please see above HPI and ROS for full details. This exam was performed on 02/22/2022 and included 12 minutes spent on direct face-to-face clinical observation and interview with the patient and 19  minutes spent interpreting test results and preparing the report. The total time of 31 minutes spent on the neurobehavioral status examination is not included in the time spent on evaluation and management coding.        Total Billing time spent on encounter/documentation for this patient's evaluation and management, not including the neurobehavioral status examination: 40 minutes.

## 2022-02-22 NOTE — TELEPHONE ENCOUNTER
Call placed to patient. Informed patient of Dr. Francisco's intent for infusion at outpatient facility. Discussed with patient that authorization may take some time and to please reach out to the office should the migraine become unmanageable for further advice.

## 2022-02-23 ENCOUNTER — TELEPHONE (OUTPATIENT)
Dept: NEUROLOGY | Facility: CLINIC | Age: 62
End: 2022-02-23
Payer: COMMERCIAL

## 2022-02-23 NOTE — TELEPHONE ENCOUNTER
Called patient and scheduled new patient appointment. Date/Time/Location confirmed. Verbalized understanding.

## 2022-03-02 ENCOUNTER — OFFICE VISIT (OUTPATIENT)
Dept: NEUROLOGY | Facility: CLINIC | Age: 62
End: 2022-03-02
Payer: COMMERCIAL

## 2022-03-02 VITALS
TEMPERATURE: 97 F | HEART RATE: 65 BPM | HEIGHT: 68 IN | RESPIRATION RATE: 17 BRPM | SYSTOLIC BLOOD PRESSURE: 127 MMHG | WEIGHT: 212.06 LBS | BODY MASS INDEX: 32.14 KG/M2 | DIASTOLIC BLOOD PRESSURE: 79 MMHG

## 2022-03-02 DIAGNOSIS — G43.719 INTRACTABLE CHRONIC MIGRAINE WITHOUT AURA AND WITHOUT STATUS MIGRAINOSUS: Primary | ICD-10-CM

## 2022-03-02 DIAGNOSIS — I67.1 NONRUPTURED CEREBRAL ANEURYSM: ICD-10-CM

## 2022-03-02 DIAGNOSIS — G44.321 INTRACTABLE CHRONIC POST-TRAUMATIC HEADACHE: ICD-10-CM

## 2022-03-02 DIAGNOSIS — R43.2 DYSGEUSIA: ICD-10-CM

## 2022-03-02 DIAGNOSIS — E66.9 OBESITY, CLASS I, BMI 30-34.9: ICD-10-CM

## 2022-03-02 DIAGNOSIS — F51.05 INSOMNIA SECONDARY TO ANXIETY: ICD-10-CM

## 2022-03-02 DIAGNOSIS — I25.10 MILD CAD: ICD-10-CM

## 2022-03-02 DIAGNOSIS — G43.709 CHRONIC MIGRAINE WITHOUT AURA WITHOUT STATUS MIGRAINOSUS, NOT INTRACTABLE: ICD-10-CM

## 2022-03-02 DIAGNOSIS — R43.9 SENSE OF SMELL ALTERED: ICD-10-CM

## 2022-03-02 DIAGNOSIS — G43.119 INTRACTABLE MIGRAINE WITH AURA WITHOUT STATUS MIGRAINOSUS: ICD-10-CM

## 2022-03-02 DIAGNOSIS — I10 ESSENTIAL HYPERTENSION: ICD-10-CM

## 2022-03-02 DIAGNOSIS — G47.00 INSOMNIA, UNSPECIFIED TYPE: ICD-10-CM

## 2022-03-02 DIAGNOSIS — I25.10 CORONARY ARTERY DISEASE, UNSPECIFIED VESSEL OR LESION TYPE, UNSPECIFIED WHETHER ANGINA PRESENT, UNSPECIFIED WHETHER NATIVE OR TRANSPLANTED HEART: ICD-10-CM

## 2022-03-02 DIAGNOSIS — G31.84 MCI (MILD COGNITIVE IMPAIRMENT): ICD-10-CM

## 2022-03-02 DIAGNOSIS — F41.9 INSOMNIA SECONDARY TO ANXIETY: ICD-10-CM

## 2022-03-02 DIAGNOSIS — I67.1 CEREBRAL ANEURYSM: ICD-10-CM

## 2022-03-02 PROCEDURE — 3044F HG A1C LEVEL LT 7.0%: CPT | Mod: CPTII,S$GLB,, | Performed by: PSYCHIATRY & NEUROLOGY

## 2022-03-02 PROCEDURE — 1160F RVW MEDS BY RX/DR IN RCRD: CPT | Mod: CPTII,S$GLB,, | Performed by: PSYCHIATRY & NEUROLOGY

## 2022-03-02 PROCEDURE — 3008F BODY MASS INDEX DOCD: CPT | Mod: CPTII,S$GLB,, | Performed by: PSYCHIATRY & NEUROLOGY

## 2022-03-02 PROCEDURE — 3008F PR BODY MASS INDEX (BMI) DOCUMENTED: ICD-10-PCS | Mod: CPTII,S$GLB,, | Performed by: PSYCHIATRY & NEUROLOGY

## 2022-03-02 PROCEDURE — 99417 PROLNG OP E/M EACH 15 MIN: CPT | Mod: S$GLB,,, | Performed by: PSYCHIATRY & NEUROLOGY

## 2022-03-02 PROCEDURE — 1159F MED LIST DOCD IN RCRD: CPT | Mod: CPTII,S$GLB,, | Performed by: PSYCHIATRY & NEUROLOGY

## 2022-03-02 PROCEDURE — 1159F PR MEDICATION LIST DOCUMENTED IN MEDICAL RECORD: ICD-10-PCS | Mod: CPTII,S$GLB,, | Performed by: PSYCHIATRY & NEUROLOGY

## 2022-03-02 PROCEDURE — 99215 OFFICE O/P EST HI 40 MIN: CPT | Mod: S$GLB,,, | Performed by: PSYCHIATRY & NEUROLOGY

## 2022-03-02 PROCEDURE — 3078F DIAST BP <80 MM HG: CPT | Mod: CPTII,S$GLB,, | Performed by: PSYCHIATRY & NEUROLOGY

## 2022-03-02 PROCEDURE — 3044F PR MOST RECENT HEMOGLOBIN A1C LEVEL <7.0%: ICD-10-PCS | Mod: CPTII,S$GLB,, | Performed by: PSYCHIATRY & NEUROLOGY

## 2022-03-02 PROCEDURE — 99215 PR OFFICE/OUTPT VISIT, EST, LEVL V, 40-54 MIN: ICD-10-PCS | Mod: S$GLB,,, | Performed by: PSYCHIATRY & NEUROLOGY

## 2022-03-02 PROCEDURE — 3074F SYST BP LT 130 MM HG: CPT | Mod: CPTII,S$GLB,, | Performed by: PSYCHIATRY & NEUROLOGY

## 2022-03-02 PROCEDURE — 99999 PR PBB SHADOW E&M-EST. PATIENT-LVL V: CPT | Mod: PBBFAC,,, | Performed by: PSYCHIATRY & NEUROLOGY

## 2022-03-02 PROCEDURE — 3074F PR MOST RECENT SYSTOLIC BLOOD PRESSURE < 130 MM HG: ICD-10-PCS | Mod: CPTII,S$GLB,, | Performed by: PSYCHIATRY & NEUROLOGY

## 2022-03-02 PROCEDURE — 1160F PR REVIEW ALL MEDS BY PRESCRIBER/CLIN PHARMACIST DOCUMENTED: ICD-10-PCS | Mod: CPTII,S$GLB,, | Performed by: PSYCHIATRY & NEUROLOGY

## 2022-03-02 PROCEDURE — 99999 PR PBB SHADOW E&M-EST. PATIENT-LVL V: ICD-10-PCS | Mod: PBBFAC,,, | Performed by: PSYCHIATRY & NEUROLOGY

## 2022-03-02 PROCEDURE — 99417 PR PROLONGED SVC, OUTPT, W/WO DIRECT PT CONTACT,  EA ADDTL 15 MIN: ICD-10-PCS | Mod: S$GLB,,, | Performed by: PSYCHIATRY & NEUROLOGY

## 2022-03-02 PROCEDURE — 3078F PR MOST RECENT DIASTOLIC BLOOD PRESSURE < 80 MM HG: ICD-10-PCS | Mod: CPTII,S$GLB,, | Performed by: PSYCHIATRY & NEUROLOGY

## 2022-03-02 NOTE — PROGRESS NOTES
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 Of note she has been recently evaluated by my colleague Dr. Clay (neurology in [facility]) who diagnosed post traumatic headache with cervical myofascial pain. She had been on TPX and was reporting taste changes thus it was felt to be TPX related. She was discontinued off tpx and started on gabapentin 300mg bid. She was recommended to d/c smoking. She was referred to cognitive neurology Dr. Francisco for memory evaluation. She was diagnosed with MCI dysexecutive predominant. She was referred from cognitive neurology to headache medicine for further management.      Headache history  Age at onset and course over time: 3 months ago she fell "flat on my face", unclear how it happened, she mentions that she just fell, does not recall the symptoms, no presyncopal symptoms, fell at home at side walk walking to the car to leave for car. She had LOC for seconds, she had injury to nose , nose scrapes, she thought initially broke her nose, "there was blood everywhere", had headache that day for a few days and stayed home from work the rest of the weeks. After this she started noticing more headaches. She is currently having 4-5 days a week of headache.  She mentions that prior to fall she had migraine 2 days a week lasting the rest of the day as they typically starting int he afternoon and until she would go to sleep that evening. She would treat with aleve, heat/ice pack which would bring down the pain relief.   Can wake up with dull headache, she will have left facial numbness " "  Location: left frontal area to the right side. Mainly left hemicranial area.    Character: pressure   Intensity: current 4/10, at best 2/10 up to 8/10 , 2-3 days a week severe which are lasting from 5 pm to longer has to lay down and go to sleep  Frequency: 4-5 days a week   HA free 7-8 days a month.    Typically happening mid day to evening   Duration:  >4 hours   Aura: initially left facial numbness lasted 10 minutes and then up to 60 minutes. First episode on 5/21, felt that this numbness in the left side of face, slowly worsening numbness, her  said left eye drooping, she went to hospital and diagnosed "mini stroke" she mentions that she gets a headache everytime this happens. She had already been having longer episodes of numbness. She mentions that any stress will trigger. At times the left tongue feels numb as well. She denies any other body numbness or any other neurologica symptoms.   Associated symptoms: photophobia, phonophobia, osmophobia, kinesiophobia, neck tightness/pain, nausea  Other neurologic symptoms: blurred vision, problems concentrating, memory, task completion, problems with relaxation  Precipitating factors: sleep deprivation,  missed meal, exercise, foods, alcohol, weather changes, stress  Alleviating factors:darkness, heat, ice  Aggravating factors: fatigue, exposure to noise, stress  Steady high pitched tinnitus - constant, from prior to the fall and into the evening it's much worse.   No TVOs  No autonomic features   Family history of headache: mother has migraine   ER/UC visits: 1   Caffeine: none  Sleep: trouble staying asleep, falling asleep, known insomnia on ramelteon 8mg     Medication history:  Acute:  Nurtec ODT 75mg - took a few hours after headache started, no help     Preventive:  Topiramate - taste changes, concern for worsening cognition  Gabapentin 300mg bid -> no help headache, helps sleep   Bisoprolol 5mg   Ramelteon 8mg qhs - for sleep, no benefit headache "   Adderall - for ADHD , stopped in the past.     MIDAS: 148      Neuroimaging and other studies:   Results for orders placed or performed during the hospital encounter of 02/10/22   MRI Brain W WO Contrast    Narrative    EXAMINATION:  MRI BRAIN W WO CONTRAST    CLINICAL HISTORY:  Transient ischemic attack (TIA);Dizziness, non-specific;Dizziness and giddiness    TECHNIQUE:  Multiplanar multisequence MR imaging of the brain was performed before and after the administration of 10 mL Gadavist intravenous contrast.    COMPARISON:  MRI brain 01/02/2022.  CTA head 01/02/2022.    FINDINGS:  The ventricles are stable in size without evidence of hydrocephalus.  No extra-axial blood or fluid collections.    Multiple scattered foci of T2/FLAIR signal hyperintensity in the supratentorial white matter which are nonspecific, however likely related to chronic ischemic microvascular changes, overall mild.  No mass lesion, acute hemorrhage, edema, or acute infarct. No abnormal enhancement.    Normal vascular flow voids are preserved.  The sella and parasellar regions are within normal limits.    Skull/Extracranial Contents (limited evaluation): Bone marrow signal intensity is normal.  Patchy mucosal thickening of the left greater than right ethmoidal air cells.  Mastoid air cells appear clear.      Impression    No acute intraparenchymal hemorrhage or infarction.  No abnormal intracranial enhancement.    Mild chronic ischemic microvascular changes.    Electronically signed by resident: Magda Guthrie MD  Date:    02/10/2022  Time:    18:42    Electronically signed by: Todd Colmenares MD  Date:    02/10/2022  Time:    18:56   Results for orders placed or performed during the hospital encounter of 01/02/22   MRI Brain Without Contrast    Narrative    EXAMINATION:  MRI BRAIN WITHOUT CONTRAST    CLINICAL HISTORY:  Stroke, follow up;Headache, new or worsening (Age >= 50y).    TECHNIQUE:  Multiplanar multisequence MR imaging of the brain was  performed without contrast.    COMPARISON:  CT head without contrast, 01/02/2022.  MRI brain without contrast, 05/14/2021.    FINDINGS:  INTRACRANIAL: Normal parenchymal volume.  Stable scattered T2 FLAIR hyperintense white matter foci are present, likely related to chronic microvascular ischemic change.  No parenchymal restricted diffusion.  No evidence of intracranial hemorrhage.  No extra-axial fluid collection or mass.  No intracranial mass effect.  No hydrocephalus.  Midline structures have a normal configuration.  Visualized pituitary gland and infundibulum are normal.  Visualized major intracranial vascular structures demonstrate normal flow voids and are normal in course and caliber.    SINUSES: Patchy bilateral ethmoid sinus mucosal thickening opacities.  Mild left frontal and right sphenoid sinus mucosal thickening.  Trace left maxillary sinus mucosal thickening.  Mastoid air cells are clear.    ORBITS: Visualized orbits are normal.      Impression    1. No acute intracranial abnormality.  2. Stable mild chronic microvascular ischemic change.      Electronically signed by: Raffi Ceron MD  Date:    01/03/2022  Time:    08:12   CT Head Without Contrast    Narrative    EXAMINATION:  CT HEAD WITHOUT CONTRAST    CLINICAL HISTORY:  Neuro deficit, acute, stroke suspected;    TECHNIQUE:  Low dose axial CT images obtained throughout the head without intravenous contrast. Sagittal and coronal reconstructions were performed.  .  Automated exposure control used.    COMPARISON:  None.    FINDINGS:  Intracranial compartment:    Ventricles and sulci are normal in size for age without evidence of hydrocephalus. No extra-axial blood or fluid collections.    The brain parenchyma appears normal. No parenchymal mass, hemorrhage, edema or major vascular distribution infarct.    Cranium intact.  Partial filling of the ethmoid sinuses.      Impression    No intracranial abnormality.      Electronically signed  by: Jana Petersen MD  Date:    01/02/2022  Time:    16:29   Results for orders placed or performed during the hospital encounter of 05/14/21   MRA Brain    Narrative    EXAMINATION:  MRA BRAIN WITHOUT CONTRAST    CLINICAL HISTORY:  Pt c/o left side of face/head numbness started yesterday around 1330. +HA Pt denies N/V, vision changes, unilateral weakness, slurred speech, facial droop. Pt AAOx4. +blood thinners    TECHNIQUE:  3D Time-of-flight Images were performed over the brain.  Maximum intensity projection and 3D reconstructions were performed.  Contrast was not administered.    COMPARISON:  MRI brain without contrast, 05/14/2021.  CT head without contrast, 05/14/2021, 02/18/2014.    FINDINGS:  INTERNAL CAROTIDS: No occlusion, hemodynamically significant stenosis or aneurysm.    ANTERIOR CEREBRALS: No proximal branch occlusion, hemodynamically significant stenosis or aneurysm.    MIDDLE CEREBRALS: No proximal branch occlusion, hemodynamically significant stenosis or aneurysm.    POSTERIOR CEREBRALS: No proximal branch occlusion, hemodynamically significant stenosis or aneurysm.    BASILAR: 3 mm saccular basilar tip aneurysm (series 21, image 6).  No occlusion or hemodynamically significant stenosis.    VERTEBRALS: No occlusion, hemodynamically significant stenosis or aneurysm.    OTHER: No vascular malformation.      Impression    1. 3 mm basilar tip aneurysm.  2. No occlusion or hemodynamically significant stenosis      Electronically signed by: Raffi Ceron MD  Date:    05/14/2021  Time:    23:03       ROS: The fourteen point review of system was performed.     Constitutional:  Denies fevers/cold or heat intolerance, weight loss/gain or fatigue.  Eyes:                         Denies diplopia, ptosis, visual field defects or ocular disease   excepting any listed above.  ENT:   +ringing in the ears b/l, decreased taste/altered smell started 6 months after COVID infection  Cardiovascular:  As per HPI    Pulmonary:  Denies dyspnea, COPD, RAD or infection or neoplasm excepting any listed above.  Gastrointestinal: Denies ulcer disease, liver or other disease excepting any listed above.  Skin:   Denies rash, skin cancer, or other cutaneous disorder excepting any listed above.  Neurological:  See HPI  Musculoskeletal: Denies RA, fractures or other joint or skeletal problems excepting any listed above.  Heme/Lymphatic: Denies any blood or lymph system neoplasm or disorder excepting any listed above.  Endocrine:  Denies any thyroid or other disorders, excepting any listed above.  Allergic/Immuno: Denies any autoimmune disease or allergy excepting any listed above.  Psychiatric:  +difficulty sleeping, anxiety, depression   Urologic:  Denies any difficulties with the urinary system or sexual function except noted above.    Past Medical History:   Diagnosis Date    ADHD (attention deficit hyperactivity disorder)     Anemia     Anticoagulant long-term use     Anxiety     Coronary artery disease     CAD    Elevated LFTs     elevated enzymes    GERD (gastroesophageal reflux disease) 4/24/2021    Headache     Hyperlipemia 4/24/2021    Hypertension     LBBB (left bundle branch block)     Lower extremity edema     Major depressive disorder, recurrent episode, mild 1/3/2022    Obesity     Paroxysmal A-fib     Skin cancer     skin ca       Past Surgical History:   Procedure Laterality Date    ABLATION  04/25/2021    to heart for a-fib x 2    CORONARY ANGIOGRAPHY N/A 5/6/2021    Procedure: ANGIOGRAM, CORONARY ARTERY;  Surgeon: Jesus Sol MD;  Location: [facility] CATH;  Service: Cardiology;  Laterality: N/A;    LEFT HEART CATHETERIZATION N/A 5/6/2021    Procedure: Left heart cath;  Surgeon: Jesus Sol MD;  Location: [facility] CATH;  Service: Cardiology;  Laterality: N/A;    TONSILLECTOMY      TUBAL LIGATION         Family History   Problem Relation Age of Onset    Diabetes Mother     Osteoporosis Maternal  Grandmother     Pancreatic cancer Brother      Social history:  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     Social History     Tobacco Use    Smoking status: Current Every Day Smoker     Packs/day: 1.00     Years: 40.00     Pack years: 40.00     Types: Cigarettes    Smokeless tobacco: Never Used    Tobacco comment: down to 0.25 pack of  cigarettes per day   Substance Use Topics    Alcohol use: Not Currently    Drug use: No         Review of patient's allergies indicates:   Allergen Reactions    Effexor [venlafaxine] Other (See Comments)     Bruising all over body and elevated blood pressure       Current Outpatient Medications:     aspirin (ECOTRIN) 81 MG EC tablet, Take 1 tablet (81 mg total) by mouth once daily. (Patient not taking: Reported on 2/16/2022), Disp: 90 tablet, Rfl: 1    bisoprolol (ZEBETA) 5 MG tablet, Take 1 tablet (5 mg total) by mouth once daily., Disp: 30 tablet, Rfl: 1    gabapentin (NEURONTIN) 300 MG capsule, Take 1 capsule (300 mg total) by mouth 3 (three) times daily., Disp: 90 capsule, Rfl: 11    multivitamin with minerals tablet, Take 1 tablet by mouth once daily., Disp: , Rfl:     nitroGLYCERIN (NITROSTAT) 0.4 MG SL tablet, Place 1 tablet (0.4 mg total) under the tongue every 5 (five) minutes as needed for Chest pain., Disp: 30 tablet, Rfl: 1    pantoprazole (PROTONIX) 40 MG tablet, Take 1 tablet (40 mg total) by mouth once daily., Disp: 90 tablet, Rfl: 2    ramelteon (ROZEREM) 8 mg tablet, Take 1 tablet (8 mg total) by mouth every evening., Disp: 30 tablet, Rfl: 3    rosuvastatin (CRESTOR) 40 MG Tab, Take 1 tablet (40 mg total) by mouth every evening., Disp: 90 tablet, Rfl: 3      PHYSICAL EXAMINATION  Vitals:    03/02/22 0805   BP: 127/79   Pulse: 65   Resp: 17   Temp: 96.9 °F (36.1 °C)   General: The patient is a well-developed, well-nourished looking of stated age in no acute distress.  Head: Normocephalic, atraumatic  Eyes, ears, nose and throat: normal.  Neck:  Supple  Cardiovascular: No carotid bruits.  Regular rate and rhythm.   Skin: no rashes  No pericranial tenderness, no cervical musculature tenderness   NEUROLOGIC EXAM:  MENTAL: The patient is awake, alert and oriented times to time, place, location and situation. Intact recent memory, attention, concentration. Language and speech are normal. No aphasia, no dysarthria  CRANIAL NERVES:   CN II: visual fields are intact to confrontation with no defects;  funduscopic examination is within normal limits without evidence of papilledema and signs of venous pulsations.  Pupils are equal, round and reactive to light and accommodation.    III, IV and VI: extraocular movements are intact to all directions of gaze with normal convergence.  V: facial sensation is intact to light touch in divisions V1 through V3.    VII: Facial muscle strength is intact to eyebrow raising, forceful eyelid closure, and cheek puffing.    VIII: Hearing acuity is intact to finger rub.  IX, X: palate rises symmetrically and uvula midline.    XI: Sternocleidomastoid and trapezius strength are 5/5 bilaterally.    XII: Tongue protrudes midline without atrophy or fasciculations.   MOTOR EXAM: No atrophy or fasciculations are present. No pronator drift,  shows normal strength ( 5/5 strength )in all 4 extremities. Tone is normal.   SENSORY EXAM: intact  light touch,bilaterally.  CEREBELLAR EXAM: shows normal finger-to-nose and heel-to-shin.   DEEP TENDON REFLEXES:  +2 in brachioradialis, biceps, triceps, knee jerks, ankle jerks, downgoing toes b/l. No abnormal reflexes are present.  GAIT/STANCE: Romberg positive.  Standard gait was normal with normal stride, arm swing and turning.  Difficulty with tandem gait    Impression:  Chronic migraine withotu aura - family history of migraine, headache since early years, had baseline of 2 migraine attacks per week, then after a fall where she fell onto face she had quickly worsening of headache to now 22-23 days a  month at least of migraine, all of them meet migraine criteria, severe exacerbations 2-3 days a week lasting over 4 hours, sleep typically improves it. She has had neuroimaging that did not show any structural/intracranial cause to worsening. Neurological examination did not reveal any cranial nerve, motor deficits, she had positive Romberg and difficulty with tandem gait which she mentions has had for logn time now. There is some peripheral neuropathy likely contributing factor. At this moment we will treat chronic migraine, has been on beta blocker, 2 AEDs, is on sleeping medication and is allergic to venlafaxine. Will apply for Botox for chronic migraine and have her try Nurtec at onset of attakc. It has not helped her in the past, but she took it a few times and took it hours after headache began, discussed she needs to take it at onset of attack.     Migraine with aura - initially started lasting about 10 minuets, but now prolonged, will start mag/b2, if despite botox and nutraceuticals continues, consider lamotrigine.     Persistent post traumatic headache- significant worsening of baseline migraine disease after head injury.     Comorbidities:  MCI  - followed by Dr. Francisco   Insomnia- on ramelteon 8mg nightly by Dr. Francisco   CAD/LBBB/Paroxysmal Afib s/p ablation- followed by cardiology   HTN- well controlled on bisoprolol   HL- on rosuvastatin   adult ADHD- no longer on ADHD medication   Anxiety/depression/insomnia- allergic to effexor, currently on ramelteon   GERD    obesity - she has altered smell, taste difficulty smelling eating proteins, can only eat things that are seasoned with cinammon and sweets are better tolerated  Altered smell/taste- reports began 6 months after COVID, will have her see ENT.    aneurysm of basilar artery tip (3.5 x 3x 2.5mm) - followed and monitored by neurosurgery    smoker- smoking cessation recommended       Plan:   1- For preventive management: The patient has chronic migraines  (G43.754). Patient suffers from headaches more than 15  days a month lasting more than 4 hours a day with no relief of symptoms despite trying multiple  medications including but not limited to anti- epileptics,  antihypertensives,  and antidepressants. Botox treatment was approved for chronic migraines in October 2010. The patient will be an ideal candidate for Botox. We are planning for 3 treatments 3 months apart and aiming for atleast 50% improvement in the symptoms. If we see no improvement after 3 treatments, we will discontinue the injections.  Muscles to be injected:  total of 155 units of botulinum toxin type A were  injected in the following muscles: Procerus 5 units,  5 units bilaterally, frontalis 20 units, temporalis 20 units bilaterally, occipitalis 15 units, upper cervical paraspinals 10 units bilaterally and trapezius 15 units bilaterally. Total of 155 units in 31 sites.      B. Continue gabapentin 300mg twice a day     C. -- start magnesium in ONE of the following preparations -      1. Magnesium oxide 800mg daily (the most common over the counter kind, may causes loose stools)     2. Magnesium citrate 400-500mg daily (harder to find, but more neutral on the bowels)     3. Magnesium glycinate 400mg daily (hardest to find, look online, but most bowel-neutral, best absorbed)     AND     Vitamin b2 (riboflavin) , 200mg twice a day (can discolor urine)    Muscles to be injected:  total of 155 units of botulinum toxin type A were  injected in the following muscles: Procerus 5 units,  5 units bilaterally, frontalis 20 units, temporalis 20 units bilaterally, occipitalis 15 units, upper cervical paraspinals 10 units bilaterally and trapezius 15 units bilaterally. Total of 155 units in 31 sites.      2- Acute management: Nurtec ODT 75mg at ONSET of headache, max 1/day. Can be done every other day as needed.       If you try nurtec at onset of headache, no improvement, then we will switch  over to Ubrelvy     No triptans/ergotamines given CAD     3- ENT referral for altered taste smell that started 6 months after COVID     4- Keep track of headache.     RTC in 3 weeks for Botox.         Abbey Miguel MD   Board Certified Neurologist  [facility] Certified Headache Medicine     I spent  62  minutes on day of this encounter preparing, treating, and managing this patient with CHRONIC MIGRAINE, migraine with aura, persistent post traumatic headache, MCi , cad, htn, hl, insomnia, anxiety, depression, cerebral aneurysm, obesity, smoker, altered sense smell and taste.

## 2022-03-02 NOTE — PATIENT INSTRUCTIONS
1- For preventive management: The patient has chronic migraines (G43.719). Patient suffers from headaches more than 15  days a month lasting more than 4 hours a day with no relief of symptoms despite trying multiple  medications including but not limited to anti- epileptics,  antihypertensives,  and antidepressants. Botox treatment was approved for chronic migraines in October 2010. The patient will be an ideal candidate for Botox. We are planning for 3 treatments 3 months apart and aiming for atleast 50% improvement in the symptoms. If we see no improvement after 3 treatments, we will discontinue the injections.  Muscles to be injected:  total of 155 units of botulinum toxin type A were  injected in the following muscles: Procerus 5 units,  5 units bilaterally, frontalis 20 units, temporalis 20 units bilaterally, occipitalis 15 units, upper cervical paraspinals 10 units bilaterally and trapezius 15 units bilaterally. Total of 155 units in 31 sites.      B. Continue gabapentin 300mg twice a day     C. -- start magnesium in ONE of the following preparations -      1. Magnesium oxide 800mg daily (the most common over the counter kind, may causes loose stools)     2. Magnesium citrate 400-500mg daily (harder to find, but more neutral on the bowels)     3. Magnesium glycinate 400mg daily (hardest to find, look online, but most bowel-neutral, best absorbed)     AND     Vitamin b2 (riboflavin) , 200mg twice a day (can discolor urine)    Muscles to be injected:  total of 155 units of botulinum toxin type A were  injected in the following muscles: Procerus 5 units,  5 units bilaterally, frontalis 20 units, temporalis 20 units bilaterally, occipitalis 15 units, upper cervical paraspinals 10 units bilaterally and trapezius 15 units bilaterally. Total of 155 units in 31 sites.      2- Acute management: Nurtec ODT 75mg at ONSET of headache, max 1/day. Can be done every other day as needed.       If you try  nurtec at onset of headache, no improvement, then we will switch over to Ubrelvy     No triptans/ergotamines given CAD     3- ENT referral for altered taste smell that started 6 months after COVID     4- Keep track of headache.     5- Start vitamin B12 supplement SL 1,000mcg daily

## 2022-03-09 ENCOUNTER — TELEPHONE (OUTPATIENT)
Dept: NEUROLOGY | Facility: CLINIC | Age: 62
End: 2022-03-09
Payer: COMMERCIAL

## 2022-03-09 NOTE — TELEPHONE ENCOUNTER
PA for Ramelteon 8mg tablets denied.  I [facility], as per note from last office visit with Dr. Francisco she has been taking this medication as prescribed.

## 2022-03-15 ENCOUNTER — PATIENT MESSAGE (OUTPATIENT)
Dept: NEUROLOGY | Facility: CLINIC | Age: 62
End: 2022-03-15
Payer: COMMERCIAL

## 2022-03-21 NOTE — TELEPHONE ENCOUNTER
Per Makenna with [facility], patient cancelled appts scheduled this week for migraine d/t having botox scheduled for Wednesday with headache provider and wanted to let staff know the care was no longer needed.

## 2022-03-23 ENCOUNTER — PATIENT OUTREACH (OUTPATIENT)
Dept: ADMINISTRATIVE | Facility: OTHER | Age: 62
End: 2022-03-23
Payer: COMMERCIAL

## 2022-03-23 ENCOUNTER — PROCEDURE VISIT (OUTPATIENT)
Dept: NEUROLOGY | Facility: CLINIC | Age: 62
End: 2022-03-23
Payer: COMMERCIAL

## 2022-03-23 VITALS
BODY MASS INDEX: 31.78 KG/M2 | SYSTOLIC BLOOD PRESSURE: 110 MMHG | TEMPERATURE: 99 F | WEIGHT: 209.69 LBS | DIASTOLIC BLOOD PRESSURE: 72 MMHG | HEART RATE: 62 BPM | RESPIRATION RATE: 17 BRPM | HEIGHT: 68 IN

## 2022-03-23 DIAGNOSIS — G43.719 INTRACTABLE CHRONIC MIGRAINE WITHOUT AURA AND WITHOUT STATUS MIGRAINOSUS: Primary | ICD-10-CM

## 2022-03-23 PROCEDURE — 64615 CHEMODENERV MUSC MIGRAINE: CPT | Mod: S$GLB,,, | Performed by: PSYCHIATRY & NEUROLOGY

## 2022-03-23 PROCEDURE — 64615 PR CHEMODENERVATION OF MUSCLE FOR CHRONIC MIGRAINE: ICD-10-PCS | Mod: S$GLB,,, | Performed by: PSYCHIATRY & NEUROLOGY

## 2022-03-23 NOTE — PROCEDURES
Procedures     BOTOX PROCEDURE    PROCEDURE PERFORMED: Botulinum toxin injection (83827)    CLINICAL INDICATION: G43.719    Cycle #1    A time out was conducted just before the start of the procedure to verify the correct patient and procedure, procedure location, and all relevant critical information.   Signed consent obtained prior to procedure     Conventional methods of treatment but the patient has been unresponsive and refractory.The patient meets criteria for chronic headaches according to the ICHD-III, the patient has more than 15 headaches a month at least 8 of them meet migraine criteria, which last for more than 4 hours a day.     This is the first Botox injections and I am aiming for at least 50%  improvement in the patient's symptoms. Frequency of treatment is every 3 months unless no response to the treatments, at which time we will discontinue the injections.     DESCRIPTION OF PROCEDURE: After obtaining informed consent and under aseptic technique, a total of 155 units of botulinum toxin type A were   injected in the following muscles: Procerus 5 units,  5 units   bilaterally, frontalis 20 units, temporalis 20 units bilaterally,   occipitalis 15 units, upper cervical paraspinals 10 units bilaterally and trapezius 15 units bilaterally. The patient was given a total of 155 units in 31 sites.      The patient tolerated the procedure well. There were no complications. The patient was given a prescription for repeat treatment in 3 months.     Unavoidable waste 45 units    Abbey Miguel MD   Board Certified Neurologist   [facility] Certified Headache Medicine

## 2022-04-08 ENCOUNTER — PATIENT MESSAGE (OUTPATIENT)
Dept: NEUROLOGY | Facility: CLINIC | Age: 62
End: 2022-04-08
Payer: COMMERCIAL

## 2022-04-08 DIAGNOSIS — H93.13 TINNITUS AURIUM, BILATERAL: Primary | ICD-10-CM

## 2022-04-08 NOTE — TELEPHONE ENCOUNTER
Prior Authorization Request for Ramelteon 8mg tablets PO at bedtime sent via [employer] KEY: [id number].  Message routed to Dr. Francisco to inform him of this, as well as patient reporting ringing in her ears.

## 2022-04-20 ENCOUNTER — PATIENT MESSAGE (OUTPATIENT)
Dept: FAMILY MEDICINE | Facility: CLINIC | Age: 62
End: 2022-04-20
Payer: COMMERCIAL

## 2022-04-26 NOTE — TELEPHONE ENCOUNTER
PA denied.  Phone call placed to [facility], next option is appeal and fax letter to [phone].  Reference number for call: [id number]

## 2022-04-27 NOTE — TELEPHONE ENCOUNTER
Appeal consent form sent to patient via [facility] for signature and explanation that appeal cannot be submitted without her signing this form.

## 2022-05-16 ENCOUNTER — TELEPHONE (OUTPATIENT)
Dept: NEUROLOGY | Facility: CLINIC | Age: 62
End: 2022-05-16
Payer: COMMERCIAL

## 2022-05-16 NOTE — TELEPHONE ENCOUNTER
Called patient and she had issue signing on. It would not connect. Rescheduled virtual appointment. Date/Time confirmed. Verbalized understanding.

## 2022-05-17 ENCOUNTER — PATIENT OUTREACH (OUTPATIENT)
Dept: ADMINISTRATIVE | Facility: OTHER | Age: 62
End: 2022-05-17
Payer: COMMERCIAL

## 2022-05-18 ENCOUNTER — PATIENT MESSAGE (OUTPATIENT)
Dept: NEUROLOGY | Facility: CLINIC | Age: 62
End: 2022-05-18

## 2022-05-18 ENCOUNTER — OFFICE VISIT (OUTPATIENT)
Dept: OTOLARYNGOLOGY | Facility: CLINIC | Age: 62
End: 2022-05-18
Payer: COMMERCIAL

## 2022-05-18 ENCOUNTER — CLINICAL SUPPORT (OUTPATIENT)
Dept: AUDIOLOGY | Facility: CLINIC | Age: 62
End: 2022-05-18
Payer: COMMERCIAL

## 2022-05-18 VITALS — BODY MASS INDEX: 31.74 KG/M2 | HEIGHT: 68 IN | TEMPERATURE: 99 F | WEIGHT: 209.44 LBS

## 2022-05-18 DIAGNOSIS — R43.9 SENSE OF SMELL ALTERED: ICD-10-CM

## 2022-05-18 DIAGNOSIS — H93.13 TINNITUS AURIUM, BILATERAL: ICD-10-CM

## 2022-05-18 DIAGNOSIS — R43.2 DYSGEUSIA: ICD-10-CM

## 2022-05-18 DIAGNOSIS — H93.13 BILATERAL TINNITUS: Primary | ICD-10-CM

## 2022-05-18 PROCEDURE — 99203 PR OFFICE/OUTPT VISIT, NEW, LEVL III, 30-44 MIN: ICD-10-PCS | Mod: S$GLB,,, | Performed by: NURSE PRACTITIONER

## 2022-05-18 PROCEDURE — 1160F RVW MEDS BY RX/DR IN RCRD: CPT | Mod: CPTII,S$GLB,, | Performed by: NURSE PRACTITIONER

## 2022-05-18 PROCEDURE — 1159F MED LIST DOCD IN RCRD: CPT | Mod: CPTII,S$GLB,, | Performed by: NURSE PRACTITIONER

## 2022-05-18 PROCEDURE — 1159F PR MEDICATION LIST DOCUMENTED IN MEDICAL RECORD: ICD-10-PCS | Mod: CPTII,S$GLB,, | Performed by: NURSE PRACTITIONER

## 2022-05-18 PROCEDURE — 3044F PR MOST RECENT HEMOGLOBIN A1C LEVEL <7.0%: ICD-10-PCS | Mod: CPTII,S$GLB,, | Performed by: NURSE PRACTITIONER

## 2022-05-18 PROCEDURE — 3008F PR BODY MASS INDEX (BMI) DOCUMENTED: ICD-10-PCS | Mod: CPTII,S$GLB,, | Performed by: NURSE PRACTITIONER

## 2022-05-18 PROCEDURE — 1160F PR REVIEW ALL MEDS BY PRESCRIBER/CLIN PHARMACIST DOCUMENTED: ICD-10-PCS | Mod: CPTII,S$GLB,, | Performed by: NURSE PRACTITIONER

## 2022-05-18 PROCEDURE — 99999 PR PBB SHADOW E&M-EST. PATIENT-LVL II: ICD-10-PCS | Mod: PBBFAC,,,

## 2022-05-18 PROCEDURE — 99999 PR PBB SHADOW E&M-EST. PATIENT-LVL IV: CPT | Mod: PBBFAC,,, | Performed by: NURSE PRACTITIONER

## 2022-05-18 PROCEDURE — 99999 PR PBB SHADOW E&M-EST. PATIENT-LVL IV: ICD-10-PCS | Mod: PBBFAC,,, | Performed by: NURSE PRACTITIONER

## 2022-05-18 PROCEDURE — 92557 PR COMPREHENSIVE HEARING TEST: ICD-10-PCS | Mod: S$GLB,,, | Performed by: AUDIOLOGIST

## 2022-05-18 PROCEDURE — 3008F BODY MASS INDEX DOCD: CPT | Mod: CPTII,S$GLB,, | Performed by: NURSE PRACTITIONER

## 2022-05-18 PROCEDURE — 92567 TYMPANOMETRY: CPT | Mod: S$GLB,,, | Performed by: AUDIOLOGIST

## 2022-05-18 PROCEDURE — 3044F HG A1C LEVEL LT 7.0%: CPT | Mod: CPTII,S$GLB,, | Performed by: NURSE PRACTITIONER

## 2022-05-18 PROCEDURE — 99999 PR PBB SHADOW E&M-EST. PATIENT-LVL II: CPT | Mod: PBBFAC,,,

## 2022-05-18 PROCEDURE — 92557 COMPREHENSIVE HEARING TEST: CPT | Mod: S$GLB,,, | Performed by: AUDIOLOGIST

## 2022-05-18 PROCEDURE — 92567 PR TYMPA2METRY: ICD-10-PCS | Mod: S$GLB,,, | Performed by: AUDIOLOGIST

## 2022-05-18 PROCEDURE — 99203 OFFICE O/P NEW LOW 30 MIN: CPT | Mod: S$GLB,,, | Performed by: NURSE PRACTITIONER

## 2022-05-18 NOTE — PATIENT INSTRUCTIONS
Tinnitus (Ringing in the Ears)  Tinnitus is the term for a noise in your ear not caused by an outside sound. The noise might be a ringing, buzzing, hissing, roaring. It can vary in pitch and may be soft or quite loud. For some people, tinnitus is a minor nuisance. But for others, the noise can make it hard to hear, work, and even sleep. When tinnitus can't be cured, a number of treatments may offer relief.  What causes tinnitus?  Loud noises, hearing loss, and ear wax can cause tinnitus. So can certain medicines. Large amounts of aspirin or caffeine are sometimes to blame. In many cases, the exact cause of tinnitus is unknown.  How is tinnitus treated?  Identifying and removing the cause is the best way to treat tinnitus. For that reason, your healthcare provider may refer you to an otolaryngologist (ear, nose, and throat doctor). Your hearing may also be checked by an audiologist (hearing specialist). If you have hearing loss, wearing a hearing aid may help your tinnitus. When the cause can't be found, the tinnitus itself may be treated. Some of the treatments are listed below, and your healthcare provider can tell you more about them:  Avoid exposure to loud sounds, which will exacerbate tinnitus.  Wear ear protection around loud noises.  Get your blood pressure check regularly.  If it is high, see your doctor.  Check with your PCP whether labs can be done to check for anemia and hyperthyroid, as these can worsen tinnitus.   Decrease salt intake.  Avoid stimulants such as caffeine and tobacco.  Exercise daily to improve circulation. Poor circulation worsens tinnitus.   Avoid sleep deprivation. Sleep deprivation and insomnia can worsen tinnitus. Get adequate rest.  Reduce aspirin use, if possible. Aspirin as well as NSAIDs and narcotic pain relievers can exacerbate tinnitus.   Stop worrying about the noise.  Stress worsens tinnitus. Recognize the noise as an annoyance and learn to ignore it as much as possible.  Patients with higher rates of anxiety, depression, concentration, or problems sleeping may need to discuss taking something for anxiety or depression with their primary care provider.     Consider a trial of lipoflavonoids, slow-release niacin, or Arches' Tinnitus Formula (over-the-counter).  Purchase a white noise machine to mask tinnitus.  The Optimenga777 Tinnitus Relief kaylie on your smart phone uses a combination of sounds and relaxing exercises that aim to distract your brain from focusing on tinnitus. Over time the brain learns to focus less on the tinnitus.   When no underlying pathology can be identified, an audiogram is needed to screen for hearing loss. If hearing loss is noted, hearing aids with masking technology are recommended to help relieve tinnitus.   Maskers are small devices that look like hearing aids. They emit a pleasant sound that helps cover up the ringing in your ears, similar to the technology used in noise-cancelling headphones. Hearing aids and maskers are sometimes used together.  Cognitive Behavioral Therapy (CBT), otherwise known as Tinnitus Retraining Therapy (TRT), can be done by either an audiologist or a cognitive behavioral therapist who is certified in TRT. Tinnitus retraining therapy combines biofeedback, counseling and maskers.   Medicines that treat anxiety and depression may ease tinnitus in some people.  Hypnosis or relaxation therapy may help noise seem less severe.    First-line treatment for tinnitus:  Elimination of exacerbating factors such as elevated blood pressure, high sodium intake, caffeine/stimulants, sleep deprivation/insomnia, certain medications, aspirin, exposure to loud sounds, etc. White noise is recommended as first-line treatment.    Second-line treatment for tinnitus:  Maskers (with or without the use of a hearing aid depending on the outcome of your hearing test) and/or Cognitive Behavior Therapy (Tinnitus Retraining Therapy).  To find an audiologist or  Cognitive Behavioral Therapist in your area who is certified in Tinnitus Retraining Therapy, you must contact the American Tinnitus Association at 1-373.126.8526 or www.floyd.org.    For more information  American Speech-Hearing-Language Association [phone] www.autumn.org  American Tinnitus Association [phone] www.floyd.org  National [place] on Deafness and other Communication Disorders 098-099-2031 www.nidcd.nih.gov         Postviral olfactory dysfunction -- For all patients with postviral olfactory dysfunction (PVOD), we suggest smell, or olfactory, training, as this can improve the recovery of olfactory function in patients with persistent symptoms.    Olfactory training typically involves deeply sniffing at least four different odors for 10 seconds twice daily for at least 12 weeks. The odorants used are distinct and strong and typically include flowery (eg, yordan), fruity (eg, lemon), aromatic (eg, clove), and resinous (eg, eucalyptus). Other odorants such as cinnamon, vanilla, orange, and banana have been used. Patients may perform olfactory training at home on their own; preassembled smell training kits may be purchased, or vials of essential oils can be used for this purpose.    ?In a 2020 meta-analysis including four studies (two randomized controlled trials and two prospective cohort studies), 286 patients with PVOD received olfactory training of varying duration and treatment protocols, lasting from four to nine months. Olfactory training was associated with an increased chance of clinically important olfactory improvement compared with not receiving such therapy.    ?In a 2020 systemic review, olfactory training was effective in improving olfactory function in PVOD. In all 10 studies reviewed, olfactory training was found to improve olfactory function; higher concentrations of odorants, higher molecular weight odorants, longer duration of training (up to 52 weeks), and an increased variety of  odors used were all found to be most beneficial in improving olfactory function.    The proposed mechanism of action is that, with repeated olfactory stimulation, olfactory training can promote olfactory neuron regeneration or the creation of new synaptic connections.  For some patients with persistent, severe PVOD, the addition of budesonide nasal irrigations to olfactory training may further improve olfactory outcomes, although data are limited on this subset of patients.    Postviral olfactory dysfunction (PVOD) will typically improve over time, although symptoms may persist in some patients, even with olfactory training. Olfactory and taste impairment resulting from head trauma are less likely to resolve, although in some cases rapid improvement may occur with resolution of edema and hemorrhage. Olfactory neurons have regenerative capabilities, and, in cases of neural damage, improvement may occur over a period of days or even years.    In a longitudinal study of 542 patients with olfactory loss, improvement in olfactory function occurred in 57 percent of anosmic and 43 percent of microsmic patients over time (3 months to 24 years); complete recovery was seen in 11 and 23 percent of patients with anosmia and microsmia (hyposmia), respectively. Recovery was related to patient age, severity of initial loss, and duration of loss at first testing.

## 2022-05-18 NOTE — PROGRESS NOTES
Subjective:       Patient ID: Tanya Espinoza is a 62 y.o. female.    Chief Complaint: Tinnitus    HPI   Patient is new to ENT, referred by Dr. Miguel for consultation for dysosmia and tinnitus. Patient reports since having COVID, everything smells and tastes either rancid or like harsh chemicals. She is unable to enjoy eating and is now starting to lead to depression. Several recent imaging studies reviewed including CT, CTA, MRI.  Visualized paranasal sinuses and mastoids are clear.  Additionally patient reports bilateral tinnitus X 7-8 months. She states she has been told she suffered a couple of mini-strokes. She is now on blood thinner. She is on several new medications in the past several months.     Review of Systems   Constitutional: Negative.    HENT: Positive for tinnitus.    Eyes: Negative.    Respiratory: Negative.    Cardiovascular: Negative.    Gastrointestinal: Negative.    Musculoskeletal: Negative.    Integumentary:  Negative.   Neurological: Negative.         Disturbance of taste and smell   Hematological: Negative.    Psychiatric/Behavioral: Negative.          Objective:      Physical Exam  Vitals and nursing note reviewed.   Constitutional:       General: She is not in acute distress.     Appearance: She is well-developed. She is not ill-appearing or diaphoretic.   HENT:      Head: Normocephalic and atraumatic.      Right Ear: Hearing, tympanic membrane, ear canal and external ear normal. No middle ear effusion. Tympanic membrane is not erythematous.      Left Ear: Hearing, tympanic membrane, ear canal and external ear normal.  No middle ear effusion. Tympanic membrane is not erythematous.      Nose: Nose normal.      Mouth/Throat:      Pharynx: Uvula midline.   Eyes:      General: Lids are normal. No scleral icterus.        Right eye: No discharge.         Left eye: No discharge.   Neck:      Trachea: Trachea normal. No tracheal deviation.   Cardiovascular:      Rate and Rhythm: Normal  rate.   Pulmonary:      Effort: Pulmonary effort is normal. No respiratory distress.      Breath sounds: No stridor. No wheezing.   Musculoskeletal:         General: Normal range of motion.      Cervical back: Normal range of motion and neck supple.   Skin:     General: Skin is warm and dry.      Coloration: Skin is not pale.   Neurological:      Mental Status: She is alert and oriented to person, place, and time.      Coordination: Coordination normal.      Gait: Gait normal.   Psychiatric:         Speech: Speech normal.         Behavior: Behavior normal. Behavior is cooperative.         Thought Content: Thought content normal.         Judgment: Judgment normal.         Assessment:       Problem List Items Addressed This Visit    None     Visit Diagnoses     Dysgeusia        Sense of smell altered        Tinnitus aurium, bilateral              Plan:     Reassured pt sinuses are all open and clear. Dysosmia and dysgeusia not sinusitis related. Information given on post-viral olfactory retraining. Olfactory training typically involves deeply sniffing at least four different odors for 10 seconds twice daily for at least 12 weeks. The odorants used must be distinct and strong and typically include flowery (eg, yordan), fruity (eg, lemon), aromatic (eg, clove), and resinous (eg, eucalyptus). Other odorants such as cinnamon, vanilla, orange, and banana have been used. Patients may perform olfactory training at home on their own; preassembled smell training kits may be purchased, or vials of essential oils can be used for this purpose.    Reassured pt normal audiogram and negative aural exam. Tinnitus handout given and discussed at length. Discussed elimination of all exacerbating factors such as elevated blood pressure, high sodium intake, caffeine/stimulants, sleep deprivation/insomnia, certain medications, exposure to loud sounds, etc. Discussed white noise, hearing aids w/masking technology, and tinnitus retraining  therapy (TRT). All questions answered.   Patient encouraged to return to clinic if symptoms worsen/persist and as needed for further ENT symptoms or concerns.

## 2022-05-18 NOTE — PROGRESS NOTES
Tanya Espinoza was seen 05/18/2022 for an audiological evaluation.    Pt reported bilateral tinnitus mostly in her left ear.     Otoscopy revealed clear view of both external ear canals and tympanic membranes.  No obstructive cerumen present in either ear canal.       Audiogram results revealed essentially normal hearing bilaterally.  Speech Reception Thresholds were  15 dBHL for the right ear and 15 dBHL for the left ear.    Word recognition scores were excellent for the right ear and excellent for the left ear.   Tympanograms were Type A for the right ear and Type A for the left ear.    Audiogram results were reviewed in detail with patient and all questions were answered. Results will be reviewed by ENT at the completion of this note.     Recommend use of hearing protection devices when in the presence of loud sounds.

## 2022-05-18 NOTE — PROGRESS NOTES
Health Maintenance Due   Topic Date Due    COVID-19 Vaccine (1) Never done    Shingles Vaccine (1 of 2) Never done    Pneumococcal Vaccines (Age 0-64) (2 - PCV) 07/05/2018    LDCT Lung Screen  05/10/2022     Updates were requested from care everywhere.  Chart was reviewed for overdue Proactive [facility] Encounters (ALESSANDRO) topics (CRS, Breast Cancer Screening, Eye exam)  Health Maintenance has been updated.  LINKS immunization registry triggered.  Immunizations were reconciled.

## 2022-05-30 ENCOUNTER — PATIENT MESSAGE (OUTPATIENT)
Dept: NEUROLOGY | Facility: CLINIC | Age: 62
End: 2022-05-30
Payer: COMMERCIAL

## 2022-06-15 ENCOUNTER — PROCEDURE VISIT (OUTPATIENT)
Dept: NEUROLOGY | Facility: CLINIC | Age: 62
End: 2022-06-15
Payer: COMMERCIAL

## 2022-06-15 VITALS
DIASTOLIC BLOOD PRESSURE: 85 MMHG | HEIGHT: 68 IN | BODY MASS INDEX: 31.76 KG/M2 | SYSTOLIC BLOOD PRESSURE: 130 MMHG | WEIGHT: 209.56 LBS | HEART RATE: 101 BPM | RESPIRATION RATE: 17 BRPM

## 2022-06-15 DIAGNOSIS — G43.719 INTRACTABLE CHRONIC MIGRAINE WITHOUT AURA AND WITHOUT STATUS MIGRAINOSUS: Primary | ICD-10-CM

## 2022-06-15 PROCEDURE — 64615 CHEMODENERV MUSC MIGRAINE: CPT | Mod: S$GLB,,, | Performed by: PSYCHIATRY & NEUROLOGY

## 2022-06-15 PROCEDURE — 64615 PR CHEMODENERVATION OF MUSCLE FOR CHRONIC MIGRAINE: ICD-10-PCS | Mod: S$GLB,,, | Performed by: PSYCHIATRY & NEUROLOGY

## 2022-07-18 ENCOUNTER — TELEPHONE (OUTPATIENT)
Dept: INTERVENTIONAL RADIOLOGY/VASCULAR | Facility: CLINIC | Age: 62
End: 2022-07-18
Payer: COMMERCIAL

## 2022-07-18 NOTE — TELEPHONE ENCOUNTER
Left message with pt daughter to schedule pt for a follow up MRA and NeuroIR clinic visit. S/p cerebral angio 6/8/2021. Gave pt daughter my phone number [phone] for scheduling.

## 2022-08-08 DIAGNOSIS — I67.1 CEREBRAL ANEURYSM, NONRUPTURED: ICD-10-CM

## 2022-08-12 ENCOUNTER — TELEPHONE (OUTPATIENT)
Dept: INTERVENTIONAL RADIOLOGY/VASCULAR | Facility: CLINIC | Age: 62
End: 2022-08-12
Payer: COMMERCIAL

## 2022-08-12 NOTE — TELEPHONE ENCOUNTER
I have attempted to contact this patient by phone to schedule 1 year follow MRA and NeuroIR clinc visit.  No answer/Left message to return call to [phone].  Please forward call.

## 2022-08-24 ENCOUNTER — PATIENT MESSAGE (OUTPATIENT)
Dept: ADMINISTRATIVE | Facility: HOSPITAL | Age: 62
End: 2022-08-24
Payer: COMMERCIAL

## 2022-10-03 ENCOUNTER — HOSPITAL ENCOUNTER (OUTPATIENT)
Dept: RADIOLOGY | Facility: HOSPITAL | Age: 62
Discharge: HOME | End: 2022-10-03
Attending: RADIOLOGY
Payer: COMMERCIAL

## 2022-10-03 DIAGNOSIS — I67.1 CEREBRAL ANEURYSM, NONRUPTURED: ICD-10-CM

## 2022-10-03 PROCEDURE — 25500020 PHARM REV CODE 255: Performed by: RADIOLOGY

## 2022-10-03 PROCEDURE — 70546 MRA BRAIN WITH AND WITHOUT: ICD-10-PCS | Mod: 26,,, | Performed by: RADIOLOGY

## 2022-10-03 PROCEDURE — 70546 MR ANGIOGRAPH HEAD W/O&W/DYE: CPT | Mod: 26,,, | Performed by: RADIOLOGY

## 2022-10-03 PROCEDURE — A9585 GADOBUTROL INJECTION: HCPCS | Performed by: RADIOLOGY

## 2022-10-03 PROCEDURE — 70546 MR ANGIOGRAPH HEAD W/O&W/DYE: CPT | Mod: TC

## 2022-10-03 RX ORDER — GADOBUTROL 604.72 MG/ML
10 INJECTION INTRAVENOUS
Status: COMPLETED | OUTPATIENT
Start: 2022-10-03 | End: 2022-10-03

## 2022-10-03 RX ADMIN — GADOBUTROL 10 ML: 604.72 INJECTION INTRAVENOUS at 07:10

## 2022-10-10 ENCOUNTER — PATIENT MESSAGE (OUTPATIENT)
Dept: ADMINISTRATIVE | Facility: HOSPITAL | Age: 62
End: 2022-10-10
Payer: COMMERCIAL

## 2022-11-18 ENCOUNTER — PATIENT OUTREACH (OUTPATIENT)
Dept: ADMINISTRATIVE | Facility: HOSPITAL | Age: 62
End: 2022-11-18
Payer: COMMERCIAL

## 2022-11-18 NOTE — PROGRESS NOTES
CERVICAL CANCER SCREENING    Outreach to patient in reference to a routine PAP SMEAR EXAM    Chart review completed - Care Everywhere and Media reports - updates requested and reviewed.        Soma REVIEWED  Portal outreach un-read by patient.  Outreach mailed 11/18/2022

## 2022-11-18 NOTE — LETTER
November 18, 2022    Tanya Espinoza  [address] [place] [address]             [facility]  [address]  [place] [address]  Phone: [phone] Dear Tanya,     Your [facility] care is dedicated to helping you stay healthy with regular scheduled recommended screenings.  Scheduling routine screenings is important to maintaining good health. Our records indicate that you may be overdue for your screening pap smear.  Pap smear screening can help identify patients at risk for developing cervical cancer at an early stage when it is most likely to be successfully treated.    The current recommendation for a Pap smear screening is every 3-5 years.  We encourage you to schedule your appointment with your women's health provider.   Many women see a Gynecologist for this screening but some primary care providers also provide a Pap smear screening    If you recently had your Pap smear screening outside of [facility], please let your primary care team know so that they can update your health record.  Please send a message to your primary care physician via [email or web address] or contact his/her office at [phone].    If you have questions or want to schedule your screening, please call [phone] or send a message via [email or web address].       Sincerely,      Wendy Rodriguez DO and your [facility] Team

## 2023-02-08 ENCOUNTER — PATIENT MESSAGE (OUTPATIENT)
Dept: ADMINISTRATIVE | Facility: HOSPITAL | Age: 63
End: 2023-02-08
Payer: COMMERCIAL

## 2023-02-08 ENCOUNTER — PATIENT OUTREACH (OUTPATIENT)
Dept: ADMINISTRATIVE | Facility: HOSPITAL | Age: 63
End: 2023-02-08
Payer: COMMERCIAL

## 2023-02-08 DIAGNOSIS — Z12.31 OTHER SCREENING MAMMOGRAM: ICD-10-CM

## 2023-02-24 ENCOUNTER — PATIENT MESSAGE (OUTPATIENT)
Dept: ADMINISTRATIVE | Facility: HOSPITAL | Age: 63
End: 2023-02-24
Payer: COMMERCIAL

## 2023-02-24 ENCOUNTER — PATIENT OUTREACH (OUTPATIENT)
Dept: ADMINISTRATIVE | Facility: HOSPITAL | Age: 63
End: 2023-02-24
Payer: COMMERCIAL

## 2023-02-24 NOTE — PROGRESS NOTES
Uncontrolled BP REPORT  BP Readings from Last 3 Encounters:   06/15/22 130/85   03/23/22 110/72   03/02/22 127/79       Non-compliant report chart audits for HYPERTENSION MANAGEMENT     Outreach to patient in reference to hypertension management       BLOOD PRESSURE FOLLOW UP NEEDED WITH A CARE TEAM MEMBER    ACTIVE PORTAL MESSAGE OR LETTER SENT

## 2023-04-11 ENCOUNTER — PATIENT MESSAGE (OUTPATIENT)
Dept: ADMINISTRATIVE | Facility: HOSPITAL | Age: 63
End: 2023-04-11
Payer: COMMERCIAL

## 2023-09-13 NOTE — PROGRESS NOTES
"Subjective:    Patient ID:  Tanya Espinoza is a 62 y.o. female who presents for evaluation of PAF (Ref by Dr. Sol )      HPI 63 yo female with atrial fibrillation, Htn, ADHD, memory loss, cerebral aneurysm.  Primary cardiologist is Dr. Sol.  EP to date has been Dr. Castillo.  Has had paroxysmal symptomatic AF, s/p PVI x 2, with last one being 4/21.  Had challenges with recovery from the second ablation.  She believes she had recurrence, but "full-fledged." Remains on Flecainide and Bisoprolol.    12/21, Adderall discontinued. Has had significant neurocognitive decline since then.  Has had paroxysmal left sided weakness/numbness. Work-up has revealed no stroke, but an incidental cerebral aneurysm has been seen.    48 hr holter 1/28/22 nsr with occasional PVC's  LHC 5/6/21 non-obstructive CAD LVEDP 25 mm Hg  Echo 4/25/21 EF 50% mild to moderate MR        Review of Systems   Constitutional: Negative. Negative for fever and malaise/fatigue.   HENT: Negative for congestion and sore throat.    Cardiovascular: Negative for chest pain, dyspnea on exertion, irregular heartbeat, leg swelling, near-syncope, orthopnea, palpitations, paroxysmal nocturnal dyspnea and syncope.   Respiratory: Negative for cough and shortness of breath.    Gastrointestinal: Negative for abdominal pain, constipation and diarrhea.   Neurological: Negative for dizziness, light-headedness and weakness.   Psychiatric/Behavioral: Negative for depression. The patient is nervous/anxious.    All other systems reviewed and are negative.       Objective:    Physical Exam  Constitutional:       Appearance: She is well-developed and well-nourished.   Eyes:      General: No scleral icterus.     Conjunctiva/sclera: Conjunctivae normal.   Neck:      Vascular: No JVD.      Trachea: No tracheal deviation.   Cardiovascular:      Rate and Rhythm: Normal rate and regular rhythm.      Chest Wall: PMI is not displaced.   Pulmonary:      Effort: Pulmonary effort " is normal. No respiratory distress.      Breath sounds: Normal breath sounds.   Abdominal:      Palpations: Abdomen is soft. There is no hepatosplenomegaly.      Tenderness: There is no abdominal tenderness.   Musculoskeletal:         General: No tenderness or edema.   Skin:     General: Skin is warm and dry.      Findings: No rash.   Neurological:      Mental Status: She is alert and oriented to person, place, and time.   Psychiatric:         Mood and Affect: Mood and affect normal.         Behavior: Behavior normal.           Assessment:       1. Paroxysmal atrial fibrillation    2. Essential hypertension    3. Cerebral aneurysm    4. Attention deficit hyperactivity disorder (ADHD), unspecified ADHD type    5. Memory loss         Plan:             Paroxysmal AF, s/p PVI.  It is unclear as to whether she has had symptomatic recurrence.  Has cerebral aneurysm. It is unclear whether this renders anticoagulation a challenge long term.  Having significant cognitive challenges off Adderall.  Recommend:  Resume Adderall.  Obtain outside records.  Will D/w Dr. Clay re Neurosurgical evaluation to evaluate safety of anticoagulation. If considered a problem, consider OLIVIA exclusion.       Posterior Auricular Interpolation Flap Text: A decision was made to reconstruct the defect utilizing an interpolation axial flap and a staged reconstruction.  A telfa template was made of the defect.  This telfa template was then used to outline the posterior auricular interpolation flap.  The donor area for the pedicle flap was then injected with anesthesia.  The flap was excised through the skin and subcutaneous tissue down to the layer of the underlying musculature.  The pedicle flap was carefully excised within this deep plane to maintain its blood supply.  The edges of the donor site were undermined.   The donor site was closed in a primary fashion.  The pedicle was then rotated into position and sutured.  Once the tube was sutured into place, adequate blood supply was confirmed with blanching and refill.  The pedicle was then wrapped with xeroform gauze and dressed appropriately with a telfa and gauze bandage to ensure continued blood supply and protect the attached pedicle.

## 2023-10-06 DIAGNOSIS — I67.1 CEREBRAL ANEURYSM, NONRUPTURED: Primary | ICD-10-CM

## 2023-11-07 ENCOUNTER — HOSPITAL ENCOUNTER (OUTPATIENT)
Dept: RADIOLOGY | Facility: HOSPITAL | Age: 63
Discharge: HOME | End: 2023-11-07
Payer: COMMERCIAL

## 2023-11-07 DIAGNOSIS — I67.1 CEREBRAL ANEURYSM, NONRUPTURED: ICD-10-CM

## 2023-11-07 PROCEDURE — 70546 MR ANGIOGRAPH HEAD W/O&W/DYE: CPT | Mod: TC,PO

## 2023-11-07 PROCEDURE — 70546 MR ANGIOGRAPH HEAD W/O&W/DYE: CPT | Mod: 26,,, | Performed by: RADIOLOGY

## 2023-11-07 PROCEDURE — 25500020 PHARM REV CODE 255: Mod: PO

## 2023-11-07 PROCEDURE — 70546 MRA BRAIN WITH AND WITHOUT: ICD-10-PCS | Mod: 26,,, | Performed by: RADIOLOGY

## 2023-11-07 PROCEDURE — A9585 GADOBUTROL INJECTION: HCPCS | Mod: PO

## 2023-11-07 RX ORDER — GADOBUTROL 604.72 MG/ML
9 INJECTION INTRAVENOUS
Status: COMPLETED | OUTPATIENT
Start: 2023-11-07 | End: 2023-11-07

## 2023-11-07 RX ADMIN — GADOBUTROL 9 ML: 604.72 INJECTION INTRAVENOUS at 04:11

## 2023-11-13 ENCOUNTER — OFFICE VISIT (OUTPATIENT)
Dept: INTERVENTIONAL RADIOLOGY/VASCULAR | Facility: CLINIC | Age: 63
End: 2023-11-13
Payer: COMMERCIAL

## 2023-11-13 DIAGNOSIS — I67.1 CEREBRAL ANEURYSM, NONRUPTURED: Primary | ICD-10-CM

## 2023-11-13 PROCEDURE — 99213 PR OFFICE/OUTPT VISIT, EST, LEVL III, 20-29 MIN: ICD-10-PCS | Mod: 95,,,

## 2023-11-13 PROCEDURE — 99213 OFFICE O/P EST LOW 20 MIN: CPT | Mod: 95,,,

## 2023-11-13 NOTE — PROGRESS NOTES
"Subjective     Patient ID: Tanya Espinoza is a 63 y.o. female.    Chief Complaint: Follow-up    60 y/o s/p DCA 6/7/21 ([place]).       Cerebral aneurysm was noted during work up of stroke like symptoms following a cardiac ablation.  She was referred by Sammy Thomas DO.    Patient presents virtually to follow up on cerebral aneurysm. 2 year follow up MRA. She denies any weakness, headaches, or numbness.   She reports she does occasional get dizzy and has trouble with "impulse" which is chronic and an ongoing issue. Patient reports she has stopped smoking for approx 1 year now. No other complaints today.   Review of Systems   Constitutional:  Negative for fatigue and unexpected weight change.   Respiratory:  Negative for cough and shortness of breath.    Cardiovascular:  Negative for chest pain.   Neurological:  Positive for dizziness (occational). Negative for facial asymmetry, speech difficulty and weakness.   Psychiatric/Behavioral:  Negative for behavioral problems and confusion.         Objective     Physical Exam  Constitutional:       General: She is not in acute distress.     Appearance: Normal appearance.      Comments: Virtual Visit.    HENT:      Head: Normocephalic and atraumatic.      Nose: Nose normal.   Eyes:      Conjunctiva/sclera: Conjunctivae normal.   Pulmonary:      Effort: Pulmonary effort is normal.   Neurological:      General: No focal deficit present.      Mental Status: She is alert.   Psychiatric:         Mood and Affect: Mood normal.         Behavior: Behavior normal.            Assessment and Plan     1. Cerebral aneurysm, nonruptured    - Follow up MRA reviewed by Dr. Gomez. Aneurysm stable in size. Recommend 2 year MRA without contrast and clinic follow up.   - Follow up in 11/2025 or if clinical condition worsens.     The patient location is: [place]  The chief complaint leading to consultation is: Follow up to review imaging (no CC)    Visit type: audiovisual    20 minutes " of total time spent on the encounter, which includes face to face time and non-face to face time preparing to see the patient (eg, review of tests), Obtaining and/or reviewing separately obtained history, Documenting clinical information in the electronic or other health record, Independently interpreting results (not separately reported) and communicating results to the patient/family/caregiver, or Care coordination (not separately reported).       Each patient to whom he or she provides medical services by telemedicine is:  (1) informed of the relationship between the physician and patient and the respective role of any other health care provider with respect to management of the patient; and (2) notified that he or she may decline to receive medical services by telemedicine and may withdraw from such care at any time.    Theresa Tucker PA-C  Interventional Radiology  [phone]

## 2024-03-17 PROBLEM — R65.20 SEVERE SEPSIS: Status: ACTIVE | Noted: 2024-03-17

## 2024-03-17 PROBLEM — R73.9 HYPERGLYCEMIA: Status: ACTIVE | Noted: 2024-03-17

## 2024-03-17 PROBLEM — J18.9 PNEUMONIA: Status: ACTIVE | Noted: 2024-03-17

## 2024-03-17 PROBLEM — J96.01 ACUTE HYPOXEMIC RESPIRATORY FAILURE: Status: ACTIVE | Noted: 2024-03-17

## 2024-03-17 PROBLEM — A41.9 SEVERE SEPSIS: Status: ACTIVE | Noted: 2024-03-17

## 2024-03-18 PROBLEM — I47.29 NSVT (NONSUSTAINED VENTRICULAR TACHYCARDIA): Status: ACTIVE | Noted: 2024-03-18

## 2024-03-18 PROBLEM — J96.01 ACUTE RESPIRATORY FAILURE WITH HYPOXIA: Status: ACTIVE | Noted: 2024-03-18

## 2024-03-19 PROBLEM — I51.9 LV DYSFUNCTION: Status: ACTIVE | Noted: 2024-03-19

## 2024-03-19 PROBLEM — J96.01 ACUTE RESPIRATORY FAILURE WITH HYPOXIA: Status: RESOLVED | Noted: 2024-03-18 | Resolved: 2024-03-19

## 2024-03-20 PROBLEM — N17.9 AKI (ACUTE KIDNEY INJURY): Status: ACTIVE | Noted: 2024-03-20

## 2024-03-20 PROBLEM — L50.9 HIVES: Status: ACTIVE | Noted: 2024-03-20

## 2024-03-20 PROBLEM — I50.21 ACUTE SYSTOLIC HEART FAILURE: Status: ACTIVE | Noted: 2024-03-20

## 2024-03-20 PROBLEM — R00.1 BRADYCARDIA: Status: ACTIVE | Noted: 2024-03-20

## 2024-03-22 PROBLEM — Z75.8 DISCHARGE PLANNING ISSUES: Status: ACTIVE | Noted: 2024-03-22

## 2024-04-01 ENCOUNTER — OFFICE VISIT (OUTPATIENT)
Dept: FAMILY MEDICINE | Facility: CLINIC | Age: 64
End: 2024-04-01
Payer: COMMERCIAL

## 2024-04-01 VITALS
RESPIRATION RATE: 20 BRPM | HEIGHT: 68 IN | TEMPERATURE: 97 F | BODY MASS INDEX: 31.15 KG/M2 | SYSTOLIC BLOOD PRESSURE: 104 MMHG | HEART RATE: 88 BPM | DIASTOLIC BLOOD PRESSURE: 62 MMHG | OXYGEN SATURATION: 98 % | WEIGHT: 205.56 LBS

## 2024-04-01 DIAGNOSIS — E78.5 HYPERLIPIDEMIA, UNSPECIFIED HYPERLIPIDEMIA TYPE: ICD-10-CM

## 2024-04-01 DIAGNOSIS — G43.009 MIGRAINE WITHOUT AURA AND WITHOUT STATUS MIGRAINOSUS, NOT INTRACTABLE: ICD-10-CM

## 2024-04-01 DIAGNOSIS — Z12.11 SCREEN FOR COLON CANCER: ICD-10-CM

## 2024-04-01 DIAGNOSIS — K21.00 GASTROESOPHAGEAL REFLUX DISEASE WITH ESOPHAGITIS WITHOUT HEMORRHAGE: ICD-10-CM

## 2024-04-01 DIAGNOSIS — Z12.31 ENCOUNTER FOR SCREENING MAMMOGRAM FOR MALIGNANT NEOPLASM OF BREAST: ICD-10-CM

## 2024-04-01 DIAGNOSIS — E66.09 CLASS 1 OBESITY DUE TO EXCESS CALORIES WITH SERIOUS COMORBIDITY AND BODY MASS INDEX (BMI) OF 31.0 TO 31.9 IN ADULT: ICD-10-CM

## 2024-04-01 DIAGNOSIS — I25.10 CORONARY ARTERY DISEASE INVOLVING NATIVE CORONARY ARTERY OF NATIVE HEART WITHOUT ANGINA PECTORIS: Primary | ICD-10-CM

## 2024-04-01 DIAGNOSIS — I50.22 CHRONIC SYSTOLIC HEART FAILURE: ICD-10-CM

## 2024-04-01 DIAGNOSIS — I48.0 PAROXYSMAL ATRIAL FIBRILLATION: ICD-10-CM

## 2024-04-01 DIAGNOSIS — K76.0 FATTY LIVER: ICD-10-CM

## 2024-04-01 DIAGNOSIS — I67.1 NONRUPTURED CEREBRAL ANEURYSM: ICD-10-CM

## 2024-04-01 DIAGNOSIS — I10 PRIMARY HYPERTENSION: ICD-10-CM

## 2024-04-01 DIAGNOSIS — I42.8 NICM (NONISCHEMIC CARDIOMYOPATHY): ICD-10-CM

## 2024-04-01 DIAGNOSIS — Z23 NEED FOR VACCINATION AGAINST STREPTOCOCCUS PNEUMONIAE: ICD-10-CM

## 2024-04-01 LAB
ANION GAP SERPL CALC-SCNC: 8 MMOL/L (ref 8–16)
BNP SERPL-MCNC: 173 PG/ML (ref 0–99)
BUN SERPL-MCNC: 24 MG/DL (ref 8–23)
CALCIUM SERPL-MCNC: 9.4 MG/DL (ref 8.7–10.5)
CHLORIDE SERPL-SCNC: 104 MMOL/L (ref 95–110)
CHOLEST SERPL-MCNC: 195 MG/DL (ref 120–199)
CHOLEST/HDLC SERPL: 2.8 {RATIO} (ref 2–5)
CO2 SERPL-SCNC: 27 MMOL/L (ref 23–29)
CREAT SERPL-MCNC: 1 MG/DL (ref 0.5–1.4)
EST. GFR  (NO RACE VARIABLE): >60 ML/MIN/1.73 M^2
GLUCOSE SERPL-MCNC: 119 MG/DL (ref 70–110)
HDLC SERPL-MCNC: 69 MG/DL (ref 40–75)
HDLC SERPL: 35.4 % (ref 20–50)
LDLC SERPL CALC-MCNC: 86.2 MG/DL (ref 63–159)
NONHDLC SERPL-MCNC: 126 MG/DL
POTASSIUM SERPL-SCNC: 4.1 MMOL/L (ref 3.5–5.1)
SODIUM SERPL-SCNC: 139 MMOL/L (ref 136–145)
TRIGL SERPL-MCNC: 199 MG/DL (ref 30–150)

## 2024-04-01 PROCEDURE — 1111F DSCHRG MED/CURRENT MED MERGE: CPT | Mod: CPTII,S$GLB,, | Performed by: INTERNAL MEDICINE

## 2024-04-01 PROCEDURE — 3008F BODY MASS INDEX DOCD: CPT | Mod: CPTII,S$GLB,, | Performed by: INTERNAL MEDICINE

## 2024-04-01 PROCEDURE — 3044F HG A1C LEVEL LT 7.0%: CPT | Mod: CPTII,S$GLB,, | Performed by: INTERNAL MEDICINE

## 2024-04-01 PROCEDURE — 1160F RVW MEDS BY RX/DR IN RCRD: CPT | Mod: CPTII,S$GLB,, | Performed by: INTERNAL MEDICINE

## 2024-04-01 PROCEDURE — 36416 COLLJ CAPILLARY BLOOD SPEC: CPT | Mod: S$GLB,,, | Performed by: INTERNAL MEDICINE

## 2024-04-01 PROCEDURE — 4010F ACE/ARB THERAPY RXD/TAKEN: CPT | Mod: CPTII,S$GLB,, | Performed by: INTERNAL MEDICINE

## 2024-04-01 PROCEDURE — 80061 LIPID PANEL: CPT | Performed by: INTERNAL MEDICINE

## 2024-04-01 PROCEDURE — 99214 OFFICE O/P EST MOD 30 MIN: CPT | Mod: S$GLB,,, | Performed by: INTERNAL MEDICINE

## 2024-04-01 PROCEDURE — 3078F DIAST BP <80 MM HG: CPT | Mod: CPTII,S$GLB,, | Performed by: INTERNAL MEDICINE

## 2024-04-01 PROCEDURE — 83880 ASSAY OF NATRIURETIC PEPTIDE: CPT | Performed by: INTERNAL MEDICINE

## 2024-04-01 PROCEDURE — 80048 BASIC METABOLIC PNL TOTAL CA: CPT | Performed by: INTERNAL MEDICINE

## 2024-04-01 PROCEDURE — 1159F MED LIST DOCD IN RCRD: CPT | Mod: CPTII,S$GLB,, | Performed by: INTERNAL MEDICINE

## 2024-04-01 PROCEDURE — 3074F SYST BP LT 130 MM HG: CPT | Mod: CPTII,S$GLB,, | Performed by: INTERNAL MEDICINE

## 2024-04-01 RX ORDER — NAPROXEN SODIUM 220 MG
220 TABLET ORAL
COMMUNITY

## 2024-04-01 RX ORDER — PANTOPRAZOLE SODIUM 40 MG/1
40 TABLET, DELAYED RELEASE ORAL DAILY
Qty: 90 TABLET | Refills: 3 | Status: SHIPPED | OUTPATIENT
Start: 2024-04-01 | End: 2024-05-20 | Stop reason: SDUPTHER

## 2024-04-01 RX ORDER — SPIRONOLACTONE 25 MG/5ML
12.5 SUSPENSION ORAL DAILY
Qty: 90 EACH | Refills: 3 | Status: SHIPPED | OUTPATIENT
Start: 2024-04-01 | End: 2024-04-09

## 2024-04-01 RX ORDER — NITROGLYCERIN 0.4 MG/1
0.4 TABLET SUBLINGUAL EVERY 5 MIN PRN
Qty: 30 TABLET | Refills: 4 | Status: SHIPPED | OUTPATIENT
Start: 2024-04-01 | End: 2024-05-20 | Stop reason: SDUPTHER

## 2024-04-01 NOTE — PROGRESS NOTES
Subjective:       Patient ID: Tanya Espinoza is a 64 y.o. female.    Medication List with Changes/Refills   New Medications    NITROGLYCERIN (NITROSTAT) 0.4 MG SL TABLET    Place 1 tablet (0.4 mg total) under the tongue every 5 (five) minutes as needed for Chest pain.    PANTOPRAZOLE (PROTONIX) 40 MG TABLET    Take 1 tablet (40 mg total) by mouth once daily.   Current Medications    ASPIRIN (ECOTRIN) 81 MG EC TABLET    Take 1 tablet (81 mg total) by mouth once daily.    ATORVASTATIN (LIPITOR) 20 MG TABLET    Take 1 tablet (20 mg total) by mouth every evening.    FUROSEMIDE (LASIX) 40 MG TABLET    Take 1 tablet (40 mg total) by mouth once daily.    METOPROLOL SUCCINATE (TOPROL-XL) 25 MG 24 HR TABLET    Take 0.5 tablets (12.5 mg total) by mouth once daily.    MULTIVITAMIN WITH MINERALS TABLET    Take 1 tablet by mouth once daily.    NAPROXEN SODIUM (ANAPROX) 220 MG TABLET    Take 220 mg by mouth every 12 (twelve) hours.    NITROGLYCERIN (NITROSTAT) 0.4 MG SL TABLET    Place 1 tablet (0.4 mg total) under the tongue every 5 (five) minutes as needed for Chest pain.    SACUBITRIL-VALSARTAN (ENTRESTO) 24-26 MG PER TABLET    Take 1 tablet by mouth 2 (two) times daily.   Changed and/or Refilled Medications    Modified Medication Previous Medication    SPIRONOLACTONE (CAROSPIR) 25 MG/5 ML SUSP ORAL SUSP spironolactone (CAROSPIR) 25 mg/5 mL Susp oral susp       Take 2.5 mLs (12.5 mg total) by mouth once daily.    Take 2.5 mLs (12.5 mg total) by mouth once daily.   Discontinued Medications    ACETAMINOPHEN (TYLENOL) 325 MG TABLET    Take 2 tablets (650 mg total) by mouth every 4 (four) hours as needed for Pain.    AMOXICILLIN (AMOXIL) 500 MG TAB    Take 2 tablets (1,000 mg total) by mouth 3 (three) times daily. 1st dose 3/23/24 at 11 AM for 3 doses    DOXYCYCLINE (VIBRA-TABS) 100 MG TABLET    Take 1 tablet (100 mg total) by mouth every 12 (twelve) hours. 1st dose 3/22/24 at 9PM for 3 doses       Chief Complaint:  Follow-up  She is here today to f/u on chronic medical issues and recent hospital admission. She has not bee seen since 1/2022.     She was admitted to [facility] on 3/17/2024 for worsening shortness of breath. She was found to have bilateral pneumonia and started on oxygen, IV antibiotics and steroids. She continued with shortness of breath despite treatment and CT done showed multifocal consolidation with increased at RUL and bilateral pleural effusion right > left. Her BNP was elevated at 4190 and she was felt to be in volume overload. She was treated with IV lasix with good response.she had an episode of NSVT.  Echo showed cLVH, abnormal septal wall motion, EF 30%, no diastolic dysfunction, mild MR, mild to moderate TR and PAP of 30.  Subsequent cardiac cath showed non-obstructive disease unchanged from 2019 and EF of 40%.  She was felt to have a NICMO and her medications were changed (started on entresto, aldactone, metoprolol, aspirin and statin changed). She was discharged home.        She has Afib and is s/p RFA on 4/25/2021. She is followed by EP but has not been seen since 2/2022. At that time there was concern for use of eliquis with known cerebral aneurysm. She continues on aspirin for anticoagulation. She denies any palpitations. The question if she could safely take eliquis due to aneurysm has not been answered.     She has a new diagnosis of NICMO with reduced EF of 40% measured on cardiac cath on 3/2024, non-obstructing cad, and HTN.  She continues on spironolactone 12.5 mg daily, entresto 24-26 mg bid, toprol xl 12.5 mg daily and lasix 40 mg daily.  She denies chest pain or lower leg swelling. She having shortness of breath and is fatigued.  Her home BP runs /70 and she does feel lightheaded and off balance with the lower BPs.     She has hyperlipidemia and is taking atorvastatin 20 mg daily. Lipids on 2/2022 were 172/156/67/73. She is on aspirin daily.       She has a saccular basilar tip aneurysm  noted on MRA on 5/2021 (done for headaches and stroke like symptoms) that showed 3 mm aneurysm. Repeat MRA on 11/2023 was stable and advised by radiology to f/u in 2 years with repeat MRA.       She has elevated LFTs with labs on 3/2024 showing AST 35 and ALT 42. Ultrasound of the abdomen on 5/2021 that showed hepatomegaly and left hepatic lobe cyst.        She has GERD and is off treatment. She reports increasing indigestion with belching and chest discomfort.       She has a history of migraines that she describes as throbbing on left side involving her eye with associated light sensitivity and nausea. She has some visual changes. She was followed by neurology and underwent a series of botox injection. She feels this improved her headaches and denies any recurrence.       She lives with her  and feels safe at home. 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 She does not exercise. She does eat healthy.      Colonoscopy---never/refused   Mammogram----1/2022 neg---due   Pap-----8/2017 neg HPV neg  Tdap---8/2016  Influenza vaccine---refused    Pneumovax 23----7/2017  Shingrex vaccine-----yes per patient   Covid vaccine---refused  RSV vaccine----none     Review of Systems   Constitutional:  Positive for fatigue. Negative for appetite change, fever and unexpected weight change.   HENT:  Negative for congestion, ear pain, hearing loss, sore throat and trouble swallowing.    Eyes:  Negative for pain and visual disturbance.   Respiratory:  Positive for cough and shortness of breath. Negative for chest tightness and wheezing.    Cardiovascular:  Positive for palpitations and leg swelling. Negative for chest pain.   Gastrointestinal:  Negative for abdominal pain, blood in stool, constipation, diarrhea, nausea and vomiting.   Endocrine: Negative for polyuria.   Genitourinary:  Negative for dysuria and hematuria.   Musculoskeletal:  Negative for arthralgias, back pain and myalgias.   Skin:  Negative for rash.   Neurological:  Negative  "for dizziness, weakness, numbness and headaches.   Hematological:  Does not bruise/bleed easily.   Psychiatric/Behavioral:  Negative for dysphoric mood, sleep disturbance and suicidal ideas. The patient is not nervous/anxious.        Objective:      Vitals:    04/01/24 1141   BP: 104/62   BP Location: Right arm   Patient Position: Sitting   BP Method: X-Large (Manual)   Pulse: 88   Resp: 20   Temp: 97.2 °F (36.2 °C)   SpO2: 98%   Weight: 93.2 kg (205 lb 9.3 oz)   Height: 5' 8" (1.727 m)     Body mass index is 31.26 kg/m².  Physical Exam    General appearance: No acute distress, cooperative  Eyes: PERRL, EOMI, conjunctiva clear  Ears: normal external ear and pinna, tm clear without drainage, canals clear  Nose: Normal mucosa without drainage  Throat: no exudates or erythema, tonsils not enlarged  Mouth: no sores or lesions, moist mucous membranes  Neck: FROM, soft, supple, no thyromegaly, no bruits, no JVD  Lymph: no anterior or posterior cervical adenopathy  Heart::  Regular rate and rhythm, no murmur  Lung: Clear to ascultation bilaterally, no wheezing, no rales, no rhonchi, no distress  Abdomen: Soft, nontender, no distention, no hepatosplenomegaly, bowel sounds normal, no guarding, no rebound, no peritoneal signs  Skin: no rashes, no lesions  Extremities: no edema, no cyanosis  Neuro: CN 2-12 intact, 5/5 muscle strength upper and lower extremity bilaterally, 2+ DTRs UE and LE bilaterally, normal gait  Peripheral pulses: 2+ pedal pulses bilaterally, good perfusion and color  Musculoskeletal: FROM, good strenth, no tenderness  Joint: normal appearance, no swelling, no warmth, no deformity in all joints    Assessment:       1. Coronary artery disease involving native coronary artery of native heart without angina pectoris    2. NICM (nonischemic cardiomyopathy)    3. Chronic systolic heart failure    4. Paroxysmal atrial fibrillation    5. Primary hypertension    6. Hyperlipidemia, unspecified hyperlipidemia type  "   7. Nonruptured cerebral aneurysm    8. Gastroesophageal reflux disease with esophagitis without hemorrhage    9. Fatty liver    10. Migraine without aura and without status migrainosus, not intractable    11. Class 1 obesity due to excess calories with serious comorbidity and body mass index (BMI) of 31.0 to 31.9 in adult    12. Encounter for screening mammogram for malignant neoplasm of breast    13. Screen for colon cancer    14. Need for vaccination against Streptococcus pneumoniae        Plan:       Coronary artery disease involving native coronary artery of native heart without angina pectoris  Stable and no active symptoms. Recent cath was unchanged from 2019.   -     nitroGLYCERIN (NITROSTAT) 0.4 MG SL tablet; Place 1 tablet (0.4 mg total) under the tongue every 5 (five) minutes as needed for Chest pain.  Dispense: 30 tablet; Refill: 4    NICM (nonischemic cardiomyopathy) with chronic systolic heart failure  She is well compensated today but has some shortness of breath. She continues on lasix and aldactone, entresto and metoprolol.  Will check renal function today. She is due to see cardiology in 2 weeks.   -     Basic Metabolic Panel  -     Lipid Panel  -     B-TYPE NATRIURETIC PEPTIDE  -     spironolactone (CAROSPIR) 25 mg/5 mL Susp oral susp; Take 2.5 mLs (12.5 mg total) by mouth once daily.  Dispense: 90 each; Refill: 3    Paroxysmal atrial fibrillation  She is on aspirin for anticoagulation. Question if she would benefit from eliquis with paroxysmal Afib and reduced EF.     Primary hypertension  Low to normal on this regimen.  Continue current medications    Hyperlipidemia, unspecified hyperlipidemia type  Due to recheck lipids and she is taking atorvastatin and aspirin    Nonruptured cerebral aneurysm  MRA unchanged and advised to repeat in 11/2025.     Gastroesophageal reflux disease with esophagitis without hemorrhage  Uncontrolled and will start pantoprazole daily.   -     pantoprazole (PROTONIX)  40 MG tablet; Take 1 tablet (40 mg total) by mouth once daily.  Dispense: 90 tablet; Refill: 3    Fatty liver  Mildly elevated LFTs and encouraged weight loss    Migraine without aura and without status migrainosus, not intractable  Improved after botox and she denies any active symptoms.    Class 1 obesity due to excess calories with serious comorbidity and body mass index (BMI) of 31.0 to 31.9 in adult  Long discussion on the benefits of healthy eating and regular exercise to help lose weight and help control cad, hypertension and hyperlipidemia.     Encounter for screening mammogram for malignant neoplasm of breast  -     Mammo Digital Screening Bilat w/ Sean; Future; Expected date: 04/01/2024    Screen for colon cancer  -     Fecal Immunochemical Test (iFOBT); Future; Expected date: 04/01/2024    Need for vaccination against Streptococcus pneumoniae  -     (In Office Administered) Pneumococcal Conjugate Vaccine (20 Valent) (IM) (Preferred)    Follow up in about 4 weeks (around 4/29/2024) for recheck multiple medication issues (shortness of breath, chf) .

## 2024-04-02 ENCOUNTER — PATIENT MESSAGE (OUTPATIENT)
Dept: FAMILY MEDICINE | Facility: CLINIC | Age: 64
End: 2024-04-02
Payer: COMMERCIAL

## 2024-04-09 ENCOUNTER — OFFICE VISIT (OUTPATIENT)
Dept: CARDIOLOGY | Facility: CLINIC | Age: 64
End: 2024-04-09
Payer: COMMERCIAL

## 2024-04-09 VITALS
BODY MASS INDEX: 31.28 KG/M2 | DIASTOLIC BLOOD PRESSURE: 74 MMHG | HEIGHT: 68 IN | WEIGHT: 206.38 LBS | SYSTOLIC BLOOD PRESSURE: 106 MMHG | HEART RATE: 87 BPM

## 2024-04-09 DIAGNOSIS — I48.0 PAROXYSMAL ATRIAL FIBRILLATION: Primary | ICD-10-CM

## 2024-04-09 DIAGNOSIS — I44.7 LBBB (LEFT BUNDLE BRANCH BLOCK): ICD-10-CM

## 2024-04-09 DIAGNOSIS — I34.0 NONRHEUMATIC MITRAL VALVE REGURGITATION: ICD-10-CM

## 2024-04-09 DIAGNOSIS — I51.9 LV DYSFUNCTION: ICD-10-CM

## 2024-04-09 DIAGNOSIS — I25.10 CORONARY ARTERY DISEASE, UNSPECIFIED VESSEL OR LESION TYPE, UNSPECIFIED WHETHER ANGINA PRESENT, UNSPECIFIED WHETHER NATIVE OR TRANSPLANTED HEART: ICD-10-CM

## 2024-04-09 PROCEDURE — 1159F MED LIST DOCD IN RCRD: CPT | Mod: CPTII,S$GLB,, | Performed by: INTERNAL MEDICINE

## 2024-04-09 PROCEDURE — 3008F BODY MASS INDEX DOCD: CPT | Mod: CPTII,S$GLB,, | Performed by: INTERNAL MEDICINE

## 2024-04-09 PROCEDURE — 1111F DSCHRG MED/CURRENT MED MERGE: CPT | Mod: CPTII,S$GLB,, | Performed by: INTERNAL MEDICINE

## 2024-04-09 PROCEDURE — 3074F SYST BP LT 130 MM HG: CPT | Mod: CPTII,S$GLB,, | Performed by: INTERNAL MEDICINE

## 2024-04-09 PROCEDURE — 99999 PR PBB SHADOW E&M-EST. PATIENT-LVL III: CPT | Mod: PBBFAC,,, | Performed by: INTERNAL MEDICINE

## 2024-04-09 PROCEDURE — 4010F ACE/ARB THERAPY RXD/TAKEN: CPT | Mod: CPTII,S$GLB,, | Performed by: INTERNAL MEDICINE

## 2024-04-09 PROCEDURE — 99214 OFFICE O/P EST MOD 30 MIN: CPT | Mod: S$GLB,,, | Performed by: INTERNAL MEDICINE

## 2024-04-09 PROCEDURE — 3078F DIAST BP <80 MM HG: CPT | Mod: CPTII,S$GLB,, | Performed by: INTERNAL MEDICINE

## 2024-04-09 PROCEDURE — 3044F HG A1C LEVEL LT 7.0%: CPT | Mod: CPTII,S$GLB,, | Performed by: INTERNAL MEDICINE

## 2024-04-09 RX ORDER — DIGOXIN 125 MCG
125 TABLET ORAL DAILY
Qty: 30 TABLET | Refills: 11 | Status: SHIPPED | OUTPATIENT
Start: 2024-04-09 | End: 2024-05-20 | Stop reason: SDUPTHER

## 2024-04-09 NOTE — PROGRESS NOTES
Subjective:    Patient ID:  Tanya Espinoza is a 64 y.o. female who presents for follow-up of [facility]  Admitted to [facility] with worsening shortness of breath.  Echocardiogram showed ejection fraction less than 30%.  Angiogram revealed no significant CAD.  Medications were adjusted.  She comes with persistent shortness of breath with and without exertion.  She also stated that her blood pressure has been in the low side with lightheadedness and dizziness.  No syncope.  No palpitations.    Review of Systems   Constitutional: Positive for malaise/fatigue. Negative for decreased appetite, weight gain and weight loss.   Cardiovascular:  Positive for dyspnea on exertion. Negative for chest pain, leg swelling, palpitations and syncope.   Respiratory:  Positive for shortness of breath. Negative for cough.    Gastrointestinal: Negative.    Neurological:  Negative for weakness.   All other systems reviewed and are negative.       Objective:      Physical Exam  Vitals and nursing note reviewed.   Constitutional:       Appearance: Normal appearance. She is well-developed.   HENT:      Head: Normocephalic.   Eyes:      Pupils: Pupils are equal, round, and reactive to light.   Neck:      Thyroid: No thyromegaly.      Vascular: No carotid bruit or JVD.   Cardiovascular:      Rate and Rhythm: Normal rate and regular rhythm.      Chest Wall: PMI is not displaced.      Pulses: Normal pulses and intact distal pulses.      Heart sounds: Normal heart sounds. No murmur heard.     No gallop.   Pulmonary:      Effort: Pulmonary effort is normal.      Breath sounds: Normal breath sounds.   Abdominal:      Palpations: Abdomen is soft. There is no mass.      Tenderness: There is no abdominal tenderness.   Musculoskeletal:         General: Normal range of motion.      Cervical back: Normal range of motion and neck supple.   Skin:     General: Skin is warm.   Neurological:      Mental Status: She is alert and oriented to person,  place, and time.      Sensory: No sensory deficit.      Deep Tendon Reflexes: Reflexes are normal and symmetric.             Most Recent EKG Results  Results for orders placed or performed during the hospital encounter of 03/17/24   EKG 12-lead    Collection Time: 03/22/24  7:54 AM   Result Value Ref Range    QRS Duration 116 ms    [facility] QTC Calculation 470 ms    Narrative    Test Reason : R07.9,    Vent. Rate : 084 BPM     Atrial Rate : 084 BPM     P-R Int : 150 ms          QRS Dur : 116 ms      QT Int : 398 ms       P-R-T Axes : 068 012 174 degrees     QTc Int : 470 ms    Normal sinus rhythm  Cannot rule out Anterior infarct ,age undetermined  ST and T wave abnormality, consider inferolateral ischemia  Abnormal ECG  When compared with ECG of 18-MAR-2024 12:31,  Left bundle branch block is no longer Present  Minimal criteria for Anterior infarct are now Present  Confirmed by Nain Landeros ([id number]) on 3/28/2024 4:34:26 PM    Referred By: [email or web address]   SELF           Confirmed By:Nain Landeros       Most Recent Echocardiogram Results  Results for orders placed during the hospital encounter of 03/17/24    Echo    Interpretation Summary    Left Ventricle: The left ventricle is normal in size. There is concentric remodeling. Septal motion is abnormal. There is severely reduced systolic function with a visually estimated ejection fraction of less than 30%. There is normal diastolic function.    Right Ventricle: Normal right ventricular cavity size. Systolic function is normal.    Mitral Valve: There is mild regurgitation.    Tricuspid Valve: There is mild to moderate regurgitation.    Pulmonary Artery: The estimated pulmonary artery systolic pressure is 30 mmHg.    IVC/SVC: Normal venous pressure at 3 mmHg.      Most Recent Nuclear Stress Test Results  Results for orders placed during the hospital encounter of 04/24/21    Nuclear Stress - Cardiology Interpreted    Interpretation Summary    There is a mild intensity,  small sized, reversible defect that is consistent with ischemia in the anterobasilar wall(s).  Low risk scan    The gated perfusion images showed an ejection fraction of 52% at rest.    The EKG portion of this study is uninterpretable.    There were no arrhythmias during stress.      Most Recent Cardiac PET Stress Test Results  No results found for this or any previous visit.      Most Recent Cardiovascular Angiogram results  Results for orders placed during the hospital encounter of 03/17/24    Cardiac catheterization    Conclusion    There was non-obstructive coronary artery disease, unchanged compared to prior angiogram in 2021.    There was mild to moderate left ventricular systolic dysfunction.    The ejection fraction was calculated to be 40%.    The left ventricular end diastolic pressure was elevated.    The procedure log was documented by Documenter: Tomer Cortez RN and verified by Jorge Hinkle MD.    Date: 3/21/2024  Time: 10:05 AM      Other Most Recent Cardiology Results  Results for orders placed during the hospital encounter of 03/17/24    CARDIAC MONITORING STRIPS      Labs reviewed    Assessment:       1. Paroxysmal atrial fibrillation    2. LBBB (left bundle branch block)    3. Coronary artery disease, unspecified vessel or lesion type, unspecified whether angina present, unspecified whether native or transplanted heart    4. Nonrheumatic mitral valve regurgitation    5. LV dysfunction         Plan:   Stop Aldactone.    Start digoxin 0.125 p.o. q.day  Continue:  ARNi, ASA, Beta blocker, and Statin  Regular exercise program  Weight loss  Low cholesterol diet    Follow-up with Dr. Sol in 2-3 weeks

## 2024-04-18 ENCOUNTER — PATIENT MESSAGE (OUTPATIENT)
Dept: CARDIOLOGY | Facility: CLINIC | Age: 64
End: 2024-04-18
Payer: COMMERCIAL

## 2024-04-25 ENCOUNTER — TELEPHONE (OUTPATIENT)
Dept: CARDIOLOGY | Facility: CLINIC | Age: 64
End: 2024-04-25
Payer: COMMERCIAL

## 2024-04-26 ENCOUNTER — OFFICE VISIT (OUTPATIENT)
Dept: CARDIOLOGY | Facility: CLINIC | Age: 64
End: 2024-04-26
Payer: COMMERCIAL

## 2024-04-26 VITALS
BODY MASS INDEX: 30.87 KG/M2 | HEART RATE: 79 BPM | SYSTOLIC BLOOD PRESSURE: 115 MMHG | WEIGHT: 203.69 LBS | DIASTOLIC BLOOD PRESSURE: 74 MMHG | HEIGHT: 68 IN

## 2024-04-26 DIAGNOSIS — I70.90 ATHEROSCLEROSIS: ICD-10-CM

## 2024-04-26 DIAGNOSIS — I47.29 NSVT (NONSUSTAINED VENTRICULAR TACHYCARDIA): ICD-10-CM

## 2024-04-26 DIAGNOSIS — I44.7 LBBB (LEFT BUNDLE BRANCH BLOCK): ICD-10-CM

## 2024-04-26 DIAGNOSIS — J98.11 ATELECTASIS: ICD-10-CM

## 2024-04-26 DIAGNOSIS — R00.1 BRADYCARDIA: ICD-10-CM

## 2024-04-26 DIAGNOSIS — J18.9 PNEUMONIA DUE TO INFECTIOUS ORGANISM, UNSPECIFIED LATERALITY, UNSPECIFIED PART OF LUNG: ICD-10-CM

## 2024-04-26 DIAGNOSIS — R07.1 CHEST PAIN ON BREATHING: ICD-10-CM

## 2024-04-26 DIAGNOSIS — R94.39 ABNORMAL NUCLEAR STRESS TEST: ICD-10-CM

## 2024-04-26 DIAGNOSIS — J96.01 ACUTE HYPOXEMIC RESPIRATORY FAILURE: Primary | ICD-10-CM

## 2024-04-26 DIAGNOSIS — I48.0 PAROXYSMAL ATRIAL FIBRILLATION: ICD-10-CM

## 2024-04-26 DIAGNOSIS — Z72.0 TOBACCO ABUSE: ICD-10-CM

## 2024-04-26 PROCEDURE — 1159F MED LIST DOCD IN RCRD: CPT | Mod: CPTII,S$GLB,, | Performed by: INTERNAL MEDICINE

## 2024-04-26 PROCEDURE — 3008F BODY MASS INDEX DOCD: CPT | Mod: CPTII,S$GLB,, | Performed by: INTERNAL MEDICINE

## 2024-04-26 PROCEDURE — 99999 PR PBB SHADOW E&M-EST. PATIENT-LVL III: CPT | Mod: PBBFAC,,, | Performed by: INTERNAL MEDICINE

## 2024-04-26 PROCEDURE — 3078F DIAST BP <80 MM HG: CPT | Mod: CPTII,S$GLB,, | Performed by: INTERNAL MEDICINE

## 2024-04-26 PROCEDURE — 3074F SYST BP LT 130 MM HG: CPT | Mod: CPTII,S$GLB,, | Performed by: INTERNAL MEDICINE

## 2024-04-26 PROCEDURE — 99214 OFFICE O/P EST MOD 30 MIN: CPT | Mod: S$GLB,,, | Performed by: INTERNAL MEDICINE

## 2024-04-26 PROCEDURE — 3044F HG A1C LEVEL LT 7.0%: CPT | Mod: CPTII,S$GLB,, | Performed by: INTERNAL MEDICINE

## 2024-04-26 PROCEDURE — 4010F ACE/ARB THERAPY RXD/TAKEN: CPT | Mod: CPTII,S$GLB,, | Performed by: INTERNAL MEDICINE

## 2024-04-26 NOTE — PROGRESS NOTES
Subjective:    Patient ID:  Tanya Espinoza is a 64 y.o. female patient here for evaluation Follow-up      History of Present Illness:  Cardiology follow-up hospitalization for hypoxic respiratory failure, combination of both pneumonia and heart failure.  Patient experienced a run of nonsustained VT versus aberrantly conducted beats on telemetry during hospitalization resulting in abnormal nuclear perfusion imaging with with repeat left heart catheterization 03/2024 with nonobstructive CAD, similar left heart catheterization 2021.  Catheterization done because of diminished ejection fraction 30-40%.    Currently fatigued, shortness of breath.  Heart rate better controlled with the addition of digoxin.  No syncope/presyncope.  No angina             Review of patient's allergies indicates:   Allergen Reactions    Adhesive Rash    Effexor [venlafaxine] Other (See Comments)     Bruising all over body and elevated blood pressure       Past Medical History:   Diagnosis Date    ADHD (attention deficit hyperactivity disorder)     Anemia     Anticoagulant long-term use     Anxiety     Coronary artery disease     CAD    Elevated LFTs     elevated enzymes    GERD (gastroesophageal reflux disease) 4/24/2021    Headache     Hyperlipemia 4/24/2021    Hypertension     LBBB (left bundle branch block)     Lower extremity edema     Major depressive disorder, recurrent episode, mild 1/3/2022    Obesity     Paroxysmal A-fib     Skin cancer     skin ca     Past Surgical History:   Procedure Laterality Date    ABLATION  04/25/2021    to heart for a-fib x 2    CORONARY ANGIOGRAPHY N/A 5/6/2021    Procedure: ANGIOGRAM, CORONARY ARTERY;  Surgeon: Jesus Sol MD;  Location: [facility];  Service: Cardiology;  Laterality: N/A;    LEFT HEART CATHETERIZATION N/A 5/6/2021    Procedure: Left heart cath;  Surgeon: Jesus Sol MD;  [facility];  Service: Cardiology;  Laterality: N/A;    LEFT HEART CATHETERIZATION  3/21/2024     Procedure: Left heart cath;  Surgeon: Jorge Hinkle MD;  [facility];  Service: Cardiology;;    TONSILLECTOMY      TUBAL LIGATION       Social History     Tobacco Use    Smoking status: Former     Current packs/day: 0.00     Average packs/day: 1 pack/day for 40.0 years (40.0 ttl pk-yrs)     Types: Cigarettes     Quit date: 2023     Years since quittin.9    Smokeless tobacco: Never    Tobacco comments:     down to 0.25 pack of  cigarettes per day   Substance Use Topics    Alcohol use: Not Currently    Drug use: No        Review of Systems:    As noted in HPI in addition      REVIEW OF SYSTEMS  Review of Systems   Constitutional: Negative for decreased appetite, diaphoresis, night sweats, weight gain and weight loss.   HENT:  Negative for nosebleeds and odynophagia.    Eyes:  Negative for double vision and photophobia.   Cardiovascular:  Negative for chest pain, claudication, cyanosis, dyspnea on exertion, irregular heartbeat, leg swelling, near-syncope, orthopnea, palpitations, paroxysmal nocturnal dyspnea and syncope.   Respiratory:  Negative for cough, hemoptysis, shortness of breath and wheezing.    Hematologic/Lymphatic: Negative for adenopathy.   Skin:  Negative for flushing, skin cancer and suspicious lesions.   Musculoskeletal:  Negative for gout, myalgias and neck pain.   Gastrointestinal:  Negative for abdominal pain, heartburn, hematemesis and hematochezia.   Genitourinary:  Negative for bladder incontinence, hesitancy and nocturia.   Neurological:  Negative for focal weakness, headaches, light-headedness and paresthesias.   Psychiatric/Behavioral:  Negative for memory loss and substance abuse.               Objective:        Vitals:    24 1337   BP: 115/74   Pulse: 79       Lab Results   Component Value Date    WBC 5.88 2024    HGB 12.9 2024    HCT 39.3 2024     2024    CHOL 195 2024    TRIG 199 (H) 2024    HDL 69 2024    ALT  23 04/17/2024    AST 31 04/17/2024     04/17/2024    K 4.1 04/17/2024     04/17/2024    CREATININE 1.00 04/17/2024    BUN 15 04/17/2024    CO2 35 (H) 04/17/2024    TSH 3.530 03/18/2024    INR 1.0 05/14/2021    HGBA1C 5.4 03/17/2024        ECHOCARDIOGRAM RESULTS  Results for orders placed during the hospital encounter of 03/17/24    Echo    Interpretation Summary    Left Ventricle: The left ventricle is normal in size. There is concentric remodeling. Septal motion is abnormal. There is severely reduced systolic function with a visually estimated ejection fraction of less than 30%. There is normal diastolic function.    Right Ventricle: Normal right ventricular cavity size. Systolic function is normal.    Mitral Valve: There is mild regurgitation.    Tricuspid Valve: There is mild to moderate regurgitation.    Pulmonary Artery: The estimated pulmonary artery systolic pressure is 30 mmHg.    IVC/SVC: Normal venous pressure at 3 mmHg.    Results for orders placed during the hospital encounter of 03/17/24    Cardiac catheterization    Conclusion    There was non-obstructive coronary artery disease, unchanged compared to prior angiogram in 2021.    There was mild to moderate left ventricular systolic dysfunction.    The ejection fraction was calculated to be 40%.    The left ventricular end diastolic pressure was elevated.    The procedure log was documented by Documenter: Tomer Cortez RN and verified by Jorge Hinkle MD.    Date: 3/21/2024  Time: 10:05 AM          CURRENT/PREVIOUS VISIT EKG  Results for orders placed or performed during the hospital encounter of 04/17/24   EKG 12-lead    Collection Time: 04/17/24  1:26 PM   Result Value Ref Range    QRS Duration 112 ms    [facility] QTC Calculation 433 ms    Narrative    Test Reason : R07.9,    Vent. Rate : 079 BPM     Atrial Rate : 079 BPM     P-R Int : 178 ms          QRS Dur : 112 ms      QT Int : 378 ms       P-R-T Axes : 060 047 240 degrees      QTc Int : 433 ms    Normal sinus rhythm  Anterior infarct (cited on or before 22-MAR-2024)  ST and T wave abnormality, consider inferolateral ischemia  Abnormal ECG  When compared with ECG of 22-MAR-2024 07:54,  Serial changes of Anterior infarct Present  Confirmed by DEEPIKA ROSAS MD ([id number]) on 4/19/2024 7:58:17 AM    Referred By: GIRISH   SELF           Confirmed By:DEEPIKA ROSAS MD     No valid procedures specified.   Results for orders placed during the hospital encounter of 04/24/21    Nuclear Stress - Cardiology Interpreted    Interpretation Summary    There is a mild intensity, small sized, reversible defect that is consistent with ischemia in the anterobasilar wall(s).  Low risk scan    The gated perfusion images showed an ejection fraction of 52% at rest.    The EKG portion of this study is uninterpretable.    There were no arrhythmias during stress.    No valid procedures specified.    PHYSICAL EXAM  GENERAL: well built, well nourished, well-developed in no apparent distress alert and oriented.   HEENT: Normocephalic. Pupils normal and conjunctivae normal.  Mucous membranes normal, no cyanosis or icterus, trachea central,no pallor or icterus is noted..   NECK: No JVD. No bruit..   THYROID: Thyroid not enlarged. No nodules present..   CARDIAC:  Normal S1-S2.  No murmur rub click or gallop.  PMI nondisplaced.  CHEST ANATOMY: normal.   LUNGS: Clear to auscultation. No wheezing or rhonchi..   ABDOMEN: Soft no masses or organomegaly.  No abdomen pulsations or bruits.  Normal bowel sounds. No pulsations and no masses felt, No guarding or rebound.   URINARY: No carlsno catheter   EXTREMITIES: No cyanosis, clubbing or edema noted at this time., no calf tenderness bilaterally.   PERIPHERAL VASCULAR SYSTEM: Good palpable distal pulses.  2+ femoral, popliteal and pedal pulses.  No bruits    CENTRAL NERVOUS SYSTEM: No focal motor or sensory deficits noted.   SKIN: Skin without lesions, moist, well perfused.   MUSCLE  STRENGTH & TONE: No noteable weakness, atrophy or abnormal movement    I HAVE REVIEWED :    The vital signs, nurses notes, and all the pertinent radiology and labs.         Current Outpatient Medications   Medication Instructions    aspirin (ECOTRIN) 81 mg, Oral, Daily    atorvastatin (LIPITOR) 20 mg, Oral, Nightly    digoxin (LANOXIN) 125 mcg, Oral, Daily    furosemide (LASIX) 40 mg, Oral, Daily    metoprolol succinate (TOPROL-XL) 12.5 mg, Oral, Daily    multivitamin with minerals tablet 1 tablet, Oral, Daily    naproxen sodium (ANAPROX) 220 mg, Every 12 hours (non-standard times)    nitroGLYCERIN (NITROSTAT) 0.4 mg, Sublingual, Every 5 min PRN    pantoprazole (PROTONIX) 40 mg, Oral, Daily    sacubitriL-valsartan (ENTRESTO) 24-26 mg per tablet 1 tablet, Oral, 2 times daily          Assessment:   Nonischemic dilated cardiomyopathy, ejection fraction 30/ 40%, left heart catheterization 04/2024.    Dyslipidemia.    Atrial fibrillation/nonsustained SVT now back in normal sinus rhythm.  EKG done today. Status post remote ablation.  Rate control with chronic anticoagulation elected as treatment per last electrophysiology visit 2022.        Plan:     Patient on goal-directed medical management.  Meds reviewed and reconciled.  Recommend no changes.  REMAINS IN NORMAL SINUS RHYTHM.  Anticoagulation was deferred to clinic follow-up,     3 months  Repeat labs and echo.      No follow-ups on file.

## 2024-04-29 LAB
OHS QRS DURATION: 122 MS
OHS QTC CALCULATION: 435 MS

## 2024-05-02 ENCOUNTER — OFFICE VISIT (OUTPATIENT)
Dept: FAMILY MEDICINE | Facility: CLINIC | Age: 64
End: 2024-05-02
Payer: COMMERCIAL

## 2024-05-02 VITALS
DIASTOLIC BLOOD PRESSURE: 60 MMHG | SYSTOLIC BLOOD PRESSURE: 90 MMHG | HEIGHT: 68 IN | WEIGHT: 204.38 LBS | TEMPERATURE: 98 F | BODY MASS INDEX: 30.98 KG/M2 | OXYGEN SATURATION: 95 % | HEART RATE: 95 BPM | RESPIRATION RATE: 17 BRPM

## 2024-05-02 DIAGNOSIS — I48.0 PAROXYSMAL ATRIAL FIBRILLATION: ICD-10-CM

## 2024-05-02 DIAGNOSIS — K21.9 GASTROESOPHAGEAL REFLUX DISEASE WITHOUT ESOPHAGITIS: Primary | ICD-10-CM

## 2024-05-02 DIAGNOSIS — I42.8 NICM (NONISCHEMIC CARDIOMYOPATHY): ICD-10-CM

## 2024-05-02 DIAGNOSIS — I10 PRIMARY HYPERTENSION: ICD-10-CM

## 2024-05-02 DIAGNOSIS — I50.22 CHRONIC SYSTOLIC HEART FAILURE: ICD-10-CM

## 2024-05-02 DIAGNOSIS — I25.10 CORONARY ARTERY DISEASE INVOLVING NATIVE CORONARY ARTERY OF NATIVE HEART WITHOUT ANGINA PECTORIS: ICD-10-CM

## 2024-05-02 PROCEDURE — 3078F DIAST BP <80 MM HG: CPT | Mod: CPTII,S$GLB,, | Performed by: INTERNAL MEDICINE

## 2024-05-02 PROCEDURE — 3008F BODY MASS INDEX DOCD: CPT | Mod: CPTII,S$GLB,, | Performed by: INTERNAL MEDICINE

## 2024-05-02 PROCEDURE — 1159F MED LIST DOCD IN RCRD: CPT | Mod: CPTII,S$GLB,, | Performed by: INTERNAL MEDICINE

## 2024-05-02 PROCEDURE — 99214 OFFICE O/P EST MOD 30 MIN: CPT | Mod: S$GLB,,, | Performed by: INTERNAL MEDICINE

## 2024-05-02 PROCEDURE — 1160F RVW MEDS BY RX/DR IN RCRD: CPT | Mod: CPTII,S$GLB,, | Performed by: INTERNAL MEDICINE

## 2024-05-02 PROCEDURE — 3044F HG A1C LEVEL LT 7.0%: CPT | Mod: CPTII,S$GLB,, | Performed by: INTERNAL MEDICINE

## 2024-05-02 PROCEDURE — 4010F ACE/ARB THERAPY RXD/TAKEN: CPT | Mod: CPTII,S$GLB,, | Performed by: INTERNAL MEDICINE

## 2024-05-02 PROCEDURE — 3074F SYST BP LT 130 MM HG: CPT | Mod: CPTII,S$GLB,, | Performed by: INTERNAL MEDICINE

## 2024-05-02 RX ORDER — FAMOTIDINE 40 MG/1
40 TABLET, FILM COATED ORAL NIGHTLY
Qty: 90 TABLET | Refills: 3 | Status: SHIPPED | OUTPATIENT
Start: 2024-05-02 | End: 2025-05-02

## 2024-05-02 RX ORDER — TRAMADOL HYDROCHLORIDE 50 MG/1
50 TABLET ORAL EVERY 6 HOURS PRN
COMMUNITY
Start: 2024-04-02

## 2024-05-02 NOTE — PROGRESS NOTES
Subjective:       Patient ID: Tanya Espinoza is a 64 y.o. female.    Medication List with Changes/Refills   New Medications    FAMOTIDINE (PEPCID) 40 MG TABLET    Take 1 tablet (40 mg total) by mouth every evening.   Current Medications    ASPIRIN (ECOTRIN) 81 MG EC TABLET    Take 1 tablet (81 mg total) by mouth once daily.    ATORVASTATIN (LIPITOR) 20 MG TABLET    Take 1 tablet (20 mg total) by mouth every evening.    DIGOXIN (LANOXIN) 125 MCG TABLET    Take 1 tablet (125 mcg total) by mouth once daily.    FUROSEMIDE (LASIX) 40 MG TABLET    Take 1 tablet (40 mg total) by mouth once daily.    METOPROLOL SUCCINATE (TOPROL-XL) 25 MG 24 HR TABLET    Take 0.5 tablets (12.5 mg total) by mouth once daily.    MULTIVITAMIN WITH MINERALS TABLET    Take 1 tablet by mouth once daily.    NAPROXEN SODIUM (ANAPROX) 220 MG TABLET    Take 220 mg by mouth every 12 (twelve) hours.    NITROGLYCERIN (NITROSTAT) 0.4 MG SL TABLET    Place 1 tablet (0.4 mg total) under the tongue every 5 (five) minutes as needed for Chest pain.    PANTOPRAZOLE (PROTONIX) 40 MG TABLET    Take 1 tablet (40 mg total) by mouth once daily.    SACUBITRIL-VALSARTAN (ENTRESTO) 24-26 MG PER TABLET    Take 1 tablet by mouth 2 (two) times daily.    TRAMADOL (ULTRAM) 50 MG TABLET    Take 50 mg by mouth every 6 (six) hours as needed.   Discontinued Medications    METOPROLOL SUCCINATE (TOPROL-XL) 12.5 MG 24 HR SPLIT TABLET           Chief Complaint: Follow-up  She is here today to f/u on her appt on 4/1/2024.     Recall on 3/2024 she had an episode of NSVT in the setting of bilateral pneumonia and found to have HFrEF to 30% and Afib. Repeat cath at that time was unchanged showing non-obstructive disease. She was seen again in ER on 4/17/2024 for chest pain and EKG and CE were reassuring. Chest pain felt to be due to dyspepsia.  She was seen by cardiology on 4/26/2024 who advised no changes to her medication regimen or changes to her anticoagulation. She continues on  "digoxin 125 mcg daily, entresto 24-26 mg daily, toprol xl 12.5 mg daily, lasix 40 mg daily and aspirin daily. She continues with intermittent episodes of palpitations and shortness of breath.  She is very fatigued and lightheaded. She is struggling to go back to work.     She has GERD that was uncontrolled and was started on pantoprazole 40 mg daily on her appt on 4/1/2024. Despite treatment she continues with heartburn and belching.  No blood in stool. No epigastric pain. No fevers. No nausea or vomiting. She has abdominal distention.     Review of Systems   Constitutional:  Positive for fatigue. Negative for appetite change, fever and unexpected weight change.   HENT:  Negative for congestion, ear pain, hearing loss, sore throat and trouble swallowing.    Eyes:  Negative for pain and visual disturbance.   Respiratory:  Positive for cough. Negative for chest tightness, shortness of breath and wheezing.    Cardiovascular:  Negative for chest pain, palpitations and leg swelling.   Gastrointestinal:  Positive for abdominal distention. Negative for abdominal pain, blood in stool, constipation, diarrhea, nausea and vomiting.   Endocrine: Negative for polyuria.   Genitourinary:  Negative for dysuria and hematuria.   Musculoskeletal:  Negative for arthralgias, back pain and myalgias.   Skin:  Negative for rash.   Neurological:  Positive for weakness and light-headedness. Negative for dizziness, numbness and headaches.   Hematological:  Does not bruise/bleed easily.   Psychiatric/Behavioral:  Negative for dysphoric mood, sleep disturbance and suicidal ideas. The patient is not nervous/anxious.        Objective:      Vitals:    05/02/24 1307   BP: 90/60   BP Location: Right arm   Patient Position: Sitting   BP Method: Medium (Manual)   Pulse: 95   Resp: 17   Temp: 98.2 °F (36.8 °C)   SpO2: 95%   Weight: 92.7 kg (204 lb 5.9 oz)   Height: 5' 8" (1.727 m)     Body mass index is 31.07 kg/m².  Physical Exam    General " appearance: No acute distress, cooperative  Neck: FROM, soft, supple, no thyromegaly, no bruits, no JVD   Lymph: no anterior or posterior cervical adenopathy  Heart::  Regular rate and rhythm, no murmur  Lung: Clear to ascultation bilaterally, no wheezing, no rales, no rhonchi, no distress  Abdomen: Soft, mild epigastric abdominal tenderness and mild distention, no hepatosplenomegaly, bowel sounds normal, no guarding, no rebound, no peritoneal signs  Skin: no rashes, no lesions  Extremities: no edema, no cyanosis  Neuro: no focal abnormalities, strength 5/5 b/l UE and LE, 2+ DTRs b/l UE and LE, normal gait  Peripheral pulses: 2+ pedal pulses bilaterally  Musculoskeletal: FROM, good strenth, no tenderness  Joint: normal appearance, no swelling, no warmth, no deformity in all joints      Assessment:       1. Gastroesophageal reflux disease without esophagitis    2. Coronary artery disease involving native coronary artery of native heart without angina pectoris    3. NICM (nonischemic cardiomyopathy)    4. Chronic systolic heart failure    5. Paroxysmal atrial fibrillation    6. Primary hypertension        Plan:       Gastroesophageal reflux disease without esophagitis  Uncontrolled and will add famotidine 40 mg at night and continue pantoprazole 40 mg in am.  Referral to GI for consideration of an EGD.  Will check stool for h   pylori antigen.   -     famotidine (PEPCID) 40 MG tablet; Take 1 tablet (40 mg total) by mouth every evening.  Dispense: 90 tablet; Refill: 3  -     Ambulatory referral/consult to Gastroenterology; Future; Expected date: 05/09/2024  -     H. pylori antigen, stool; Future; Expected date: 05/02/2024    Coronary artery disease involving native coronary artery of native heart without angina pectoris  Stable and recent angiogram is unchanged    NICM (nonischemic cardiomyopathy) with Chronic systolic heart failure  Well compensated and signs of failure on exam today. Continue current regimen and will  f/u with cardiology in 3 months to repeat her echo    Paroxysmal atrial fibrillation  NSR today and continue to monitor via pacer. Continue on digoxin and aspirin    Primary hypertension  BP low due to heart medications.  I feel this is contributing to her fatigue. Continue to monitor.     Follow up in about 3 months (around 8/2/2024) for chronic medical issues.

## 2024-05-03 ENCOUNTER — OFFICE VISIT (OUTPATIENT)
Dept: GASTROENTEROLOGY | Facility: CLINIC | Age: 64
End: 2024-05-03
Payer: COMMERCIAL

## 2024-05-03 VITALS — BODY MASS INDEX: 31.17 KG/M2 | WEIGHT: 205.69 LBS | HEIGHT: 68 IN

## 2024-05-03 DIAGNOSIS — R10.10 UPPER ABDOMINAL PAIN: ICD-10-CM

## 2024-05-03 DIAGNOSIS — R19.4 CHANGE IN BOWEL HABITS: ICD-10-CM

## 2024-05-03 DIAGNOSIS — Z86.79 HISTORY OF CONGESTIVE HEART FAILURE: ICD-10-CM

## 2024-05-03 DIAGNOSIS — R13.19 ESOPHAGEAL DYSPHAGIA: ICD-10-CM

## 2024-05-03 DIAGNOSIS — Z79.01 ANTICOAGULANT LONG-TERM USE: ICD-10-CM

## 2024-05-03 DIAGNOSIS — R12 HEARTBURN: Primary | ICD-10-CM

## 2024-05-03 DIAGNOSIS — R19.7 DIARRHEA, UNSPECIFIED TYPE: ICD-10-CM

## 2024-05-03 DIAGNOSIS — Z87.898 HISTORY OF SHORTNESS OF BREATH: ICD-10-CM

## 2024-05-03 DIAGNOSIS — R10.33 PERIUMBILICAL ABDOMINAL PAIN: ICD-10-CM

## 2024-05-03 PROCEDURE — 99204 OFFICE O/P NEW MOD 45 MIN: CPT | Mod: S$GLB,,, | Performed by: NURSE PRACTITIONER

## 2024-05-03 PROCEDURE — 4010F ACE/ARB THERAPY RXD/TAKEN: CPT | Mod: CPTII,S$GLB,, | Performed by: NURSE PRACTITIONER

## 2024-05-03 PROCEDURE — 1159F MED LIST DOCD IN RCRD: CPT | Mod: CPTII,S$GLB,, | Performed by: NURSE PRACTITIONER

## 2024-05-03 PROCEDURE — 99999 PR PBB SHADOW E&M-EST. PATIENT-LVL IV: CPT | Mod: PBBFAC,,, | Performed by: NURSE PRACTITIONER

## 2024-05-03 PROCEDURE — 3044F HG A1C LEVEL LT 7.0%: CPT | Mod: CPTII,S$GLB,, | Performed by: NURSE PRACTITIONER

## 2024-05-03 PROCEDURE — 3008F BODY MASS INDEX DOCD: CPT | Mod: CPTII,S$GLB,, | Performed by: NURSE PRACTITIONER

## 2024-05-03 PROCEDURE — 1160F RVW MEDS BY RX/DR IN RCRD: CPT | Mod: CPTII,S$GLB,, | Performed by: NURSE PRACTITIONER

## 2024-05-03 NOTE — PATIENT INSTRUCTIONS
Acid Reflux and GERD in Adults Discharge Instructions   About this topic   GERD stands for gastroesophageal reflux disease. It is sometimes called reflux or acid reflux. Acid reflux happens when your stomach acid backs up into your esophagus, the tube that carries your food from your mouth to your stomach. This can be uncomfortable. You may have stomach or chest pain (heartburn), trouble swallowing, or an upset stomach. Some people have a cough or sore throat.  Most of the time, you can use over-the-counter medicines to help with this problem.       What care is needed at home?   Ask your doctor what you need to do when you go home. Make sure you ask questions if you do not understand what the doctor says.  Raise the head of your bed by 6 to 8 inches (15 to 20 cm). Use wood or rubber blocks under 2 legs or try a foam wedge under your mattress. Just sleeping with your head raised on pillows is not enough.  Avoid beer, wine, and mixed drinks and avoid caffeine.  Keep a healthy weight. If you are too heavy, lose weight.  If you smoke, try to quit. Your doctor or nurse can help.  Keep a diary of your signs. Write down what you had to eat before you had reflux. This will help you learn which foods cause you problems. For some people, they need to avoid coffee, chocolate, alcohol, spicy or fatty foods, or peppermint.  Avoid eating for 2 to 3 hours before bedtime. Lying down after you eat can make reflux worse.  Avoid belts and clothes that are too tight.  What follow-up care is needed?   Your doctor may ask you to make visits to the office to check on your progress. Be sure to keep these visits.  What drugs may be needed?   The doctor may order drugs to:  Relieve heartburn  Prevent reflux  Lessen acid production  Heal the esophageal lining  Will physical activity be limited?   Your physical activities will not be limited.  What problems could happen?   Asthma  Precancerous changes in the food pipe  Long-term cough  Dental  problems  Higher risk of cancer of the food pipe. This is esophageal cancer.  Narrowing of the food pipe. This is a stricture.  Open sore in the food pipe. This is an ulcer.  When do I need to call the doctor?   You have signs of a heart attack, which may include:  Severe chest pain, pressure, or discomfort with:  Breathing trouble, sweating, upset stomach, or cold, clammy skin.  Pain in your arms, back, or jaw.  Worse pain with activity like walking up stairs.  Fast or irregular heartbeat.  Feeling dizzy, faint, or weak.  You have sudden, severe belly pain or the belly pain is constant.  You have blood in the undigested food and acid that comes up, or stool that looks red, black, or like tar.  You feel like your food gets stuck or you have pain when you swallow.  You lose weight when you are not trying to.  You choke when you are eating.  Your reflux is very bad, very frequent, or not helped by over-the-counter medicines.  You keep throwing up.  Teach Back: Helping You Understand   The Teach Back Method helps you understand the information we are giving you. After you talk with the staff, tell them in your own words what you learned. This helps to make sure the staff has described each thing clearly. It also helps to explain things that may have been confusing. Before going home, make sure you can do these:  I can tell you about my condition.  I can tell you what changes I need to make with my eating habits to ease the reflux.  I can tell you what I will do if I am throwing up fluid that looks like blood or coffee grounds.  Where can I learn more?   American [employer]  [email or web address]   [facility] Choices  https://www.nhs.uk/conditions/heartburn-and-acid-reflux/   Last Reviewed Date   2021-06-09  Consumer Information Use and Disclaimer   This information is not specific medical advice and does not replace information you receive from your health care provider. This  "is only a brief summary of general information. It does NOT include all information about conditions, illnesses, injuries, tests, procedures, treatments, therapies, discharge instructions or life-style choices that may apply to you. You must talk with your health care provider for complete information about your health and treatment options. This information should not be used to decide whether or not to accept your health care providers advice, instructions or recommendations. Only your health care provider has the knowledge and training to provide advice that is right for you.  Copyright   Copyright © 2021 UpToDate, Inc. and its affiliates and/or licensors. All rights reserved.     Severe Abdominal Pain   The Basics   Written by the doctors and editors at [employer]   Are there different types of abdominal pain? -- Yes. "Abdominal pain" means pain in the abdomen (or belly), which is the part of the body between the chest and the genital area. This pain can happen for different reasons. It can be "chronic," which means it develops over time, or "acute," which means it starts suddenly. It can be mild or severe. A person might feel the pain all over their abdomen, or only in 1 part.   Abdominal pain can feel different for different people. It can feel sharp or crampy, or dull and steady. Some people feel better if they curl into a ball, while others need to lie flat and completely still. People often feel sick to their stomach and retch or vomit.  Doctors use the term "acute abdomen" to describe an episode of severe abdominal pain that starts suddenly and lasts for a few hours or longer. It can cause pain so severe that the person has a hard time moving or breathing and it makes them want to go to the hospital or see their doctor or nurse right away. A true acute abdomen is a medical emergency.  What causes abdominal pain? -- Lots of different things can cause abdominal pain. When pain is less severe, it can be due to " "something like a virus or a stomach inflammation (called "gastritis").  Acute pain that is more severe can be caused by problems with 1 or more organs in the abdomen. Organs in the abdomen can be part of the digestive, urinary, or reproductive systems (figure 1 and figure 2 and figure 3).  Conditions that affect organs in the chest or genital area can also cause pain. Even though these organs aren't in the abdomen, people might still have abdominal pain.  Common causes of acute abdominal pain in adults include:  Appendicitis - Appendicitis is the term for when the appendix (a long, thin pouch that hangs down from the large intestine) gets infected and inflamed.  Diverticulitis - Diverticulitis is an infection that develops in small pouches that can form in the intestine. This is common in older people.  Gallstones - Gallstones are small stones that form inside an organ called the gallbladder, which stores bile, a fluid that helps the body break down fat.  Abscess - An abscess is a collection of pus. An abscess can form in the abdomen, typically near the intestine.  Kidney stones - Kidney stones can form when salts and minerals that are normally in the urine build up and harden. They can cause pain when they pass through the ureters, which are the tubes that carry urine from the kidney to the bladder.  Bowel perforation - This is a hole in the bowel wall.  Perforated ulcer - This is a hole in the wall of the stomach or intestine.  Pancreatitis - This is the term for when the pancreas gets inflamed.  Ruptured cyst in the ovary - Cysts in the ovary are fluid-filled sacs that can form in some women. They sometimes rupture, which means that they break open and spill out.  Ectopic pregnancy - An ectopic pregnancy is a pregnancy that develops outside the uterus.  Should I see a doctor or nurse? -- Yes. If you have sudden or severe abdominal pain, call your doctor or nurse or go to the hospital right away. Depending on the " cause of your pain, you might need immediate treatment.  Will I need tests? -- Probably. The doctor or nurse will ask about your symptoms, including where your pain is and what it feels like. The location of the pain can be an important clue to the cause.  Your doctor will ask about your current and past medical conditions, and do a physical exam. They might do repeat exams over time to follow your symptoms.  Your doctor will decide which tests you should have based on your symptoms and individual situation. The tests might include:  Blood tests  Urine tests  X-rays  An ultrasound, CT scan, or other imaging test - Imaging tests create pictures of the inside of the body.  How is abdominal pain treated? -- Treatment depends on what's causing the pain. It might include 1 or more of the following:  Fluids given by IV  Pain medicines  Antibiotic medicines to treat an infection  Other medicines to treat other medical conditions  Surgery  All topics are updated as new evidence becomes available and our peer review process is complete.  This topic retrieved from [employer] on: Sep 21, 2021.  Topic 82690 Version 12.0  Release: 29.4.2 - C29.263  © 2021 UpToDate, Inc. and/or its affiliates. All rights reserved.  figure 1: Organs inside the abdomen (belly)     Graphic 91811 Version 6.0    figure 2: Anatomy of the urinary tract     Urine is made by the kidneys. It passes from the kidneys into the bladder through two tubes called the ureters. Then it leaves the bladder through another tube called the urethra.  Graphic 13955 Version 7.0    figure 3: Female reproductive anatomy     These are the internal organs that make up the female reproductive system.  Graphic 23050 Version 6.0    Consumer Information Use and Disclaimer   This information is not specific medical advice and does not replace information you receive from your health care provider. This is only a brief summary of general information. It does NOT include all  information about conditions, illnesses, injuries, tests, procedures, treatments, therapies, discharge instructions or life-style choices that may apply to you. You must talk with your health care provider for complete information about your health and treatment options. This information should not be used to decide whether or not to accept your health care provider's advice, instructions or recommendations. Only your health care provider has the knowledge and training to provide advice that is right for you. The use of this information is governed by the [employer] End User License Agreement, available at https://www.[employer]/en/solutions/[employer]/about/tonny.The use of [employer] content is governed by the [employer] Terms of Use. ©2021 UpToDate, Inc. All rights reserved.  Copyright   © 2021 UpToDate, Inc. and/or its affiliates. All rights reserved.     Soft Diet   About this topic   Some people have problems chewing or swallowing. This might happen after a procedure or illness. A soft diet can give you good nutrition until you can chew and swallow normally.       What will the results be?   You can still get a balanced diet with these foods. You will have less trouble chewing or swallowing.  What changes to diet are needed?   You may need to replace some harder foods with softer foods. These will be easier to chew or swallow, but will have the same nutrients.  Who should use this diet?   Your doctor might suggest a soft diet if you:  Are having radiation therapy on the head, neck, or belly  Had stomach or gut surgery  Are too sick or weak to eat a normal meal  Have poor teeth  Have swallowing problems  Have trouble chewing regular food  What foods are good to eat?   You will be eating foods that are easy to chew and swallow on this diet. These foods are naturally soft. If not, you can cook, chop, or mash them to make them soft. These include:  Cream soups  Moist, tender, well-cooked meats  Fish  Soft chicken  without skin  [place]  Ground meats  Milk products  Yogurt (without nuts, seeds, or raw fruit)  Cottage cheese  Cooked or canned fruit  Soft, peeled fresh fruits without large seeds  Fruit or vegetable juice  Cooked or canned vegetables  Mashed, baked, steamed, or boiled vegetables  Cooked cereals  Rice, if tolerated  Soft breads and crackers  Soft, cooked pasta  Butter  Smooth nut and seed butters  Mayonnaise  Sour cream  Vegetable oil  Smooth ice cream  Sherbet  Custards  Puddings  Cakes  What foods should be limited or avoided?   You should stay away from foods that are hard to chew or swallow, such as:  Tough, dry meat  Yogurt with nuts or coconut  Raw fruits and vegetables  Cooked corn or peas  Dried fruits  Fruit snacks, such as fruit leather  Uncooked, stringy fruits and vegetables, like celery  Fried vegetables  Chewy, dry breads  Crispy crackers or chips  Coarse, dry cereals  Nuts and seeds  [place] nut and seed butters  Fried foods  Sausage  [place]  Cold cuts  Strong, hard cheeses  Peanut brittle  Candy with dried fruit or chewy and sticky candy, like caramel  Will there be any other care needed?   Use a  or  to mash foods if needed.  Steam foods to keep the nutrients. If boiling potatoes, peel after cooking.  Bite-sized pieces are easier to swallow. Pieces should be less than one inch in size.  Foods should be moist. Add gravy, sauce, vegetable juice, fruit juice, broth, milk, or water to moisten foods.  Reheat foods carefully to avoid a tough crust forming on your food.  When do I need to call the doctor?   Call your doctor or dietitian to talk about a good meal plan for you or if you are having problems eating a soft diet. Talk to your doctor about what type of soft diet you need.  Last Reviewed Date   2021-06-23  Consumer Information Use and Disclaimer   This information is not specific medical advice and does not replace information you receive from your health care provider. This is  only a brief summary of general information. It does NOT include all information about conditions, illnesses, injuries, tests, procedures, treatments, therapies, discharge instructions or life-style choices that may apply to you. You must talk with your health care provider for complete information about your health and treatment options. This information should not be used to decide whether or not to accept your health care providers advice, instructions or recommendations. Only your health care provider has the knowledge and training to provide advice that is right for you.  Copyright   Copyright © 2021 UpToDate, Inc. and its affiliates and/or licensors. All rights reserved. Uncertain Causes of Diarrhea (Adult)    Diarrhea is when stools are loose and watery. This can be caused by:  Viral infections  Bacterial infections  Food poisoning  Parasites  Irritable bowel syndrome (IBS)  Inflammatory bowel diseases such as ulcerative colitis, Crohn's disease, and celiac disease  Food intolerance, such as to lactose, the sugar found in milk and milk products  Reaction to medicines like antibiotics, laxatives, cancer drugs, and antacids  Along with diarrhea, you may also have:  Abdominal pain and cramping  Nausea and vomiting  Loss of bowel control  Fever and chills  Bloody stools  In some cases, antibiotics may help to treat diarrhea. You may have a stool sample test. This is done to see what is causing your diarrhea, and if antibiotics will help treat it. The results of a stool sample test may take up to 2 days. The healthcare provider may not give you antibiotics until he or she has the stool test results.  Diarrhea can cause dehydration. This is the loss of too much water and other fluids from the body. When this occurs, body fluid must be replaced. This can be done with oral rehydration solutions. Oral rehydration solutions are available at drugstores and grocery stores without a prescription.  Home care  Follow all  instructions given by your healthcare provider. Rest at home for the next 24 hours, or until you feel better. Avoid caffeine, tobacco, and alcohol. These can make diarrhea, cramping, and pain worse.  If taking medicines:  Dont take over-the-counter diarrhea or nausea medicines unless your healthcare provider tells you to.  You may use acetaminophen or NSAID medicines like ibuprofen or naproxen to reduce pain and fever. Dont use these if you have chronic liver or kidney disease, or ever had a stomach ulcer or gastrointestinal bleeding. Don't use NSAID medicines if you are already taking one for another condition (like arthritis) or are on daily aspirin therapy (such as for heart disease or after a stroke). Talk with your healthcare provider first.  If antibiotics were prescribed, be sure you take them until they are finished. Dont stop taking them even when you feel better. Antibiotics must be taken as a full course.  To prevent the spread of illness:  Remember that washing with soap and water and using alcohol-based  is the best way to prevent the spread of infection.  Clean the toilet after each use.  Wash your hands before eating.  Wash your hands before and after preparing food. Keep in mind that people with diarrhea or vomiting should not prepare food for others.  Wash your hands after using cutting boards, countertops, and knives that have been in contact with raw foods.  Wash and then peel fruits and vegetables.  Keep uncooked meats away from cooked and ready-to-eat foods.  Use a food thermometer when cooking. Cook poultry to at least 165°F (74°C). Cook ground meat (beef, veal, pork, lamb) to at least 160°F (71°C). Cook fresh beef, veal, lamb, and pork to at least 145°F (63°C).  Dont eat raw or undercooked eggs (poached or arcelia side up), poultry, meat, or unpasteurized milk and juices.  Food and drinks  The main goal while treating vomiting or diarrhea is to prevent dehydration. This is done by  taking small amounts of liquids often.  Keep in mind that liquids are more important than food right now.  Drink only small amounts of liquids at a time.  Dont force yourself to eat, especially if you are having cramping, vomiting, or diarrhea. Dont eat large amounts at a time, even if you are hungry.  If you eat, avoid fatty, greasy, spicy, or fried foods.  Dont eat dairy foods or drink milk if you have diarrhea. These can make diarrhea worse.  During the first 24 hours you can try:  Oral rehydration solutions. Do not use sports drinks. They have too much sugar and not enough electrolytes.  Soft drinks without caffeine  Ginger ale  Water (plain or flavored)  Decaf tea or coffee  Clear broth, consommé, or bouillon  Gelatin, popsicles, or frozen fruit juice bars  The second 24 hours, if you are feeling better, you can add:  Hot cereal, plain toast, bread, rolls, or crackers  Plain noodles, rice, mashed potatoes, chicken noodle soup, or rice soup  Unsweetened canned fruit (no pineapple)  Bananas  As you recover:  Limit fat intake to less than 15 grams per day. Dont eat margarine, butter, oils, mayonnaise, sauces, gravies, fried foods, peanut butter, meat, poultry, or fish.  Limit fiber. Dont eat raw or cooked vegetables, fresh fruits except bananas, or bran cereals.  Limit caffeine and chocolate.  Limit dairy.  Dont use spices or seasonings except salt.  Go back to your normal diet over time, as you feel better and your symptoms improve.  If the symptoms come back, go back to a simple diet or clear liquids.  Follow-up care  Follow up with your healthcare provider, or as advised. If a stool sample was taken or cultures were done, call the healthcare provider for the results as instructed.  Call 911  Call 911 if you have any of these symptoms:  Trouble breathing  Confusion  Extreme drowsiness or trouble walking  Loss of consciousness  Rapid heart rate  Chest pain  Stiff neck  Seizure  When to seek medical  advice  Call your healthcare provider right away if any of these occur:  Abdominal pain that gets worse  Constant lower right abdominal pain  Continued vomiting and inability to keep liquids down  Diarrhea more than 5 times a day  Blood in vomit or stool  Dark urine or no urine for 8 hours, dry mouth and tongue, tiredness, weakness, or dizziness  Drowsiness  New rash  You dont get better in 2 to 3 days  Fever of 100.4°F (38°C) or higher that doesnt get lower with medicine  Date Last Reviewed: 1/3/2016  © 4478-9866 [employer]. [address] [place] [id number]. All rights reserved. This information is not intended as a substitute for professional medical care. Always follow your healthcare professional's instructions.

## 2024-05-03 NOTE — PROGRESS NOTES
Subjective:       Patient ID: Tanya Espinoza is a 64 y.o. female Body mass index is 31.27 kg/m².    Chief Complaint: Diarrhea    This patient is new to me.  Referring Provider: Dr. Wendy Rodriguez for GERD.     PCP ordered h pylori stool study yesterday.    GI Problem  The primary symptoms include fatigue (relates to CHF), abdominal pain and diarrhea. Primary symptoms do not include fever, weight loss, nausea, vomiting, melena, hematemesis, hematochezia or dysuria.   The abdominal pain began more than 2 days ago (started ~3 weeks ago with diarrhea). The abdominal pain is located in the periumbilical region (daily typically in the afternoons, last 2 hours; described as sharp). The severity of the abdominal pain is 0/10 (currently).   The diarrhea began more than 1 week ago (started ~3 weeks ago). Daily occurrences: 2 times a day. Risk factors: recent antibiotic/hospitalization for pneumonia & CHF.   The illness is also significant for dysphagia (feels like food sits in esophagus for a long time; triggers reflux). The illness does not include chills, odynophagia or constipation (history of chronic constipation, was going maybe once a week, this has resolved 3 weeks ago). Significant associated medical issues include GERD (frequently depsite taking protonix 40 mg once daily started ~3 weeks ago and no relief; started pepcid 40 mg nightly yesterday from PCP).     Review of Systems   Constitutional:  Positive for appetite change (decreased) and fatigue (relates to CHF). Negative for chills, fever and weight loss.        Denies NSAID use   HENT:  Positive for trouble swallowing. Negative for sore throat.    Respiratory:  Positive for shortness of breath (frequent with exertion; seeing cardiology). Negative for cough and choking.    Cardiovascular:  Negative for chest pain.   Gastrointestinal:  Positive for abdominal pain, diarrhea and dysphagia (feels like food sits in esophagus for a long time; triggers reflux).  Negative for anal bleeding, blood in stool, constipation (history of chronic constipation, was going maybe once a week, this has resolved 3 weeks ago), hematemesis, hematochezia, melena, nausea, rectal pain and vomiting.   Genitourinary:  Negative for difficulty urinating, dysuria and flank pain.   Neurological:  Negative for weakness.       Patient's last menstrual period was 10/28/2016 (approximate).  Past Medical History:   Diagnosis Date    ADHD (attention deficit hyperactivity disorder)     Anemia     Anticoagulant long-term use     Anxiety     CHF (congestive heart failure)     Coronary artery disease     CAD    Elevated LFTs     elevated enzymes    GERD (gastroesophageal reflux disease) 04/24/2021    Headache     Hepatomegaly     Hyperlipemia 04/24/2021    Hypertension     LBBB (left bundle branch block)     Lower extremity edema     Major depressive disorder, recurrent episode, mild 01/03/2022    Obesity     Paroxysmal A-fib     Skin cancer     skin ca     Past Surgical History:   Procedure Laterality Date    ABLATION  04/25/2021    to heart for a-fib x 2    CORONARY ANGIOGRAPHY N/A 05/06/2021    Procedure: ANGIOGRAM, CORONARY ARTERY;  Surgeon: Jesus Sol MD;  Location: [facility] CATH;  Service: Cardiology;  Laterality: N/A;    LEFT HEART CATHETERIZATION N/A 05/06/2021    Procedure: Left heart cath;  Surgeon: Jesus Sol MD;  Location: [facility] CATH;  Service: Cardiology;  Laterality: N/A;    LEFT HEART CATHETERIZATION  03/21/2024    Procedure: Left heart cath;  Surgeon: Jorge iHnkle MD;  Location: [facility] CATH;  Service: Cardiology;;    TONSILLECTOMY      TUBAL LIGATION       Family History   Problem Relation Name Age of Onset    Diabetes Mother      Diverticulitis Mother      Diverticulitis Sister      Pancreatic cancer Brother      Osteoporosis Maternal Grandmother      Colon cancer Neg Hx      Stomach cancer Neg Hx      Ulcerative colitis Neg Hx      Esophageal cancer Neg Hx      Crohn's disease  Neg Hx      Celiac disease Neg Hx       Social History     Tobacco Use    Smoking status: Former     Current packs/day: 0.00     Average packs/day: 1 pack/day for 40.0 years (40.0 ttl pk-yrs)     Types: Cigarettes     Quit date: 2023     Years since quittin.9    Smokeless tobacco: Never    Tobacco comments:     down to 0.25 pack of  cigarettes per day   Substance Use Topics    Alcohol use: Not Currently    Drug use: No     Wt Readings from Last 10 Encounters:   24 93.3 kg (205 lb 11 oz)   24 92.7 kg (204 lb 5.9 oz)   24 92.4 kg (203 lb 11.3 oz)   24 93.6 kg (206 lb 5.6 oz)   24 93.2 kg (205 lb 9.3 oz)   24 89.4 kg (197 lb 1.5 oz)   06/15/22 95.1 kg (209 lb 8.8 oz)   22 95 kg (209 lb 7 oz)   22 95.1 kg (209 lb 10.5 oz)   22 96.2 kg (212 lb 1.3 oz)     Lab Results   Component Value Date    WBC 5.88 2024    HGB 12.9 2024    HCT 39.3 2024    MCV 87 2024     2024     CMP  Sodium   Date Value Ref Range Status   2024 141 136 - 145 mmol/L Final     Potassium   Date Value Ref Range Status   2024 4.1 3.5 - 5.1 mmol/L Final     Comment:     Anion Gap reference range revised on 2023     Chloride   Date Value Ref Range Status   2024 101 95 - 110 mmol/L Final     CO2   Date Value Ref Range Status   2024 35 (H) 22 - 31 mmol/L Final     Glucose   Date Value Ref Range Status   2024 105 70 - 110 mg/dL Final     Comment:     The ADA recommends the following guidelines for fasting glucose:    Normal:       less than 100 mg/dL    Prediabetes:  100 mg/dL to 125 mg/dL    Diabetes:     126 mg/dL or higher       BUN   Date Value Ref Range Status   2024 15 7 - 18 mg/dL Final     Creatinine   Date Value Ref Range Status   2024 1.00 0.50 - 1.40 mg/dL Final     Calcium   Date Value Ref Range Status   2024 9.6 8.4 - 10.2 mg/dL Final     Total Protein   Date Value Ref Range Status   2024 7.7  6.0 - 8.4 g/dL Final     Albumin   Date Value Ref Range Status   04/17/2024 4.0 3.5 - 5.2 g/dL Final     Total Bilirubin   Date Value Ref Range Status   04/17/2024 0.8 0.2 - 1.3 mg/dL Final     Alkaline Phosphatase   Date Value Ref Range Status   04/17/2024 106 38 - 145 U/L Final     AST   Date Value Ref Range Status   04/17/2024 31 14 - 36 U/L Final     ALT   Date Value Ref Range Status   04/17/2024 23 0 - 35 U/L Final     Anion Gap   Date Value Ref Range Status   04/17/2024 5 5 - 12 mmol/L Final     Comment:     Anion Gap reference range revised on 4/28/2023     eGFR if    Date Value Ref Range Status   02/10/2022 >60.0 >60 mL/min/1.73 m^2 Final     eGFR if non    Date Value Ref Range Status   02/10/2022 >60.0 >60 mL/min/1.73 m^2 Final     Comment:     Calculation used to obtain the estimated glomerular filtration  rate (eGFR) is the CKD-EPI equation.        Lab Results   Component Value Date    TSH 3.530 03/18/2024 1/11/2022 fecal immunochemical test negative    Reviewed prior medical records including radiology report of 5/10/2021 abdominal ultrasound.    Objective:      Physical Exam  Vitals and nursing note reviewed.   Constitutional:       General: She is not in acute distress.     Appearance: Normal appearance. She is well-developed. She is not diaphoretic.   HENT:      Mouth/Throat:      Lips: Pink. No lesions.      Mouth: Mucous membranes are moist. No oral lesions.      Tongue: No lesions.      Pharynx: Oropharynx is clear. No pharyngeal swelling or posterior oropharyngeal erythema.   Eyes:      General: No scleral icterus.     Conjunctiva/sclera: Conjunctivae normal.   Pulmonary:      Effort: Pulmonary effort is normal. No respiratory distress.      Breath sounds: Normal breath sounds. No wheezing.   Abdominal:      General: Bowel sounds are normal. There is no distension or abdominal bruit.      Palpations: Abdomen is soft. Abdomen is not rigid. There is no mass.       Tenderness: There is abdominal tenderness (mild to epigastric and moderate to RUQ). There is guarding. There is no rebound. Negative signs include McBurney's sign.   Skin:     General: Skin is warm and dry.      Coloration: Skin is not jaundiced or pale.      Findings: No erythema or rash.   Neurological:      Mental Status: She is alert and oriented to person, place, and time.   Psychiatric:         Behavior: Behavior normal.         Thought Content: Thought content normal.         Judgment: Judgment normal.         Assessment:       1. Heartburn    2. Esophageal dysphagia    3. Upper abdominal pain    4. Periumbilical abdominal pain    5. Diarrhea, unspecified type    6. Change in bowel habits    7. History of congestive heart failure    8. History of shortness of breath    9. Anticoagulant long-term use        Plan:       Heartburn  - schedule EGD, discussed procedure with patient, including risks and benefits, patient verbalized understanding  - CONTINUE PROTONIX 40 MG ONCE DAILY AS DIRECTED  - TAKE PEPCID 40 MG NIGHTLY AS PRESCRIBED BY PCP YESTERDAY    Esophageal dysphagia  - schedule EGD, discussed procedure with patient and possible esophageal dilation may be performed during procedure if indicated, patient verbalized understanding  - educated patient to eat smaller more frequent meals and to eat slowly and advised to eat a soft diet.  - possible UGI/esophagram/esophageal manometry if symptoms persist    Upper abdominal pain & Periumbilical abdominal pain  -     US Abdomen Complete; Future; Expected date: 05/03/2024  - schedule EGD, discussed procedure with patient, including risks and benefits, patient verbalized understanding  - avoid/minimize use of NSAIDs- since they can cause GI upset, bleeding and/or ulcers. If NSAID must be taken, recommend take with food.  - Possible CT scan pending results of testing and if symptoms persist    Diarrhea, unspecified type & Change in bowel habits  -     Stool  Exam-Ova,Cysts,Parasites; Future; Expected date: 05/03/2024  -     Giardia / Cryptosporidum, EIA; Future; Expected date: 05/03/2024  -     Occult blood x 1, stool; Future; Expected date: 05/03/2024  -     WBC, Stool; Future; Expected date: 05/03/2024  -     Stool culture; Future; Expected date: 05/03/2024  -     Clostridium difficile EIA; Future; Expected date: 05/03/2024  - schedule Colonoscopy, discussed procedure with the patient, including risks and benefits, patient verbalized understanding  - recommend OTC probiotic, such as Florastor or Culturelle, taken as directed on packaging  - avoid lactose, alcohol, & caffeine  - avoid known triggers  - Possible CT scan pending results of testing and if symptoms persist    History of congestive heart failure & History of shortness of breath  - follow-up with PCP &/or cardiologist for continued evaluation and management ASAP  - if experiencing symptoms of headache, chest pain, shortness of breath, and/or blurred vision, recommend going to ER for further evaluation and management    Anticoagulant long-term use  - anticoagulant(s) will likely need to be held for endoscopy, nurse will confirm with endoscopist, cardiologist, and/or PCP.    Follow up in about 1 month (around 6/3/2024), or if symptoms worsen or fail to improve.      If no improvement in symptoms or symptoms worsen, call/follow-up at clinic or go to ER.        27 minutes of total time spent on the encounter, which includes face to face time and non-face to face time preparing to see the patient (e.g., review of tests), Obtaining and/or reviewing separately obtained history, Documenting clinical information in the electronic or other health record, Independently interpreting results (not separately reported) and communicating results to the patient/family/caregiver, or Care coordination (not separately reported).

## 2024-05-10 ENCOUNTER — HOSPITAL ENCOUNTER (OUTPATIENT)
Dept: RADIOLOGY | Facility: HOSPITAL | Age: 64
Discharge: HOME | End: 2024-05-10
Attending: NURSE PRACTITIONER
Payer: COMMERCIAL

## 2024-05-10 DIAGNOSIS — R10.33 PERIUMBILICAL ABDOMINAL PAIN: ICD-10-CM

## 2024-05-10 PROCEDURE — 76700 US EXAM ABDOM COMPLETE: CPT | Mod: TC,PO

## 2024-05-10 PROCEDURE — 76700 US EXAM ABDOM COMPLETE: CPT | Mod: 26,,, | Performed by: RADIOLOGY

## 2024-05-20 DIAGNOSIS — B96.81 HELICOBACTER PYLORI GASTRITIS: Primary | ICD-10-CM

## 2024-05-20 DIAGNOSIS — I48.0 PAROXYSMAL ATRIAL FIBRILLATION: Primary | ICD-10-CM

## 2024-05-20 DIAGNOSIS — I51.9 LV DYSFUNCTION: ICD-10-CM

## 2024-05-20 DIAGNOSIS — E78.5 HYPERLIPIDEMIA, UNSPECIFIED HYPERLIPIDEMIA TYPE: ICD-10-CM

## 2024-05-20 DIAGNOSIS — K21.00 GASTROESOPHAGEAL REFLUX DISEASE WITH ESOPHAGITIS WITHOUT HEMORRHAGE: ICD-10-CM

## 2024-05-20 DIAGNOSIS — K29.70 HELICOBACTER PYLORI GASTRITIS: Primary | ICD-10-CM

## 2024-05-20 DIAGNOSIS — I25.10 CORONARY ARTERY DISEASE INVOLVING NATIVE CORONARY ARTERY OF NATIVE HEART WITHOUT ANGINA PECTORIS: ICD-10-CM

## 2024-05-20 RX ORDER — METOPROLOL SUCCINATE 25 MG/1
12.5 TABLET, EXTENDED RELEASE ORAL DAILY
Qty: 15 TABLET | Refills: 3 | Status: SHIPPED | OUTPATIENT
Start: 2024-05-20 | End: 2025-05-20

## 2024-05-20 RX ORDER — PANTOPRAZOLE SODIUM 40 MG/1
40 TABLET, DELAYED RELEASE ORAL 2 TIMES DAILY
Qty: 60 TABLET | Refills: 3 | Status: SHIPPED | OUTPATIENT
Start: 2024-05-20 | End: 2025-05-20

## 2024-05-20 RX ORDER — DIGOXIN 125 MCG
125 TABLET ORAL DAILY
Qty: 30 TABLET | Refills: 11 | Status: SHIPPED | OUTPATIENT
Start: 2024-05-20 | End: 2025-05-20

## 2024-05-20 RX ORDER — METRONIDAZOLE 500 MG/1
500 TABLET ORAL EVERY 12 HOURS
Qty: 28 TABLET | Refills: 0 | Status: SHIPPED | OUTPATIENT
Start: 2024-05-20 | End: 2024-06-03

## 2024-05-20 RX ORDER — NITROGLYCERIN 0.4 MG/1
0.4 TABLET SUBLINGUAL EVERY 5 MIN PRN
Qty: 30 TABLET | Refills: 4 | Status: SHIPPED | OUTPATIENT
Start: 2024-05-20 | End: 2025-05-20

## 2024-05-20 RX ORDER — ATORVASTATIN CALCIUM 20 MG/1
20 TABLET, FILM COATED ORAL NIGHTLY
Qty: 30 TABLET | Refills: 3 | Status: SHIPPED | OUTPATIENT
Start: 2024-05-20 | End: 2025-05-20

## 2024-05-20 RX ORDER — FUROSEMIDE 40 MG/1
40 TABLET ORAL DAILY
Qty: 30 TABLET | Refills: 1 | Status: SHIPPED | OUTPATIENT
Start: 2024-05-20 | End: 2025-05-20

## 2024-05-20 RX ORDER — AMOXICILLIN 500 MG/1
1000 TABLET, FILM COATED ORAL EVERY 12 HOURS
Qty: 56 TABLET | Refills: 0 | Status: SHIPPED | OUTPATIENT
Start: 2024-05-20 | End: 2024-06-03

## 2024-05-20 RX ORDER — CLARITHROMYCIN 500 MG/1
500 TABLET, FILM COATED ORAL EVERY 12 HOURS
Qty: 28 TABLET | Refills: 0 | Status: SHIPPED | OUTPATIENT
Start: 2024-05-20 | End: 2024-06-03

## 2024-05-20 NOTE — TELEPHONE ENCOUNTER
Pt informed of H pylori dx and medications would be called in to the pharmacy. Also informed to increase her pantoprazole to 40 mg bid x 14 days - verbalized understanding.

## 2024-05-20 NOTE — TELEPHONE ENCOUNTER
Please let her know that her stool was positive for h pylori infection.     She needs treatment :  Increase her pantoprazole to 40 mg bid x 14 days  Start clarithromycin 500 mg bid for 14 days  Start amoxicillin 1000 mg bid x 14 days  Start metronidazole 500 mg bid for 14 days     I will send rx to pharm.     Thanks

## 2024-05-20 NOTE — TELEPHONE ENCOUNTER
No care due was identified.  [facility] Embedded Care Due Messages. Reference number: [id number].   5/20/2024 10:59:31 AM CDT

## 2024-05-20 NOTE — TELEPHONE ENCOUNTER
----- Message from Sarai Victor sent at 5/20/2024 10:37 AM CDT -----  Contact: /Mejia  Type:  RX Refill Request    Who Called:  /Mejia  Refill or New Rx:  refill  RX Name and Strength:    pantoprazole (PROTONIX) 40 MG tablet    nitroGLYCERIN (NITROSTAT) 0.4 MG SL tablet    How is the patient currently taking it? (ex. 1XDay):  as directed  Is this a 30 day or 90 day RX:  30    Preferred Pharmacy with phone number:    [address] [address]  Phone: [phone] Fax: [phone]    Local or Mail Order:  local  Ordering Provider:  Michael Watkins Call Back Number:  [phone]  Additional Information:   states only 2 days of meds left, please call

## 2024-05-21 ENCOUNTER — TELEPHONE (OUTPATIENT)
Dept: GASTROENTEROLOGY | Facility: CLINIC | Age: 64
End: 2024-05-21
Payer: COMMERCIAL

## 2024-05-21 DIAGNOSIS — R19.5 YEAST IN STOOL: Primary | ICD-10-CM

## 2024-05-21 RX ORDER — NYSTATIN 500000 [USP'U]/1
500000 TABLET, COATED ORAL EVERY 8 HOURS
Qty: 21 TABLET | Refills: 0 | Status: SHIPPED | OUTPATIENT
Start: 2024-05-21 | End: 2024-05-28

## 2024-05-21 NOTE — TELEPHONE ENCOUNTER
Noted that patient was positive for h pylori testing which was previously ordered by Dr. Rodriguez. I see that Dr. Rodriguez sent in treatment for h pylori yesterday of (clarithromycin, amoxicillin, and metronidazole).  I called patient to discuss her concerns. Patient reports she has been having frequent belching which has been keeping her up at night. I reviewed results with patient, including stool studies that I ordered and the h pylori test that Dr. Rodriguez ordered and that results go to the ordering provider. I informed patient that we typically treat h pylori with two antibiotics and an acid-reducing medication. Offered to change/adjust treatment. Patient declined. Discussed with patient that we typically repeat h pylori testing about 2 months after treatment is completed to check for eradication. Patient reports she would like sooner EGD. Informed patient that I will forward message to our nursing staff to see if there is any sooner availability for EGD. I offered to answer her questions and concerns but patient reports she only wants to speak to a GI physician. Patient wants an appointment with GI physician and does not want to see a nurse practitioner. Message has been sent to nursing staff.  MERY

## 2024-05-21 NOTE — TELEPHONE ENCOUNTER
Called and spoke with the patient, fang notified of our results, patient verbalized understanding of them, patient then revealed that she had a H yplori stool turned for Wendy Rodriguez that was positive for H pylori, medications called in for her by that office however she reports that she feels very bad and is disappointed as she was seen be an LPN in our office and does not think she was asked the correct questions, patient was advised that she was seen by a GI trained NP at the time of her visit, patient was advised that her message would be sent to Mary Beth Simmons NP per her request.

## 2024-05-21 NOTE — TELEPHONE ENCOUNTER
Please call to inform & review the results with the patient- stool studies received back showed occasional yeast; otherwise, negative/normal results. Recommend OTC probiotic taken as directed on packaging and sent in a prescription for nystatin.    Continue with previous recommendations. If no improvement in symptoms or symptoms worsen, call/follow-up at clinic or go to ER.    Thanks,

## 2024-05-22 NOTE — TELEPHONE ENCOUNTER
Called and spoke with the patient, patient notified that no sooner availability found as of now, patient verbalized understanding of this.

## 2024-05-23 ENCOUNTER — PATIENT MESSAGE (OUTPATIENT)
Dept: CARDIOLOGY | Facility: CLINIC | Age: 64
End: 2024-05-23
Payer: COMMERCIAL

## 2024-05-24 ENCOUNTER — PATIENT MESSAGE (OUTPATIENT)
Dept: FAMILY MEDICINE | Facility: CLINIC | Age: 64
End: 2024-05-24
Payer: COMMERCIAL

## 2024-05-24 DIAGNOSIS — K29.70 HELICOBACTER PYLORI GASTRITIS: Primary | ICD-10-CM

## 2024-05-24 DIAGNOSIS — B96.81 HELICOBACTER PYLORI GASTRITIS: Primary | ICD-10-CM

## 2024-05-27 NOTE — TELEPHONE ENCOUNTER
Spoke with pt - She has seen the Gastro NP. Scheduled for scope but can't get done until August with an [facility] MD. Plans to call her insurance today and see if she can see someone outside of [facility] quicker and will let us know if she needs a referral.

## 2024-06-17 PROBLEM — A41.9 SEVERE SEPSIS: Status: RESOLVED | Noted: 2024-03-17 | Resolved: 2024-06-17

## 2024-06-17 PROBLEM — J96.01 ACUTE HYPOXEMIC RESPIRATORY FAILURE: Status: RESOLVED | Noted: 2024-03-17 | Resolved: 2024-06-17

## 2024-06-17 PROBLEM — R65.20 SEVERE SEPSIS: Status: RESOLVED | Noted: 2024-03-17 | Resolved: 2024-06-17

## 2024-06-17 PROBLEM — J18.9 PNEUMONIA: Status: RESOLVED | Noted: 2024-03-17 | Resolved: 2024-06-17

## 2024-06-24 PROBLEM — N17.9 AKI (ACUTE KIDNEY INJURY): Status: RESOLVED | Noted: 2024-03-20 | Resolved: 2024-06-24

## 2024-07-08 ENCOUNTER — OFFICE VISIT (OUTPATIENT)
Dept: FAMILY MEDICINE | Facility: CLINIC | Age: 64
End: 2024-07-08
Payer: COMMERCIAL

## 2024-07-08 VITALS
DIASTOLIC BLOOD PRESSURE: 72 MMHG | HEIGHT: 68 IN | HEART RATE: 91 BPM | WEIGHT: 211 LBS | TEMPERATURE: 98 F | OXYGEN SATURATION: 96 % | BODY MASS INDEX: 31.98 KG/M2 | SYSTOLIC BLOOD PRESSURE: 116 MMHG

## 2024-07-08 DIAGNOSIS — R35.0 URINARY FREQUENCY: ICD-10-CM

## 2024-07-08 DIAGNOSIS — N39.0 URINARY TRACT INFECTION WITHOUT HEMATURIA, SITE UNSPECIFIED: ICD-10-CM

## 2024-07-08 DIAGNOSIS — B37.31 VAGINAL CANDIDA: Primary | ICD-10-CM

## 2024-07-08 PROCEDURE — 1159F MED LIST DOCD IN RCRD: CPT | Mod: CPTII,S$GLB,, | Performed by: NURSE PRACTITIONER

## 2024-07-08 PROCEDURE — 1160F RVW MEDS BY RX/DR IN RCRD: CPT | Mod: CPTII,S$GLB,, | Performed by: NURSE PRACTITIONER

## 2024-07-08 PROCEDURE — 3074F SYST BP LT 130 MM HG: CPT | Mod: CPTII,S$GLB,, | Performed by: NURSE PRACTITIONER

## 2024-07-08 PROCEDURE — 3078F DIAST BP <80 MM HG: CPT | Mod: CPTII,S$GLB,, | Performed by: NURSE PRACTITIONER

## 2024-07-08 PROCEDURE — 87186 SC STD MICRODIL/AGAR DIL: CPT | Performed by: NURSE PRACTITIONER

## 2024-07-08 PROCEDURE — 99213 OFFICE O/P EST LOW 20 MIN: CPT | Mod: S$GLB,,, | Performed by: NURSE PRACTITIONER

## 2024-07-08 PROCEDURE — 87086 URINE CULTURE/COLONY COUNT: CPT | Performed by: NURSE PRACTITIONER

## 2024-07-08 PROCEDURE — 3044F HG A1C LEVEL LT 7.0%: CPT | Mod: CPTII,S$GLB,, | Performed by: NURSE PRACTITIONER

## 2024-07-08 PROCEDURE — 87088 URINE BACTERIA CULTURE: CPT | Performed by: NURSE PRACTITIONER

## 2024-07-08 PROCEDURE — 4010F ACE/ARB THERAPY RXD/TAKEN: CPT | Mod: CPTII,S$GLB,, | Performed by: NURSE PRACTITIONER

## 2024-07-08 PROCEDURE — 3008F BODY MASS INDEX DOCD: CPT | Mod: CPTII,S$GLB,, | Performed by: NURSE PRACTITIONER

## 2024-07-08 RX ORDER — FLUCONAZOLE 150 MG/1
150 TABLET ORAL DAILY
Qty: 7 TABLET | Refills: 0 | Status: SHIPPED | OUTPATIENT
Start: 2024-07-08 | End: 2024-07-15

## 2024-07-08 NOTE — PROGRESS NOTES
Subjective:       Patient ID: Tanya Espinoza is a 64 y.o. female.    Chief Complaint: Urinary Frequency and Abdominal Pain (LOWER )    Urinary Frequency   Associated symptoms include frequency. Pertinent negatives include no flank pain, nausea or vomiting.   Abdominal Pain  Associated symptoms include arthralgias and frequency. Pertinent negatives include no diarrhea, fever, nausea or vomiting.     New patient to me, here with concerns for yeast infection. States having urinary frequency, urgency. Having some lower bladder pressure. She took Azo so unable to do POCT in clinic today. Will send for culture. States she feels this is probably more yeast. States vaginal discharge and vaginal itching. Has been on a lot of antibiotics for C-Diff. She has been hydrating well. Denies fever. No flank pain. See ROS    The following portion of the patients history was reviewed and updated as appropriate: allergies, current medications, past medical and surgical history. Past social history and problem list reviewed. Family PMH and Past social history reviewed. Tobacco, Illicit drug use reviewed.      Review of patient's allergies indicates:   Allergen Reactions    Adhesive Rash    Effexor [venlafaxine] Other (See Comments)     Bruising all over body and elevated blood pressure         Current Outpatient Medications:     aspirin (ECOTRIN) 81 MG EC tablet, Take 1 tablet (81 mg total) by mouth once daily., Disp: 90 tablet, Rfl: 1    atorvastatin (LIPITOR) 20 MG tablet, Take 1 tablet (20 mg total) by mouth every evening., Disp: 30 tablet, Rfl: 3    digoxin (LANOXIN) 125 mcg tablet, Take 1 tablet (125 mcg total) by mouth once daily., Disp: 30 tablet, Rfl: 11    famotidine (PEPCID) 40 MG tablet, Take 1 tablet (40 mg total) by mouth every evening., Disp: 90 tablet, Rfl: 3    furosemide (LASIX) 40 MG tablet, Take 1 tablet (40 mg total) by mouth once daily., Disp: 30 tablet, Rfl: 1    metoprolol succinate (TOPROL-XL) 25 MG 24 hr  tablet, Take 0.5 tablets (12.5 mg total) by mouth once daily., Disp: 15 tablet, Rfl: 3    multivitamin with minerals tablet, Take 1 tablet by mouth once daily., Disp: , Rfl:     pantoprazole (PROTONIX) 40 MG tablet, Take 1 tablet (40 mg total) by mouth 2 (two) times daily., Disp: 60 tablet, Rfl: 3    sacubitriL-valsartan (ENTRESTO) 24-26 mg per tablet, Take 1 tablet by mouth 2 (two) times daily., Disp: 60 tablet, Rfl: 1    naproxen sodium (ANAPROX) 220 MG tablet, Take 220 mg by mouth every 12 (twelve) hours. (Patient not taking: Reported on 7/8/2024), Disp: , Rfl:     nitroGLYCERIN (NITROSTAT) 0.4 MG SL tablet, Place 1 tablet (0.4 mg total) under the tongue every 5 (five) minutes as needed for Chest pain. (Patient not taking: Reported on 7/8/2024), Disp: 30 tablet, Rfl: 4    traMADoL (ULTRAM) 50 mg tablet, Take 50 mg by mouth every 6 (six) hours as needed. (Patient not taking: Reported on 7/8/2024), Disp: , Rfl:     Past Medical History:   Diagnosis Date    ADHD (attention deficit hyperactivity disorder)     Anemia     Anticoagulant long-term use     Anxiety     CHF (congestive heart failure)     Coronary artery disease     CAD    Elevated LFTs     elevated enzymes    GERD (gastroesophageal reflux disease) 04/24/2021    Headache     Hepatomegaly     Hyperlipemia 04/24/2021    Hypertension     LBBB (left bundle branch block)     Lower extremity edema     Major depressive disorder, recurrent episode, mild 01/03/2022    Obesity     Paroxysmal A-fib     Skin cancer     skin ca       Past Surgical History:   Procedure Laterality Date    ABLATION  04/25/2021    to heart for a-fib x 2    CORONARY ANGIOGRAPHY N/A 05/06/2021    Procedure: ANGIOGRAM, CORONARY ARTERY;  Surgeon: Jesus Sol MD;  Location: [facility] CATH;  Service: Cardiology;  Laterality: N/A;    LEFT HEART CATHETERIZATION N/A 05/06/2021    Procedure: Left heart cath;  Surgeon: Jesus Sol MD;  Location: [facility] CATH;  Service: Cardiology;  Laterality: N/A;     LEFT HEART CATHETERIZATION  2024    Procedure: Left heart cath;  Surgeon: Jorge Hinkle MD;  Location: [facility] CATH;  Service: Cardiology;;    TONSILLECTOMY      TUBAL LIGATION         Social History     Socioeconomic History    Marital status:    Occupational History    Occupation: refurbish [employer]   Tobacco Use    Smoking status: Former     Current packs/day: 0.00     Average packs/day: 1 pack/day for 40.0 years (40.0 ttl pk-yrs)     Types: Cigarettes     Quit date: 2023     Years since quittin.1    Smokeless tobacco: Never    Tobacco comments:     down to 0.25 pack of  cigarettes per day   Substance and Sexual Activity    Alcohol use: Not Currently    Drug use: No     Social Determinants of Health     Food Insecurity: No Food Insecurity (3/18/2024)    Hunger Vital Sign     Worried About Running Out of Food in the Last Year: Never true     Ran Out of Food in the Last Year: Never true   Transportation Needs: No Transportation Needs (3/18/2024)    PRAPARE - Transportation     Lack of Transportation (Medical): No     Lack of Transportation (Non-Medical): No   Housing Stability: Low Risk  (3/18/2024)    Housing Stability Vital Sign     Unable to Pay for Housing in the Last Year: No     Number of Places Lived in the Last Year: 1     Unstable Housing in the Last Year: No     Review of Systems   Constitutional:  Negative for fatigue and fever.   HENT: Negative.     Eyes:  Negative for visual disturbance.   Respiratory:  Negative for cough, chest tightness, shortness of breath and wheezing.    Cardiovascular:  Negative for chest pain, palpitations and leg swelling.   Gastrointestinal:  Positive for abdominal pain. Negative for blood in stool, diarrhea, nausea and vomiting.   Genitourinary:  Positive for frequency and vaginal discharge (vaginal itching). Negative for flank pain.   Musculoskeletal:  Positive for arthralgias. Negative for back pain and gait problem.   Skin:  "Negative.        Objective:      /72 (BP Location: Left arm, Patient Position: Sitting, BP Method: Large (Manual))   Pulse 91   Temp 98.4 °F (36.9 °C)   Ht 5' 8" (1.727 m)   Wt 95.7 kg (210 lb 15.7 oz)   LMP 10/28/2016 (Approximate)   SpO2 96%   BMI 32.08 kg/m²      Physical Exam  Constitutional:       Appearance: Normal appearance. She is obese.   HENT:      Head: Normocephalic.   Eyes:      Pupils: Pupils are equal, round, and reactive to light.   Cardiovascular:      Rate and Rhythm: Normal rate and regular rhythm.      Pulses: Normal pulses.      Heart sounds: Normal heart sounds. No murmur heard.  Pulmonary:      Effort: Pulmonary effort is normal.      Breath sounds: Normal breath sounds. No decreased breath sounds or wheezing.   Abdominal:      General: Bowel sounds are normal.      Tenderness: There is abdominal tenderness in the suprapubic area. There is no right CVA tenderness, left CVA tenderness, guarding or rebound.   Musculoskeletal:         General: Normal range of motion.      Cervical back: Normal range of motion.      Right lower leg: No edema.      Left lower leg: No edema.      Comments: Gait normal.   Skin:     General: Skin is warm and dry.      Capillary Refill: Capillary refill takes less than 2 seconds.   Neurological:      General: No focal deficit present.      Mental Status: She is alert.   Psychiatric:         Attention and Perception: Attention and perception normal.         Mood and Affect: Mood and affect normal.         Speech: Speech normal.         Behavior: Behavior normal.         Assessment:       1. Vaginal candida    2. Urinary frequency    3. Urinary tract infection without hematuria, site unspecified        Plan:       Vaginal candida: diflucan daily for 3 days, may repeat if needed    Urinary frequency: unable to perform due to use of Azo  -     Cancel: POCT Urinalysis(Instrument)    Urinary tract infection without hematuria, site unspecified: will send urine " for culture. Will hold off on antibiotics for now due to recent C-Diff.   Hydrate well. Continue Azo prn  -     Urine culture    Other orders  -     fluconazole (DIFLUCAN) 150 MG Tab; Take 1 tablet (150 mg total) by mouth once daily. for 7 days  Dispense: 7 tablet; Refill: 0       Continue current medication  Take medications only as prescribed  Healthy diet, exercise  Adequate rest  Adequate hydration  Avoid allergens  Avoid excessive caffeine     If not improving follow up

## 2024-07-11 ENCOUNTER — PATIENT MESSAGE (OUTPATIENT)
Dept: FAMILY MEDICINE | Facility: CLINIC | Age: 64
End: 2024-07-11
Payer: COMMERCIAL

## 2024-07-11 LAB — BACTERIA UR CULT: ABNORMAL

## 2024-07-11 RX ORDER — SULFAMETHOXAZOLE AND TRIMETHOPRIM 800; 160 MG/1; MG/1
1 TABLET ORAL 2 TIMES DAILY
Qty: 8 TABLET | Refills: 0 | Status: SHIPPED | OUTPATIENT
Start: 2024-07-11

## 2024-07-11 NOTE — TELEPHONE ENCOUNTER
"Spoke to pt and informed her Bety said "Her urine culture results came back today. She needs to be on antibiotics. She sent Bactrim to the pharmacy for her."  Pt verbalizes understanding.   "

## 2024-07-11 NOTE — TELEPHONE ENCOUNTER
See PP message. Her culture results came back today positive for infection in her urine. I sent antibiotics to the pharmacy for her

## 2024-07-15 ENCOUNTER — HOSPITAL ENCOUNTER (OUTPATIENT)
Dept: RADIOLOGY | Facility: HOSPITAL | Age: 64
Discharge: HOME | End: 2024-07-15
Attending: INTERNAL MEDICINE
Payer: COMMERCIAL

## 2024-07-15 DIAGNOSIS — Z12.31 ENCOUNTER FOR SCREENING MAMMOGRAM FOR MALIGNANT NEOPLASM OF BREAST: ICD-10-CM

## 2024-07-15 PROCEDURE — 77067 SCR MAMMO BI INCL CAD: CPT | Mod: 26,,, | Performed by: RADIOLOGY

## 2024-07-15 PROCEDURE — 77063 BREAST TOMOSYNTHESIS BI: CPT | Mod: 26,,, | Performed by: RADIOLOGY

## 2024-07-15 PROCEDURE — 77063 BREAST TOMOSYNTHESIS BI: CPT | Mod: TC,PO

## 2024-07-18 PROBLEM — R06.00 DYSPNEA: Status: ACTIVE | Noted: 2021-05-03

## 2024-07-27 NOTE — TELEPHONE ENCOUNTER
She needs to see the surgeon to have it drained if it is an abscess.  She can try warm compresses but I am assuming it is fairly severe is she need to see the surgeon.      When is her appt?      thanks   RT sided flank pain

## 2024-07-29 ENCOUNTER — TELEPHONE (OUTPATIENT)
Dept: NEUROLOGY | Facility: CLINIC | Age: 64
End: 2024-07-29
Payer: COMMERCIAL

## 2024-07-29 NOTE — TELEPHONE ENCOUNTER
Spoke with pt to get her an in person appt with Carla on 8/15 at 9:15, pt stated that she wants to be seen for memory loss issues and she mentioned she has been in and out of the hospital for various reasons.

## 2024-07-29 NOTE — TELEPHONE ENCOUNTER
----- Message from Jt Francisco MD sent at 7/29/2024  3:43 PM CDT -----  Contact: [phone]  Yes please  Confirm why they want to be seen  Ie what has changed  ----- Message -----  From: Tommy Carrasco  Sent: 7/29/2024   2:54 PM CDT  To: Jt Francisco MD; Evy Ross MA    Last seen 2/2022  Schedule with Aruna?  ----- Message -----  From: Lizbeth Negron  Sent: 7/29/2024  12:16 PM CDT  To: Maryam VELOZ Staff    PATIENTCALL     Pt is calling to speak with someone in provider office regarding she wants to schedule an appt to see the provider but, no appt in epic. She is asking for a return call please call.  States she has been losing her bearing

## 2024-07-30 ENCOUNTER — TELEPHONE (OUTPATIENT)
Dept: FAMILY MEDICINE | Facility: CLINIC | Age: 64
End: 2024-07-30

## 2024-07-30 ENCOUNTER — OFFICE VISIT (OUTPATIENT)
Dept: FAMILY MEDICINE | Facility: CLINIC | Age: 64
End: 2024-07-30
Payer: COMMERCIAL

## 2024-07-30 VITALS
DIASTOLIC BLOOD PRESSURE: 74 MMHG | SYSTOLIC BLOOD PRESSURE: 116 MMHG | OXYGEN SATURATION: 96 % | HEIGHT: 68 IN | TEMPERATURE: 98 F | BODY MASS INDEX: 31.96 KG/M2 | WEIGHT: 210.88 LBS | HEART RATE: 76 BPM

## 2024-07-30 DIAGNOSIS — N39.0 URINARY TRACT INFECTION WITH HEMATURIA, SITE UNSPECIFIED: ICD-10-CM

## 2024-07-30 DIAGNOSIS — R31.9 URINARY TRACT INFECTION WITH HEMATURIA, SITE UNSPECIFIED: ICD-10-CM

## 2024-07-30 DIAGNOSIS — R73.03 PREDIABETES: ICD-10-CM

## 2024-07-30 DIAGNOSIS — R10.30 LOWER ABDOMINAL PAIN: ICD-10-CM

## 2024-07-30 DIAGNOSIS — R50.9 FEVER, UNSPECIFIED FEVER CAUSE: Primary | ICD-10-CM

## 2024-07-30 PROBLEM — B96.81: Status: ACTIVE | Noted: 2024-06-04

## 2024-07-30 PROBLEM — F15.20 STIMULANT DEPENDENCE: Status: RESOLVED | Noted: 2017-07-05 | Resolved: 2024-07-30

## 2024-07-30 PROBLEM — K25.9: Status: ACTIVE | Noted: 2024-06-04

## 2024-07-30 LAB
ALBUMIN SERPL BCP-MCNC: 3.4 G/DL (ref 3.5–5.2)
ALP SERPL-CCNC: 127 U/L (ref 55–135)
ALT SERPL W/O P-5'-P-CCNC: 25 U/L (ref 10–44)
ANION GAP SERPL CALC-SCNC: 10 MMOL/L (ref 8–16)
AST SERPL-CCNC: 21 U/L (ref 10–40)
BASOPHILS # BLD AUTO: 0.04 K/UL (ref 0–0.2)
BASOPHILS NFR BLD: 0.5 % (ref 0–1.9)
BILIRUB SERPL-MCNC: 0.8 MG/DL (ref 0.1–1)
BILIRUBIN, UA POC OHS: ABNORMAL
BLOOD, UA POC OHS: ABNORMAL
BUN SERPL-MCNC: 14 MG/DL (ref 8–23)
CALCIUM SERPL-MCNC: 10.1 MG/DL (ref 8.7–10.5)
CHLORIDE SERPL-SCNC: 103 MMOL/L (ref 95–110)
CLARITY, UA POC OHS: CLEAR
CO2 SERPL-SCNC: 29 MMOL/L (ref 23–29)
COLOR, UA POC OHS: YELLOW
CREAT SERPL-MCNC: 0.9 MG/DL (ref 0.5–1.4)
DIFFERENTIAL METHOD BLD: ABNORMAL
EOSINOPHIL # BLD AUTO: 0.3 K/UL (ref 0–0.5)
EOSINOPHIL NFR BLD: 4.1 % (ref 0–8)
ERYTHROCYTE [DISTWIDTH] IN BLOOD BY AUTOMATED COUNT: 12.2 % (ref 11.5–14.5)
EST. GFR  (NO RACE VARIABLE): >60 ML/MIN/1.73 M^2
ESTIMATED AVG GLUCOSE: 100 MG/DL (ref 68–131)
GLUCOSE SERPL-MCNC: 120 MG/DL (ref 70–110)
GLUCOSE, UA POC OHS: NEGATIVE
HBA1C MFR BLD: 5.1 % (ref 4–5.6)
HCT VFR BLD AUTO: 40.9 % (ref 37–48.5)
HGB BLD-MCNC: 12.9 G/DL (ref 12–16)
IMM GRANULOCYTES # BLD AUTO: 0.03 K/UL (ref 0–0.04)
IMM GRANULOCYTES NFR BLD AUTO: 0.4 % (ref 0–0.5)
KETONES, UA POC OHS: ABNORMAL
LEUKOCYTES, UA POC OHS: ABNORMAL
LYMPHOCYTES # BLD AUTO: 1.7 K/UL (ref 1–4.8)
LYMPHOCYTES NFR BLD: 19.7 % (ref 18–48)
MCH RBC QN AUTO: 28.8 PG (ref 27–31)
MCHC RBC AUTO-ENTMCNC: 31.5 G/DL (ref 32–36)
MCV RBC AUTO: 91 FL (ref 82–98)
MONOCYTES # BLD AUTO: 0.6 K/UL (ref 0.3–1)
MONOCYTES NFR BLD: 7.4 % (ref 4–15)
NEUTROPHILS # BLD AUTO: 5.7 K/UL (ref 1.8–7.7)
NEUTROPHILS NFR BLD: 67.9 % (ref 38–73)
NITRITE, UA POC OHS: NEGATIVE
NRBC BLD-RTO: 0 /100 WBC
PH, UA POC OHS: 5.5
PLATELET # BLD AUTO: 338 K/UL (ref 150–450)
PMV BLD AUTO: 12.4 FL (ref 9.2–12.9)
POTASSIUM SERPL-SCNC: 4.7 MMOL/L (ref 3.5–5.1)
PROT SERPL-MCNC: 7 G/DL (ref 6–8.4)
PROTEIN, UA POC OHS: ABNORMAL
RBC # BLD AUTO: 4.48 M/UL (ref 4–5.4)
SODIUM SERPL-SCNC: 142 MMOL/L (ref 136–145)
SPECIFIC GRAVITY, UA POC OHS: >=1.03
UROBILINOGEN, UA POC OHS: 0.2
WBC # BLD AUTO: 8.36 K/UL (ref 3.9–12.7)

## 2024-07-30 PROCEDURE — 36415 COLL VENOUS BLD VENIPUNCTURE: CPT | Mod: S$GLB,,, | Performed by: NURSE PRACTITIONER

## 2024-07-30 PROCEDURE — 3008F BODY MASS INDEX DOCD: CPT | Mod: CPTII,S$GLB,, | Performed by: NURSE PRACTITIONER

## 2024-07-30 PROCEDURE — 85025 COMPLETE CBC W/AUTO DIFF WBC: CPT | Performed by: NURSE PRACTITIONER

## 2024-07-30 PROCEDURE — 3074F SYST BP LT 130 MM HG: CPT | Mod: CPTII,S$GLB,, | Performed by: NURSE PRACTITIONER

## 2024-07-30 PROCEDURE — 3078F DIAST BP <80 MM HG: CPT | Mod: CPTII,S$GLB,, | Performed by: NURSE PRACTITIONER

## 2024-07-30 PROCEDURE — 87086 URINE CULTURE/COLONY COUNT: CPT | Performed by: NURSE PRACTITIONER

## 2024-07-30 PROCEDURE — 81003 URINALYSIS AUTO W/O SCOPE: CPT | Mod: QW,S$GLB,, | Performed by: NURSE PRACTITIONER

## 2024-07-30 PROCEDURE — 3044F HG A1C LEVEL LT 7.0%: CPT | Mod: CPTII,S$GLB,, | Performed by: NURSE PRACTITIONER

## 2024-07-30 PROCEDURE — 99214 OFFICE O/P EST MOD 30 MIN: CPT | Mod: S$GLB,,, | Performed by: NURSE PRACTITIONER

## 2024-07-30 PROCEDURE — 83036 HEMOGLOBIN GLYCOSYLATED A1C: CPT | Performed by: NURSE PRACTITIONER

## 2024-07-30 PROCEDURE — 1159F MED LIST DOCD IN RCRD: CPT | Mod: CPTII,S$GLB,, | Performed by: NURSE PRACTITIONER

## 2024-07-30 PROCEDURE — 4010F ACE/ARB THERAPY RXD/TAKEN: CPT | Mod: CPTII,S$GLB,, | Performed by: NURSE PRACTITIONER

## 2024-07-30 PROCEDURE — 80053 COMPREHEN METABOLIC PANEL: CPT | Performed by: NURSE PRACTITIONER

## 2024-07-30 PROCEDURE — 1160F RVW MEDS BY RX/DR IN RCRD: CPT | Mod: CPTII,S$GLB,, | Performed by: NURSE PRACTITIONER

## 2024-07-30 RX ORDER — LEVOFLOXACIN 500 MG/1
500 TABLET, FILM COATED ORAL DAILY
Qty: 5 TABLET | Refills: 0 | Status: SHIPPED | OUTPATIENT
Start: 2024-07-30

## 2024-07-30 NOTE — TELEPHONE ENCOUNTER
Velma Ha LPN [facility] Staff; [facility] Clinical Staff28 minutes ago (11:15 AM)     AB  Patient is asking to please cancel her EGD and colonoscopy on 8/22/24.  She is getting both done with Dr. Ordoñez on 8/7/24.  Thank you.     Tanya Espinoza April M., LPN           Noted - procedure canceled

## 2024-07-30 NOTE — PROGRESS NOTES
"Subjective:       Patient ID: Tanya Espinoza is a 64 y.o. female.    Chief Complaint: Abdominal Pain (lower) and Back Pain    HPI she saw me on 7/8/24 with concerns for yeast infection, bladder pressure. Was given diflucan. Due to using AZO we just sent urine for culture.  Urine grew Klebsiella Pneumoniae. It was sensitive to Bactrim so sent to pharmacy. States she completed full course as prescribed. States now having lower abdominal pain and lower back pain again. She had H. Pylori and was treated earlier this year. She is scheduled to have EGD/Colonoscopy by Dr. Ordoñez on 8/9/24.     States having "hot spots". Will get swollen lymph nodes under arms, elbows. States she gets a red, inflamed lump under the skin that gets really sore. States will last a few days then is gone. She complains of tenderness left axilla area. States she felt a lump yesterday but does not feel it now, just soreness. She had mammogram done 7/15 with normal results.     See ROS    The following portion of the patients history was reviewed and updated as appropriate: allergies, current medications, past medical and surgical history. Past social history and problem list reviewed. Family PMH and Past social history reviewed. Tobacco, Illicit drug use reviewed.      Review of patient's allergies indicates:   Allergen Reactions    Adhesive Rash    Effexor [venlafaxine] Other (See Comments)     Bruising all over body and elevated blood pressure         Current Outpatient Medications:     aspirin (ECOTRIN) 81 MG EC tablet, Take 1 tablet (81 mg total) by mouth once daily., Disp: 90 tablet, Rfl: 1    atorvastatin (LIPITOR) 20 MG tablet, Take 1 tablet (20 mg total) by mouth every evening., Disp: 30 tablet, Rfl: 3    digoxin (LANOXIN) 125 mcg tablet, Take 1 tablet (125 mcg total) by mouth once daily., Disp: 30 tablet, Rfl: 11    famotidine (PEPCID) 40 MG tablet, Take 1 tablet (40 mg total) by mouth every evening., Disp: 90 tablet, Rfl: 3    " furosemide (LASIX) 40 MG tablet, Take 1 tablet (40 mg total) by mouth once daily., Disp: 30 tablet, Rfl: 1    metoprolol succinate (TOPROL-XL) 25 MG 24 hr tablet, Take 0.5 tablets (12.5 mg total) by mouth once daily., Disp: 15 tablet, Rfl: 3    multivitamin with minerals tablet, Take 1 tablet by mouth once daily., Disp: , Rfl:     pantoprazole (PROTONIX) 40 MG tablet, Take 1 tablet (40 mg total) by mouth 2 (two) times daily., Disp: 60 tablet, Rfl: 3    sacubitriL-valsartan (ENTRESTO) 24-26 mg per tablet, Take 1 tablet by mouth 2 (two) times daily., Disp: 60 tablet, Rfl: 1    sulfamethoxazole-trimethoprim 800-160mg (BACTRIM DS) 800-160 mg Tab, Take 1 tablet by mouth 2 (two) times daily., Disp: 8 tablet, Rfl: 0    Past Medical History:   Diagnosis Date    ADHD (attention deficit hyperactivity disorder)     Anemia     Anticoagulant long-term use     Anxiety     CHF (congestive heart failure)     Coronary artery disease     CAD    Elevated LFTs     elevated enzymes    GERD (gastroesophageal reflux disease) 04/24/2021    Headache     Hepatomegaly     Hyperlipemia 04/24/2021    Hypertension     LBBB (left bundle branch block)     Lower extremity edema     Major depressive disorder, recurrent episode, mild 01/03/2022    Obesity     Paroxysmal A-fib     Skin cancer     skin ca       Past Surgical History:   Procedure Laterality Date    ABLATION  04/25/2021    to heart for a-fib x 2    CORONARY ANGIOGRAPHY N/A 05/06/2021    Procedure: ANGIOGRAM, CORONARY ARTERY;  Surgeon: Jesus Sol MD;  Location: [facility] CATH;  Service: Cardiology;  Laterality: N/A;    LEFT HEART CATHETERIZATION N/A 05/06/2021    Procedure: Left heart cath;  Surgeon: Jesus Sol MD;  Location: [facility]ATH;  Service: Cardiology;  Laterality: N/A;    LEFT HEART CATHETERIZATION  03/21/2024    Procedure: Left heart cath;  Surgeon: Jorge Hinkle MD;  Location: [facility] CATH;  Service: Cardiology;;    TONSILLECTOMY      TUBAL LIGATION         Social  History     Socioeconomic History    Marital status:    Occupational History    Occupation: refurbish [employer]   Tobacco Use    Smoking status: Former     Current packs/day: 0.00     Average packs/day: 1 pack/day for 40.0 years (40.0 ttl pk-yrs)     Types: Cigarettes     Quit date: 2023     Years since quittin.1    Smokeless tobacco: Never    Tobacco comments:     down to 0.25 pack of  cigarettes per day   Substance and Sexual Activity    Alcohol use: Not Currently    Drug use: No     Social Determinants of Health     Food Insecurity: No Food Insecurity (3/18/2024)    Hunger Vital Sign     Worried About Running Out of Food in the Last Year: Never true     Ran Out of Food in the Last Year: Never true   Transportation Needs: No Transportation Needs (3/18/2024)    PRAPARE - Transportation     Lack of Transportation (Medical): No     Lack of Transportation (Non-Medical): No   Housing Stability: Low Risk  (3/18/2024)    Housing Stability Vital Sign     Unable to Pay for Housing in the Last Year: No     Number of Places Lived in the Last Year: 1     Unstable Housing in the Last Year: No     Review of Systems   Constitutional:  Positive for fatigue. Negative for fever.   HENT:  Positive for congestion and rhinorrhea. Negative for sinus pain and sore throat.    Eyes:  Negative for visual disturbance.   Respiratory:  Positive for cough (productive.). Negative for chest tightness, shortness of breath and wheezing.    Cardiovascular:  Negative for chest pain, palpitations and leg swelling.   Gastrointestinal:  Positive for abdominal pain. Negative for blood in stool, diarrhea, nausea and vomiting.   Genitourinary:  Positive for difficulty urinating, flank pain, frequency and urgency.        Increased pressure.  Feels decreased urine output.    Musculoskeletal:  Positive for arthralgias. Negative for back pain and gait problem.   Skin:         See HPI   Neurological:  Negative for headaches.  "  Psychiatric/Behavioral:  Negative for dysphoric mood and sleep disturbance. The patient is not nervous/anxious.        Objective:      /74 (BP Location: Left arm, Patient Position: Sitting, BP Method: Large (Manual))   Pulse 76   Temp 98.2 °F (36.8 °C)   Ht 5' 8" (1.727 m)   Wt 95.7 kg (210 lb 13.9 oz)   LMP 10/28/2016 (Approximate)   SpO2 96%   BMI 32.06 kg/m²      Physical Exam  Constitutional:       Appearance: Normal appearance. She is obese.   HENT:      Head: Normocephalic.      Nose: Rhinorrhea present. No congestion.      Mouth/Throat:      Pharynx: No oropharyngeal exudate or posterior oropharyngeal erythema.   Eyes:      Pupils: Pupils are equal, round, and reactive to light.   Neck:      Thyroid: No thyromegaly.      Vascular: No carotid bruit.   Cardiovascular:      Rate and Rhythm: Normal rate and regular rhythm.      Pulses: Normal pulses.      Heart sounds: Normal heart sounds. No murmur heard.  Pulmonary:      Effort: Pulmonary effort is normal.      Breath sounds: Normal breath sounds. No decreased breath sounds or wheezing.   Abdominal:      General: Bowel sounds are normal.      Tenderness: There is abdominal tenderness in the epigastric area and suprapubic area. There is right CVA tenderness. There is no left CVA tenderness.   Musculoskeletal:         General: Normal range of motion.      Cervical back: Normal range of motion. No muscular tenderness.      Right lower leg: No edema.      Left lower leg: No edema.      Comments: Gait normal.  strong, equal   Lymphadenopathy:      Head:      Right side of head: No submandibular adenopathy.      Left side of head: No submandibular adenopathy.      Cervical: No cervical adenopathy.      Upper Body:      Right upper body: No axillary adenopathy.      Left upper body: No axillary adenopathy.      Comments: She does complain of tenderness to area between axilla and upper outer quadrant of left breast.    Skin:     General: Skin is " warm and dry.      Capillary Refill: Capillary refill takes less than 2 seconds.      Findings: No rash.   Neurological:      General: No focal deficit present.      Mental Status: She is alert.   Psychiatric:         Attention and Perception: Attention and perception normal.         Mood and Affect: Mood and affect normal.         Speech: Speech normal.         Behavior: Behavior normal.         Assessment:       1. Fever, unspecified fever cause    2. Prediabetes    3. Lower abdominal pain    4. Urinary tract infection with hematuria, site unspecified        Plan:       Fever, unspecified fever cause. Motrin/tylenol prn. Will draw labs. Send urine for culture  -     CBC Auto Differential  -     Comprehensive Metabolic Panel  -     POCT Urinalysis(Instrument)  -     CULTURE, URINE    Prediabetes: check labs.     -     Hemoglobin A1C    Lower abdominal pain: will get CT to check for kidney stones.   -     CT Renal Stone Study ABD Pelvis WO; Future; Expected date: 07/30/2024  -     POCT Urinalysis(Instrument)  -     CULTURE, URINE    Urinary tract infection with hematuria, site unspecified: urine showed small amount bilirubin, trace ketones, trace blood, trace protein, small WBC    Other orders  -     levoFLOXacin (LEVAQUIN) 500 MG tablet; Take 1 tablet (500 mg total) by mouth once daily.  Dispense: 5 tablet; Refill: 0       Continue current medication  Take medications only as prescribed  Healthy diet, exercise  Adequate rest  Adequate hydration  Avoid allergens  Avoid excessive caffeine     Follow up with PCP as scheduled, sooner if symptoms worsen

## 2024-07-31 ENCOUNTER — PATIENT MESSAGE (OUTPATIENT)
Dept: FAMILY MEDICINE | Facility: CLINIC | Age: 64
End: 2024-07-31

## 2024-07-31 ENCOUNTER — HOSPITAL ENCOUNTER (OUTPATIENT)
Dept: RADIOLOGY | Facility: HOSPITAL | Age: 64
Discharge: HOME | End: 2024-07-31
Attending: NURSE PRACTITIONER
Payer: COMMERCIAL

## 2024-07-31 DIAGNOSIS — R10.30 LOWER ABDOMINAL PAIN: ICD-10-CM

## 2024-07-31 PROCEDURE — 74176 CT ABD & PELVIS W/O CONTRAST: CPT | Mod: TC

## 2024-07-31 PROCEDURE — 74176 CT ABD & PELVIS W/O CONTRAST: CPT | Mod: 26,,, | Performed by: RADIOLOGY

## 2024-08-01 ENCOUNTER — PATIENT MESSAGE (OUTPATIENT)
Dept: FAMILY MEDICINE | Facility: CLINIC | Age: 64
End: 2024-08-01
Payer: COMMERCIAL

## 2024-08-01 LAB
BACTERIA UR CULT: NORMAL
BACTERIA UR CULT: NORMAL

## 2024-08-08 ENCOUNTER — OFFICE VISIT (OUTPATIENT)
Dept: FAMILY MEDICINE | Facility: CLINIC | Age: 64
End: 2024-08-08
Payer: COMMERCIAL

## 2024-08-08 ENCOUNTER — PATIENT OUTREACH (OUTPATIENT)
Dept: ADMINISTRATIVE | Facility: HOSPITAL | Age: 64
End: 2024-08-08
Payer: COMMERCIAL

## 2024-08-08 ENCOUNTER — TELEPHONE (OUTPATIENT)
Dept: FAMILY MEDICINE | Facility: CLINIC | Age: 64
End: 2024-08-08

## 2024-08-08 VITALS
BODY MASS INDEX: 31.96 KG/M2 | RESPIRATION RATE: 17 BRPM | DIASTOLIC BLOOD PRESSURE: 60 MMHG | HEIGHT: 68 IN | WEIGHT: 210.88 LBS | TEMPERATURE: 98 F | OXYGEN SATURATION: 97 % | SYSTOLIC BLOOD PRESSURE: 100 MMHG | HEART RATE: 85 BPM

## 2024-08-08 DIAGNOSIS — G43.009 MIGRAINE WITHOUT AURA AND WITHOUT STATUS MIGRAINOSUS, NOT INTRACTABLE: ICD-10-CM

## 2024-08-08 DIAGNOSIS — R73.03 PREDIABETES: ICD-10-CM

## 2024-08-08 DIAGNOSIS — Z23 NEED FOR VACCINATION AGAINST STREPTOCOCCUS PNEUMONIAE: ICD-10-CM

## 2024-08-08 DIAGNOSIS — E66.09 CLASS 1 OBESITY DUE TO EXCESS CALORIES WITH SERIOUS COMORBIDITY AND BODY MASS INDEX (BMI) OF 31.0 TO 31.9 IN ADULT: ICD-10-CM

## 2024-08-08 DIAGNOSIS — I42.8 NICM (NONISCHEMIC CARDIOMYOPATHY): ICD-10-CM

## 2024-08-08 DIAGNOSIS — E78.5 HYPERLIPIDEMIA, UNSPECIFIED HYPERLIPIDEMIA TYPE: ICD-10-CM

## 2024-08-08 DIAGNOSIS — Z00.00 WELL ADULT EXAM: Primary | ICD-10-CM

## 2024-08-08 DIAGNOSIS — I48.0 PAROXYSMAL ATRIAL FIBRILLATION: ICD-10-CM

## 2024-08-08 DIAGNOSIS — I50.22 CHRONIC SYSTOLIC HEART FAILURE: ICD-10-CM

## 2024-08-08 DIAGNOSIS — I67.1 NONRUPTURED CEREBRAL ANEURYSM: ICD-10-CM

## 2024-08-08 DIAGNOSIS — L50.8 CHRONIC URTICARIA: ICD-10-CM

## 2024-08-08 DIAGNOSIS — I10 PRIMARY HYPERTENSION: ICD-10-CM

## 2024-08-08 DIAGNOSIS — K21.00 GASTROESOPHAGEAL REFLUX DISEASE WITH ESOPHAGITIS WITHOUT HEMORRHAGE: ICD-10-CM

## 2024-08-08 DIAGNOSIS — K76.0 FATTY LIVER: ICD-10-CM

## 2024-08-08 DIAGNOSIS — I25.10 CORONARY ARTERY DISEASE INVOLVING NATIVE CORONARY ARTERY OF NATIVE HEART WITHOUT ANGINA PECTORIS: ICD-10-CM

## 2024-08-08 RX ORDER — PROMETHAZINE HYDROCHLORIDE 25 MG/1
25 TABLET ORAL
COMMUNITY
Start: 2024-07-26

## 2024-08-14 ENCOUNTER — TELEPHONE (OUTPATIENT)
Dept: NEUROLOGY | Facility: CLINIC | Age: 64
End: 2024-08-14
Payer: COMMERCIAL

## 2024-08-14 NOTE — TELEPHONE ENCOUNTER
Spoke with Pt to confirm her 8/15 appt with Aruna, Pt gave verbal confirmation and stated she will bring in her current medications for discussion.

## 2024-08-15 ENCOUNTER — PATIENT MESSAGE (OUTPATIENT)
Dept: FAMILY MEDICINE | Facility: CLINIC | Age: 64
End: 2024-08-15
Payer: COMMERCIAL

## 2024-08-15 ENCOUNTER — OFFICE VISIT (OUTPATIENT)
Dept: NEUROLOGY | Facility: CLINIC | Age: 64
End: 2024-08-15
Payer: COMMERCIAL

## 2024-08-15 VITALS
DIASTOLIC BLOOD PRESSURE: 74 MMHG | WEIGHT: 213.06 LBS | SYSTOLIC BLOOD PRESSURE: 118 MMHG | HEART RATE: 87 BPM | BODY MASS INDEX: 32.29 KG/M2 | HEIGHT: 68 IN

## 2024-08-15 DIAGNOSIS — R06.00 DYSPNEA, UNSPECIFIED TYPE: Primary | ICD-10-CM

## 2024-08-15 PROCEDURE — 1160F RVW MEDS BY RX/DR IN RCRD: CPT | Mod: CPTII,S$GLB,, | Performed by: NURSE PRACTITIONER

## 2024-08-15 PROCEDURE — 3044F HG A1C LEVEL LT 7.0%: CPT | Mod: CPTII,S$GLB,, | Performed by: NURSE PRACTITIONER

## 2024-08-15 PROCEDURE — 1159F MED LIST DOCD IN RCRD: CPT | Mod: CPTII,S$GLB,, | Performed by: NURSE PRACTITIONER

## 2024-08-15 PROCEDURE — 4010F ACE/ARB THERAPY RXD/TAKEN: CPT | Mod: CPTII,S$GLB,, | Performed by: NURSE PRACTITIONER

## 2024-08-15 PROCEDURE — 3074F SYST BP LT 130 MM HG: CPT | Mod: CPTII,S$GLB,, | Performed by: NURSE PRACTITIONER

## 2024-08-15 PROCEDURE — 3078F DIAST BP <80 MM HG: CPT | Mod: CPTII,S$GLB,, | Performed by: NURSE PRACTITIONER

## 2024-08-15 PROCEDURE — 99215 OFFICE O/P EST HI 40 MIN: CPT | Mod: S$GLB,,, | Performed by: NURSE PRACTITIONER

## 2024-08-15 PROCEDURE — 99999 PR PBB SHADOW E&M-EST. PATIENT-LVL III: CPT | Mod: PBBFAC,,, | Performed by: NURSE PRACTITIONER

## 2024-08-15 PROCEDURE — 96116 NUBHVL XM PHYS/QHP 1ST HR: CPT | Mod: 59,S$GLB,, | Performed by: NURSE PRACTITIONER

## 2024-08-15 PROCEDURE — 3008F BODY MASS INDEX DOCD: CPT | Mod: CPTII,S$GLB,, | Performed by: NURSE PRACTITIONER

## 2024-08-15 NOTE — PROGRESS NOTES
"[facility]     PATIENT: Tanya Espinoza  VISIT DATE: 2024  MRN: [id number]  PRIMARY PROVIDER: Wendy Rodriguez DO  : 1960       Chief complaint:      History of present illness:      Ms. Espinoza is a 62-year-old right-handed female who presents today to the [facility] due to concerns related to progressive neurocognitive impairment.   Ms. Espinoza is accompanied by the  who participates in providing history.  Additional information is obtained by reviewing available medical records.  The patient was last seen in the clinic with Dr. Francisco on 2022. Since that visit, she was hospitalized for five days in March due to sepsis and pneumonia. In April, she returned to the emergency department with complaints of chest pain. While hospitalized, she was diagnosed with congestive heart failure, but after seeking a second opinion, she was informed that she was not in heart failure. Following this, the patient discontinued all her medications four days ago.  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  The patient has also stopped taking gabapentin due to running out of prescription refills. She reports difficulty sleeping, typically going to bed around 10:00 PM but not falling asleep until 1:00 or 2:00 AM, and waking up around 6:00 AM. She is no longer taking ramelteon at bedtime and does not recall why she discontinued it. She reports her headaches have decreased in intensity. Additionally, she was treated for H. pylori in May of this year.      Relevant Background/Context  Known Relevant Genetics:  There is no known relevant genetic testing available.  Known Relevant Family " history:  No Known Relevant Family History.  The family denies a history of early/late onset cognitive impairment.  The family denies a history of movement disorder (PD, PDD, tremor, etc).  The family denies a history of motor neuron disease (ALS).  The family denies a history of developmental learning disorder (Dyslexia, ADHD, ASD, etc.).  The family denies a history of mood/substance abuse disorder (MDD, GUANAKITO, Schizophrenia, etc.).  Developmental/Milestones:  The patient/family report no known birth complications or early life problems. The patient met all developmental milestones.  Learning Disorders:  The patient/family report no signs or symptoms suggestive of developmental learning disorder.  Education:  12 years of formal education.  HS.  Social History:  She is a smoker and we addressed smoking cessation today.  Relevant Medical History:  ADHD (attention deficit hyperactivity disorder)  Anticoagulant long-term use  Anxiety  Coronary artery disease  Elevated LFTs  GERD (gastroesophageal reflux disease) 4/24/2021  Hyperlipemia 4/24/2021  Hypertension  LBBB (left bundle branch block)  Major depressive disorder, recurrent episode, mild 1/3/2022  Obesity  Paroxysmal A-fib  Relevant Exposure/Trauma to CNS:  Traumatic Brain Injury or Concussions - One episode of TBI with post concussive symptoms in early January 2022  No History of Chronic Mood Disorder  No History of Chronic Stress  No History of Chronic Substance Abuse  No History of Malnutrition  No History of Toxic Exposure     Neurocognitive Disorder Features  Onset/Duration:  May 2021 (~9-month)  First Symptom:  Memory impairment  Progression:  Step-wise Progressive  Clinical Course:  Psychiatrist (03/05/2013)  Type: Chart Review. She has got a. History of adult ADHD and depression. She says she has been off of her. Medications now for 13 years. She is starting a business in getting involved in. Things and feels like she needs to restart the medication because  of. Inattention and unable to finish task.  Psychiatrist (07/05/2017)  Type: Chart Review. She has ADD dx 20 years ago. She was on treatment for a while when dx then came off of treatment for many years. Four years ago she restarted. She was originally dx through psychiatry. She says before treatment she was very distractible and would often forget to pay bills. She would procrastinate and had trouble with her memory because her mind would go so fast. She often missed appointments and did not finish work. It was interfering in her job. 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 This is physical type of work. She works all hours of the day and usually 6 days a week. She takes her long acting adderrall at 6:30 am and then her short acting dose of 11 am. She takes everyday day. She denies any side effects and adds that it has helped her to be so much more productive and organized. No evidence of abuse by . Her last fill was 6/15/17 #30 on both adderrall XR and adderall short acting. Rx given today with stop date of 8/13/17. Good control on her reigmen and has been taking for the last 4 years. Will refer to [facility] to manage. No side effects.  Neurologist (01/11/2022)  Type: Chart Review. The patient STATES THAT SHE HAS BEEN HAVING RACING HEART ,S/P FALL,FACIAL PARESTHESIA, HUSBANDS STATES GETS CONFUSED, ANEUERYSM 3. 5 CM, AGGRAVATED WITH NO APPT WITH NEURO AT [facility],STILL SMOKES,SAW NEURORADIOLOGY NO COILING, LAST RFA PER DR COLE 6 MO AGO, SEE ROS.  Neurologist (01/18/2022)  Type: Chart Review. Presents with multiple complaints. She is accompanied by her  who assists with the history. She is having episodic L facial numbness followed by arm heaviness. Followed by headache. + photo/phonophobia, + nausea. Mother with migraine. Abdominal pain as a child. She fell a few weeks ago and has had worse headaches that are daily with associated neck stiffness. She reports memory trouble x 6  "months: forgetfulness (leaves water on, forgets to brush hair). Poor sleep. + anxiety. ADHD. Stopped Adderall recently since CAD. Symptoms worse since. Tinnitus x few months. Few weeks of change in taste and laundry detergent smelled different. Started topiramate before taste change. Gait trouble: Occasional "foot crosses the other" since she stopped Adderall. 1. Cognitive disturbance: exam suggests attention/concentration deficit; suspect exacerbation due to discontinuation of Adderall; insomnia/anxiety likely contributing.  [facility] - Jt Francisco MD. Neurologist (02/10/2022)  Type: Chart Review. The patient presents with her  reporting a 1 year history of non bothersome memory deficits and brain fog that have acutely worsened within the last month and a half. The patient reports a lifetime history of ADHD like symptoms that have interfered with her ability to perform at school. The patient will be diagnosed with ADHD in her mid 20s would briefly be on Adderall before stopping during pregnancy. The patient would restart again in her 30s however given that her partner at the time was a drug abuser, she elected to stop taking the medication to prevent it being stolen. The patient restart Adderall in her 40s and has been subsequently on the medication up until the last year. Over last 3 years the patient has had 2 cardiac ablations for chronic atrial fibrillation. The last 1 was in late 2020. In May 2021 the patient had new onset left facial numbness and weakness thought to be a stroke. The patient was evaluated [facility]. The patient was found to have a presumably incidental basilar tip aneurysm without evidence of acute ischemic stroke. The patient was scheduled for annual follow-up. Within the last calendar year the patient has had frequent recurrent episodes of left facial numbness and headache. Headaches are relatively frequent however will continue to have left facial numbness with " "and without headaches. The patient has been managed on venlafaxine Topamax and Nurtec without significant symptomatic benefit. In July 2021 the patient contracted COVID. The patient has subsequently mild brain fog and memory deficits following this exposure alongside anosmia since July 2021. In late 2021 it was recommended to her by her cardiologist to stop Adderall given her chronic atrial fibrillation despite to ablation. The patient discontinued Adderall after Christmas 2021. Within the week following this the patient reported increase in her in attention/ concentration deficits that were bothersome. In January 2, 2022 the patient had a no other symptomatic episode of left facial weakness. This prior to [facility] for evaluation. MRI done showed no evidence of acute ischemic stroke. The patient was subsequently discharged for outpatient management. Within less than 7 days of this event the patient had a ground level fall. The patient has no recollection of this fall. The patient does not recollect whether not she passed out or tripped. The patient does not know if she was unconscious following this fall. The patient did not protect herself upon this fall. The patient landed on her face breaking her nose in damaging her teeth. Following this ground level fall with facial trauma for which she did not seek out medical attention the patient reports new onset dysgeusia. She now reports everything "smells like garbage". This is reportedly resulted in a decrease in po Intake. The patient is unable to tolerate any food due to smell. She is only able to eat relatively dull foods and flavorless liquids and feels like she has lost weight over the last 2 weeks. During the same time frame the patient and her family report acute worsening in concentration and memory deficits. The patient reports having difficulty focusing on anything at work. The patient will often walk around the store almost and aimless state not knowing which " she is doing. This is reportedly resulted in increasing distress and irritability. The patient reports sometimes driving through red lights while driving home. She feels like she has had a complete breakdown and is near crying during the evaluation. The patient reports being told that there are other medications other than Adderall for ADHD. The patient is not following up with her physician group that was already prescribing her ADHD medication for unknown/unclear reasons. In the setting of all these issues the patient reports a background insomnia that does not appear to be acutely worsened. The patient typically has difficulty sleeping for any more than 3 hours at a time often awakening and doing some from the room. This is been a problem for many years. The observations made above were discussed with the patient and her family. The patient has a lifetime history of developmental ADHD with variable compliance with Adderall in her 20s, 30s, and has been subsequently consistent with this this her 40s. The patient reports new brain fog prior since beginning either after COVID in July 2021 or stroke-like episode in May 2021. In the months subsequently thereafter the patient had persistent brain fog there was otherwise not bothersome. Cardiology recently recommended the patient discontinue Adderall given 2 cardiac ablations. The patient volitionally discontinued Adderall following Christmas 2021. In the week subsequently thereafter the patient reported new worsening attention/ concentration deficits. The patient was briefly admitted to local hospital for stroke-like episode described as left facial droop. Less than 1 week following his hospitalization the patient had a ground level fall with head trauma. Subsequently thereafter patient's anosmia has turned into dysgeusia with worsening this executive dysfunction out of proportion to simple ADHD medication withdrawal. At this time the patient likely has multifactorial  post concussive syndrome including but not limited to long COVID, recent TBI, Adderall medication withdrawal, and potential stroke. Recommend repeating MRI brain stat without contrast and acquiring  screening labs for reversible cause of cognitive.           Review of cognitive, visuospatial, motor, sensory, and behavioral systems:     Memory:   Ms. Balderas memory has worsened in the past few years.  She does repeat statements or asks the same question repeatedly.  She does have difficulty remembering recent important conversations.  She does have difficulty remembering recent events.  She does forget information within minutes.  Her remote memory is intact.  Her recent retrograde memory is impaired.  Attention:   Her attention and concentration are impaired.  She does  have attentional fluctuations.  She does have difficulty with selective attention.  She does become easily distracted.  She does have difficulty with divided attention.  Executive:   Ms. Balderas cognitive processing speed is slower.  She does have difficulty with working memory.  She does misplace personal items (e.g., keys, cell phone, wallet) more frequently.  She does have difficulty keeping track of her medications.  She does not have difficulty with planning/organizing/completing multistep tasks.  She does not have difficulty with executive attention.  She does have difficulty with flexible thinking.  She does not difficulty with self control.  She is exhibiting new symptoms that suggest they have become more impulsive, rash and/or careless.  Her judgment is impaired.  Language:   Her speech output is unaffected.  She does forget people's names more frequently.  She does have word-finding difficulties.  Ms. Balderas speech is fluent and non-effortful.  Ms. Balderas speech is grammatically intact.  She does not make word substitutions.  She does have difficulty reading.  She does not appear to have impaired comprehension.  Visuospatial:   She has  new visuospatial problems.  She does  become confused or disoriented in *new*, unfamiliar places.  She does not have trouble with navigation.  She does not get lost in familiar places.  Ms. Espinoza does not have visuospatial disorientation.  She does not have difficulty recognizing objects or faces.  She denies problems with driving or parking- she will drive to her doctors office in [place] but not far places.   Motor/Coordination:   Ms. Espinoza does have difficulty with walking.  She does feel imbalanced.  She has  not had any recent falls- reports she hold on to things when she walks because she feels weak.   She does not appear to have new muscle weakness- yes because she feels like she is pulling muscle and strains.   She does not have difficulty buttoning shirts, operating zippers, or manipulating tools/utensils.  Her handwriting has not become micrographic.  She does not have a resting tremor.  She denies having any new involuntary movements and/or muscle jerking.  She does not have swallowing difficulty.  She  new muscle cramps and twitching- repors muscle cramping that has gotten worse due to neurpathy   Sensory:   Ms. Espinoza denies new numbness, tingling, paresthesias, or pain- feet.   Ms. Espinoza denies a loss of vision, blurry vision, or double vision.  Ms. Espinoza  loss of hearing or worsening tinnitus- states has had fot a while and bothers her more at night   Ms. Espinoza does have anosmia.  Sleep:   Ms. Espinoza reports difficulty sleeping.  Ms. Espinoza does have difficulty going to sleep. Will attempt to go tto 2200 and then will usually fall asleep [id number] and wakes up around 0600   Ms. Espinoza reports difficulty staying asleep and/or frequently awakening at night.  Ms. Espinoza does not snore or have witnessed apneas while sleeping- has not been assessed   When she wakes up in the morning, she does not feel well-rested.  She denies dream-enactment behavior.  She reports symptoms suggestive of restless leg  syndrome.  Behavior:   Ms. Espinoza's personality has changed.  She does not have symptoms of disinhibition and social inappropriateness.  She does not have symptoms to suggest a loss of manners or decorum.  She does not appear apathetic or has decreased motivation.  She does  appear to have a change in inertia.She is  Ms. Espinoza's emotional expression has changed.  She does not have emotional blunting or lability.  She does have symptoms of irritability and mood lability.  She has been reported to have new symptoms of agitation, aggression, or violent outbursts.  Her insight into his health and situation is intact.  Her personal hygiene is Impaired.   She is not exhibiting a diminished response to other people's needs and feelings?  She is not exhibiting a diminished social interest, interrelatedness, or personal warmth.  She denies restlessness.  She denies new and/or worsening simple repetitive behaviors.  Her speech has not become simplified or become repetitive/stereotyped-  reports she is rocking when sitting down since 3/2024.  She denies new/worsening complex repetitive/ritualistic compulsions and behaviors.  She does not have symptoms of hyper-religiosity or dogmatism.  Her interests/pleasures have not become restrictive, simplified, interrupting, or repetitive.  She denies a change of self-stimulating behavior.  She denies any changes in eating behavior.  She denies increased consumption of food or substances.  She denies oral exploration or consumption of inedible objects.  Psychiatric:   She does feel depressed.  She is exhibiting symptoms of social withdrawal/indifference.  She does have anxiety.  She does not exhibit cycling behavior.  She does not exhibit hyperactive behavior.  She is not exhibited symptoms of paranoia.  She does not have delusions.  She does not have hallucinations.  She does not have a history of sensitivity to neuroleptic/psychotropic medications.  Medical Review of Systems:    Ms. Espinoza does  have constipation.  Ms. Espinoza does not have urinary incontinence- has had three UTI in the past month and half   Ms. Espinoza denies orthostatic lightheadedness.  Ms. Espinoza's weight is unstable. Comment: 8+ lbs since 5/2024.   Functional status:  Difficulty performing the following Instrumental ADLs:  Housekeeping: yes   Food Preparation: yes   Shopping: yes   Ability to Handle Finances: yes   Transportation/Driving: No  Household Appliances/Stove: yes   Laundry: yes   Difficulty performing the following Basic ADLs:  Dressing: No-  will pick out clothes and she will get dressed   Bathing: No  Toileting: No  Personal hygiene and grooming: No  Feeding: No  Care Management:  Patient/Family Safety Concerns:  Medication Adherence: yes   Home Safety: No  Wandered: No  Firearms: No  Fall Risk: yes   Home Alone: No- is home alone three days a week.        Past Medical History:   Diagnosis Date    ADHD (attention deficit hyperactivity disorder)     Anemia     Anticoagulant long-term use     Anxiety     CHF (congestive heart failure)     Coronary artery disease     CAD    Elevated LFTs     elevated enzymes    GERD (gastroesophageal reflux disease) 04/24/2021    Headache     Hepatomegaly     Hyperlipemia 04/24/2021    Hypertension     LBBB (left bundle branch block)     Lower extremity edema     Major depressive disorder, recurrent episode, mild 01/03/2022    Obesity     Paroxysmal A-fib     Skin cancer     skin ca       Past Surgical History:   Procedure Laterality Date    ABLATION  04/25/2021    to heart for a-fib x 2    COLONOSCOPY  08/07/2024    CORONARY ANGIOGRAPHY N/A 05/06/2021    Procedure: ANGIOGRAM, CORONARY ARTERY;  Surgeon: Jesus Sol MD;  Location: [facility] CATH;  Service: Cardiology;  Laterality: N/A;    LEFT HEART CATHETERIZATION N/A 05/06/2021    Procedure: Left heart cath;  Surgeon: Jesus Sol MD;  Location: [facility]ATH;  Service: Cardiology;  Laterality: N/A;    LEFT HEART  CATHETERIZATION  2024    Procedure: Left heart cath;  Surgeon: Jorge Hinkle MD;  [facility];  Service: Cardiology;;    TONSILLECTOMY      TUBAL LIGATION         Family History   Problem Relation Name Age of Onset    Diabetes Mother      Diverticulitis Mother      Diverticulitis Sister      Pancreatic cancer Brother      Osteoporosis Maternal Grandmother      Colon cancer Neg Hx      Stomach cancer Neg Hx      Ulcerative colitis Neg Hx      Esophageal cancer Neg Hx      Crohn's disease Neg Hx      Celiac disease Neg Hx         Social History     Socioeconomic History    Marital status:    Occupational History    Occupation: [employer] and [employer]   Tobacco Use    Smoking status: Former     Current packs/day: 0.00     Average packs/day: 1 pack/day for 40.0 years (40.0 ttl pk-yrs)     Types: Cigarettes     Quit date: 2023     Years since quittin.2    Smokeless tobacco: Never    Tobacco comments:     down to 0.25 pack of  cigarettes per day   Substance and Sexual Activity    Alcohol use: Not Currently    Drug use: No     Social Determinants of Health     Food Insecurity: No Food Insecurity (3/18/2024)    Hunger Vital Sign     Worried About Running Out of Food in the Last Year: Never true     Ran Out of Food in the Last Year: Never true   Transportation Needs: No Transportation Needs (3/18/2024)    PRAPARE - Transportation     Lack of Transportation (Medical): No     Lack of Transportation (Non-Medical): No   Housing Stability: Low Risk  (3/18/2024)    Housing Stability Vital Sign     Unable to Pay for Housing in the Last Year: No     Number of Places Lived in the Last Year: 1     Unstable Housing in the Last Year: No       Medication:     Current Outpatient Medications on File Prior to Visit   Medication Sig Dispense Refill    aspirin (ECOTRIN) 81 MG EC tablet Take 1 tablet (81 mg total) by mouth once daily. 90 tablet 1    atorvastatin (LIPITOR) 20 MG tablet Take 1  tablet (20 mg total) by mouth every evening. 30 tablet 3    digoxin (LANOXIN) 125 mcg tablet Take 1 tablet (125 mcg total) by mouth once daily. 30 tablet 11    famotidine (PEPCID) 40 MG tablet Take 1 tablet (40 mg total) by mouth every evening. 90 tablet 3    furosemide (LASIX) 40 MG tablet Take 1 tablet (40 mg total) by mouth once daily. 30 tablet 1    levoFLOXacin (LEVAQUIN) 500 MG tablet Take 1 tablet (500 mg total) by mouth once daily. 5 tablet 0    metoprolol succinate (TOPROL-XL) 25 MG 24 hr tablet Take 0.5 tablets (12.5 mg total) by mouth once daily. 15 tablet 3    multivitamin with minerals tablet Take 1 tablet by mouth once daily.      pantoprazole (PROTONIX) 40 MG tablet Take 1 tablet (40 mg total) by mouth 2 (two) times daily. 60 tablet 3    promethazine (PHENERGAN) 25 MG tablet Take 25 mg by mouth.      sacubitriL-valsartan (ENTRESTO) 24-26 mg per tablet Take 1 tablet by mouth 2 (two) times daily. 60 tablet 1     No current facility-administered medications on file prior to visit.        Review of patient's allergies indicates:   Allergen Reactions    Adhesive Rash    Effexor [venlafaxine] Other (See Comments)     Bruising all over body and elevated blood pressure       Medications Reconciliation:   I have reconciled the patient's home medications and discharge medications with the patient/family. I have updated all changes.  Refer to After-Visit Medication List.    Objective:  Vital Signs:  There were no vitals filed for this visit.  Wt Readings from Last 3 Encounters:   08/08/24 0931 95.7 kg (210 lb 13.9 oz)   07/30/24 1031 95.7 kg (210 lb 13.9 oz)   07/08/24 1138 95.7 kg (210 lb 15.7 oz)     There is no height or weight on file to calculate BMI.     Neurological examination:      Mental Status:      Ms. Espinoza is awake; Her energy level appeared normal.  Her attention/concentration is impaired.   Her thought process is not logical or goal-oriented.   Comment: tangential and circumstantial thoughts  She  has no evidence of hallucinations (auditory, visual, olfactory).  She has no evidence of delusions (paranoid, grandiose, bizarre).  She demonstrated impaired judgment based on actions and plans for the future.  She demonstrated appropriate insight based on actions, awareness of her illness, plans for the future.  Throughout the interview, she is cooperative, her eye contact is appropriate.  Cranial Nerves:   Her eyelid assessment showed no apraxia. There was no eyelid dysfunction, retraction, or marks sign.  Her facial sensation was intact to light touch bilaterally.  Her facial strength was normal.  Her facial expression was symmetric and appropriate to the context.  Her hearing was normal bilaterally.  Her tongue showed no evidence of scalloping.  She can protrude their tongue beyond Her lips for >10 sec.  She can move their extended tongue back and forth rapidly.  She had no significant evidence of anterocollis or retrocollis.  Speech/Language:   Ms. Balderas speech was fluent, non-effortful, and her rate was appropriate to the context.  Her speech volume is within normal range and appropriate to the context.  Her speech rate is normal.  Her respirations are within normal range and appropriate to context.  Her speech timbre is normal.  She has no articulation (segmental features) errors.  Motor:   Ms. Espinosas bilateral upper extremity muscle bulk is appropriate.    Assessment of motor strength was symmetric and at minimal anti-gravity.  There is no pronator or downward drift.  There is no upward drift.  There is no outward/diagonal drift.  There is no myoclonus observed in Ms. Espinoza's bilateral upper and lower extremities.  There are no fasciculations observed in Ms. Espinoza's bilateral upper and lower extremities.         Neuropsychological Evaluation Summary:     Prior Neurocognitive/Neuropsychological Evaluations  Summary from EMR:  2022-02-10:  Executive predominant multidomain MCI  Moderate Visuospatial  Impairment: visuospatial construction.  Moderate Executive Impairment: She scored >3 standard deviations below the norm on at least one measure. She had difficulty with semantic fluency, lexical fluency, semantic fluency.  Moderate Language Impairment: abstract.  Very Mild Memory Impairment: recall.  Very Mild Attention Impairment: orientation.  MMSE 28/30: MMSE Score suggestive of normal to questionable cognitive impairment.  MOCA 23/30: MOCA Score suggestive of mild cognitive impairment.  BEHAV5+ 4/6: See ROS section for a full description    Neurocognitive Evaluation completed on 02/22/2022:  Neuropsychiatric/Behavioral Focused Evaluation Assessment   BEHAV5+ 4/6 See ROS section for a full description   Laboratories:     Lab Date Value [Reference]   Coagulopathy Screening           aPTT 05/15/2021  32.0 [24.6 - 36.7 sec]  32.0 [24.6 - 36.7 sec]      Metabolic Screening   Free T4 2021, May-15    0.78 [0.78 - 2.19 ng/dL]      Hemoglobin A1C External 02/10/2022  5.3 [4.0 - 5.6 %]  5.3 [4.0 - 5.6 %]      Homocysteine 02/10/2022  6.8 [4.0 - 15.5 umol/L]      Methlymalonic Acid 01/18/20222022, Jan-18    0.40  0.29  0.29      Procalcitonin 02/10/2022  0.07  0.07      T4 Total 02/10/2022  6.7 [4.5 - 11.5 ug/dL]      TSH 05/15/2021  1.173 [0.400 - 4.000 uIU/mL]  2.115 [0.400 - 4.000 uIU/mL]  2.115 [0.400 - 4.000 uIU/mL]      Glucose 2022, Feb-10    122 (H) [70 - 110 mg/dL]      Albumin 2022, Feb-10  2022, Jan-02 2021, Jul-19    3.7 [3.5 - 5.2 g/dL]  4.1 [3.5 - 5.2 g/dL]  4.4 [3.5 - 5.2 g/dL]      Alkaline Phosphatase 2022, Feb-10  2022, Jan-02 2021, Jul-19    102 [55 - 135 U/L]  143 [55 - 135 U/L]  96 [55 - 135 U/L]      ALT 2022, Feb-10  2022, Jan-02 2021, Jul-19    29 [10 - 44 U/L]  153 (H) [10 - 44 U/L]  34 [10 - 44 U/L]      AST 2022, Feb-10  2022, Jan-02 2021, Jul-19    24 [10 - 40 U/L]  63 (H) [10 - 40 U/L]  48 (H) [10 - 40 U/L]      BILIRUBIN TOTAL 2022, Feb-10  2022, Jan-02 2021, Jul-19    0.6 [0.1 - 1.0  mg/dL]  0.4 [0.1 - 1.0 mg/dL]  0.7 [0.1 - 1.0 mg/dL]      PROTEIN TOTAL 2022, Feb-10  2022, Jan-02 2021, Jul-19    7.9 [6.0 - 8.4 g/dL]  7.5 [6.0 - 8.4 g/dL]  7.9 [6.0 - 8.4 g/dL]      Cholesterol 2022, Feb-10    172 [120 - 199 mg/dL]      HDL 2022, Feb-10    67 [40 - 75 mg/dL]      Non-HDL Cholesterol 2022, Feb-10    105 [mg/dL]      Triglycerides 02/10/2022  156 (H) [30 - 150 mg/dL]      B12 Def. Methylmalonic Acid 02/10/2022  0.38      Folate 02/10/2022  19.0 [4.0 - 24.0 ng/mL]  11.9 [4.0 - 24.0 ng/mL]      Thiamine 02/10/2022  102 [38 - 122 ug/L]      Vitamin B-12 02/10/2022  324 [180 - 914 ng/L]  545 [210 - 950 pg/mL]  545 [180 - 914 ng/L]      Neuroendocrine/Electrolyte Screening   Magnesium 05/15/68420804, May-15    2.1 [1.6 - 2.6 mg/dL]  2.2 [1.6 - 2.6 mg/dL]      Phosphorus 2021, May-15    3.8 [2.7 - 4.5 mg/dL]      BUN 2022, Feb-10    24 (H) [6 - 20 mg/dL]      Chloride 2022, Feb-10    107 [95 - 110 mmol/L]      Creatinine 2022, Feb-10    1.0 [0.5 - 1.4 mg/dL]      Potassium 2022, Feb-10    3.6 [3.5 - 5.1 mmol/L]      Sodium 2022, Feb-10    141 [136 - 145 mmol/L]      Infectious Disease/Immunocompromised Screening   SARS-CoV-2 RNA, Amplification, Qual 02/10/2022  Positive (A)      HIV 1/2 Ag/Ab 02/10/2022  Negative      Syphilis Treponemal Ab 2022, Feb-10    Nonreactive [Nonreactive]      Standard Hematology Screen   Hematocrit 2022, Feb-10    39.9 [37.0 - 48.5 %]      Hemoglobin 2022, Feb-10    12.8 [12.0 - 16.0 g/dL]      MCV 2022, Feb-10    89 [82 - 98 fL]      Platelets 2022, Feb-10    277 [150 - 450 K/uL]      Delirium Screening   Bacteria, UA 2021, May-14    Negative [Negative /hpf]           Neuroimaging:    MRI brain/head without contrast on 1/2/22  Formal interpretation by Radiology:  Normal parenchymal volume. Stable scattered T2 FLAIR hyperintense white matter foci are present, likely related to chronic microvascular ischemic change. No parenchymal restricted diffusion. No evidence of  intracranial hemorrhage. No extra-axial fluid collection or mass. No intracranial mass effect. No hydrocephalus. Midline structures have a normal configuration. Visualized pituitary gland and infundibulum are normal. Visualized major intracranial vascular structures demonstrate normal flow voids and are normal in course and caliber.  Independently reviewed radiological imaging by Jt Jain MD. MPH. Behavioral Neurologist  T1: Largely normal brain slight widening of the marginal sulci in the right hemisphere. Cannot comment on hippocampi as no coronal views available. Slight thinning of the posterior cingulate. Otherwise largely normal brain.  T2/FLAIR: Subtle white matter hyperintensity left pontine region with wispy nature possible wallerian degeneration. Scattered high dorsal punctuated subcortical white matter changes with both the large somewhat oval punctuated lesions in the left subcortical hemisphere and mild intermittent hyperintensities in the right Zeng in turn a watershed territories likely suggestive of uncontrolled atherosclerotic risk factors.  DWI/ADC: No Significant DWI hyperintensities/hypointensities. No ADC correlation.  SWI/GRE: No Significant hypointensities to suggest cortical/subcortical hemosiderin deposition.  Impression: : Largely normal brain with slight sulcal widening of the right marginal sulcus. Scattered subcortical white matter disease most well distributed in the internal watershed territories left greater than right consistent with atherosclerotic microvascular disease.    MRI brain/head with and without contrast on 2/10/2022  Formal interpretation by Radiology:  No acute intraparenchymal hemorrhage or infarction. No abnormal intracranial enhancement.  Independently reviewed radiological imaging by Jt Jain MD. MPH. Behavioral Neurologist  T1: No significant cortical atrophy with age-appropriate changes. Subcortical nuclei and hippocampi are without  significant atrophy. Possible widening of the marginal sulci however within range of normal variant.  T2/FLAIR: Scattered subcortical white matter hyperintensities in the bilateral corona radiata left greater than right stable compared to early January scan. Atypical left pontine hyperintensity adjacent to facial nerve/trigeminal nuclei.  DWI/ADC: No Significant DWI hyperintensities/hypointensities. No ADC correlation.  SWI/GRE: No Significant hypointensities to suggest cortical/subcortical hemosiderin deposition.  Impression: : Scattered subcortical white matter hyperintensities left greater than right consistent with hypertensive microvascular ischemic changes. Atypical left pontine hyperintensity adjacent to facial / trigeminal nerve nuclei stable compared to January 2022 scan.     Procedures:    Electrocardiogram on 1/2/22  Formal interpretation:  Vent. Rate : 085 BPM     Atrial Rate : 085 BPM    P-R Int : 170 ms          QRS Dur : 092 ms     QT Int : 386 ms       P-R-T Axes : 053 071 079 degrees    QTc Int : 459 ms Sinus rhythm with Premature supraventricular complexes Nonspecific ST abnormality Otherwise normal ECG  Independently reviewed Electrocardiogram by Jt Jain MD. MPH. Behavioral Neurologist  Impression: : Received ECG has no evidence of sinus node disease. HR (>=50-60). Prolonged AZ interval (>0.22 s). Broad QRS complex (> 0.12 s).     Clinical Summary:     Ms. Espinoza is a 64-year-old right-handed female with a relevant past medical history of PAF, HTN, HLD, ADHD, CAD, MDD, who presents reporting a 2-year history of step-wise progressive neurocognitive impairment.       The clinical history is suggestive of:  Memory Impairment: STM encoding impairment, LTM encoding-retrieval impairment  Attention Impairment: Attention, Selective attention, Sustained attention, Shifting attention  Executive Impairment: Energization, Working Memory, Response Inhibition  Language Impairment: Language  Dysfunction  Motor/Coordination Impairment: Sensory motor integration  Sensory Impairment: Limbic Dysfunction  Behavior Impairment: Emotional Regulation  Psychiatric Impairment: Neurovegetative, Social Coherence, Signal-Noise Dysregulation  The neurological examination is significant for:  Cerebellar Dysfunction: dysdiadochokinesia, rebound, truncal ataxia (walking), stance imbalance (Romberg sign)  Cortical Temporal-Parietal Dysfunction: left-right confusion, dysgraphia  Executive Impairment: thought disorder, judgment  Motor Coordination: gait imbalance (tiptoes)  Movement Disorder (Gait): strength (difficulty rising), abnormal features (difficulty turning), dorsiflexion weakness (heel walking)  Movement Disorder (Hyperkinetic): tremor (postural)  Informal neuropsychology battery is positive (based on age and education) for:  Executive predominant multidomain MCI  BEHAV5+ 4/6: See ROS section for a full description  Neurological imaging  MRI brain/head without contrast (1/2/22): Largely normal brain with slight sulcal widening of the right marginal sulcus. Scattered subcortical white matter disease most well distributed in the internal watershed territories left greater than right consistent with atherosclerotic microvascular disease.  MRI brain/head with and without contrast (2/10/2022): Scattered subcortical white matter hyperintensities left greater than right consistent with hypertensive microvascular ischemic changes. Atypical left pontine hyperintensity adjacent to facial / trigeminal nerve nuclei stable compared to January 2022 scan.        Assessment:        Ms. Espinoza's clinical presentation is dysexecutive predominant mild cognitive impairment (CDR-SOB: 2 - Questionable cognitive impairment).     Ms. Espinoza's clinical syndrome is best described as Mild Cognitive Impairment (MCI-ADRC) (Nelson TSANG, et al. 2011 Alzheimer's & Dementia).  Concern regarding an intraindividual change in cognition  Impairment in  "one or more cognitive domains  Preservation of independence in functional abilities.  Not demented    The patient reports experiencing brain fog and memory deficits since being exposed to COVID-19 in July 2021. In March 2024, she was hospitalized with pneumonia and sepsis. Following COVID-19 exposure, many individuals experience short- and long-term neuropsychiatric symptoms, including cognitive and attention deficits, commonly referred to as "brain fog." For some, these neurological symptoms persist, and ongoing research is focused on understanding the mechanisms behind this brain dysfunction and its potential long-term effects on cognitive health.  The patient also reports an acute and abrupt worsening in attention and concentration. It is important to note that adult ADHD shares many overlapping features with mild cognitive impairment (MCI), including cognitive deficits--particularly in memory, attention, and executive functioning--as well as psychiatric comorbidities such as anxiety, depression, and sleep disturbances. We have discussed the importance of achieving deep, restful sleep and will be making referrals to sleep medicine for further evaluation and recommendations.     #PCS/Insomnia Treatment:  Belsomra is not covered by her insurance however Davigo is . We will begin Davigo 10 mg thirty minutes to one hour before bedtime.    We have made referral to sleep medicine today.   #We will discuss case with headache medicine for further management  #Behavioral/Environmental Treatment  We recommend engaging in activities that stimulate cognitively and socially while avoiding excessive stimulation and fatigue in overwhelmingly complex situations.  We recommend integrating routine and schedule into your daily life. https://www.alzheimersproject.org/news/the-importance-of-routine-and-familiarity-to-persons-with-dementia/  #Health Maintenance/Lifestyle Advice  We have discussed the value in aggressively controlling " "vascular risk factors like hypertension, hyperlipidemia, and Diabetes SBP<130, LDL<100, A1C<7.0.  We discussed the need to optimize lifestyle choices including a heart-healthy diet (e.g., Mediterranean or DASH), increased cardiovascular exercise (goal 150 minutes of moderate-intensity per week), and stay cognitively and socially active.  #Support  We all need support sometimes. Get easy access to local resources, community programs, and services. https://www.communityresourcefinder.org/  Learn more about Cognitive Impairment in [place]: https://www.alz.org/professionals/public-health/state-overview/[place]  #Safety  The Alzheimer's Association administers the nationwide "Safe Return" program with identification bracelets, necklaces, or clothing tags and 24-hour assistance. More information is available online at https://www.alz.org/help-support/caregiving/safety/medicalert-with-24-7-wandering-support  #Follow up:  Follow-up in one month for medication check.     Thank you for allowing us to participate in the care of your patient. Please do not hesitate to contact us with any questions or concerns.     It was a pleasure seeing Ms. Espinoza and we look forward to seeing them at their follow-up visit.     This note is dictated on M*Modal Fluency Direct word recognition program. There are word recognition mistakes that are occasionally missed on review.      Scheduled Follow-up :  Future Appointments   Date Time Provider Department Center   8/15/2024  9:15 AM Aruna Chou NP [facility] NEURO8 [facility]   8/15/2024  1:30 PM Braydon Polanco MD [facility] ALLERGY [place]   9/12/2024 11:45 AM Umberto Ruvalcaba MD [facility] CARDIO [place]   9/18/2024 11:30 AM Wendy Rodirguez DO [facility] FAM MED [place]   12/11/2024 11:30 AM Wendy Rodriguez DO [facility] [place]       After Visit Medication List :     Medication List            Accurate as of August 14, 2024  7:21 PM. If you have any questions, ask your nurse or doctor.            "     CONTINUE taking these medications      aspirin 81 MG EC tablet  Commonly known as: ECOTRIN  Take 1 tablet (81 mg total) by mouth once daily.     atorvastatin 20 MG tablet  Commonly known as: LIPITOR  Take 1 tablet (20 mg total) by mouth every evening.     digoxin 125 mcg tablet  Commonly known as: LANOXIN  Take 1 tablet (125 mcg total) by mouth once daily.     famotidine 40 MG tablet  Commonly known as: PEPCID  Take 1 tablet (40 mg total) by mouth every evening.     furosemide 40 MG tablet  Commonly known as: LASIX  Take 1 tablet (40 mg total) by mouth once daily.     levoFLOXacin 500 MG tablet  Commonly known as: LEVAQUIN  Take 1 tablet (500 mg total) by mouth once daily.     metoprolol succinate 25 MG 24 hr tablet  Commonly known as: TOPROL-XL  Take 0.5 tablets (12.5 mg total) by mouth once daily.     multivitamin with minerals tablet     pantoprazole 40 MG tablet  Commonly known as: PROTONIX  Take 1 tablet (40 mg total) by mouth 2 (two) times daily.     promethazine 25 MG tablet  Commonly known as: PHENERGAN     sacubitriL-valsartan 24-26 mg per tablet  Commonly known as: ENTRESTO  Take 1 tablet by mouth 2 (two) times daily.              Signing Physician:  Aruan Chou NP    Billing:    -----------------------------------------------------------------------------  Billing Statement:  I spent a total of 54 minutes (from 09:15 PM to 10:09 PM) on the day of clinical evaluation, engaging in person in a face-to-face consultation with the patient. More than 50% of this time was devoted to counseling on symptoms, treatment plans, risks, therapeutic options, lifestyle modifications, and safety concerns related to the above diagnoses.  A Review of Systems was completed, encompassing 10 of the 14 systems. All findings were negative, except those noted in the History of Present Illness (HPI) and Review of Systems (ROS) Sections. The systems reviewed were Constitutional (Const), Eyes, Ear/Nose/Throat (ENT), Respiratory  (Resp), Cardiovascular (CV), Gastrointestinal (GI), Genitourinary (), Musculoskeletal (MSK), Skin, and Neurological (Neuro).  I spent a total of 10 minutes reviewing and summarizing records from outside physicians on the day of the clinical evaluation. This review and summary were conducted as described in the History of Present Illness (HPI) and Assessment.  I performed a neurobehavioral status examination on the day of clinical evaluation that included a clinical assessment of thinking, reasoning, and judgment to ensure a comprehensive approach in managing the complex and evolving needs of the patient's neurocognitive condition. Please see above HPI and ROS for full details. This exam was performed on the day of clinical evaluation and included 36 minutes spent on direct face-to-face clinical observation and interview with the patient and 15 minutes spent interpreting test results and preparing the report. The total time of 36 minutes spent on the neurobehavioral status examination is not included in the time spent on evaluation and management coding.  Total Billing time spent on encounter/documentation for this patient's evaluation and management, not including the neurobehavioral status examination: 49 minutes.

## 2024-08-16 ENCOUNTER — TELEPHONE (OUTPATIENT)
Dept: CARDIOLOGY | Facility: CLINIC | Age: 64
End: 2024-08-16
Payer: COMMERCIAL

## 2024-08-19 ENCOUNTER — PATIENT MESSAGE (OUTPATIENT)
Dept: FAMILY MEDICINE | Facility: CLINIC | Age: 64
End: 2024-08-19
Payer: COMMERCIAL

## 2024-08-19 ENCOUNTER — TELEPHONE (OUTPATIENT)
Dept: SLEEP MEDICINE | Facility: CLINIC | Age: 64
End: 2024-08-19
Payer: COMMERCIAL

## 2024-08-19 DIAGNOSIS — R31.9 HEMATURIA, UNSPECIFIED TYPE: Primary | ICD-10-CM

## 2024-08-19 NOTE — TELEPHONE ENCOUNTER
----- Message from Rin Rivera MA sent at 8/19/2024  9:52 AM CDT -----  Name of Who is Calling:KIEL GONSALEZ [[id number]]                What is the request in detail: Pt is requesting a call back to get rescheduled for sometime next week if possible, no schedule coming up. Please assist.                Can the clinic reply by [facility]: No                What Number to Call Back if not in [facility]: [phone]

## 2024-08-20 ENCOUNTER — LAB VISIT (OUTPATIENT)
Dept: LAB | Facility: HOSPITAL | Age: 64
End: 2024-08-20
Attending: INTERNAL MEDICINE
Payer: COMMERCIAL

## 2024-08-20 DIAGNOSIS — R31.9 HEMATURIA, UNSPECIFIED TYPE: ICD-10-CM

## 2024-08-20 PROBLEM — F51.04 PSYCHOPHYSIOLOGICAL INSOMNIA: Status: ACTIVE | Noted: 2024-08-20

## 2024-08-20 LAB
BACTERIA #/AREA URNS HPF: ABNORMAL /HPF
MICROSCOPIC COMMENT: ABNORMAL
RBC #/AREA URNS HPF: 1 /HPF (ref 0–4)
SQUAMOUS #/AREA URNS HPF: 2 /HPF
WBC #/AREA URNS HPF: 2 /HPF (ref 0–5)

## 2024-08-20 PROCEDURE — 87147 CULTURE TYPE IMMUNOLOGIC: CPT | Performed by: INTERNAL MEDICINE

## 2024-08-20 PROCEDURE — 81000 URINALYSIS NONAUTO W/SCOPE: CPT | Mod: PO | Performed by: INTERNAL MEDICINE

## 2024-08-20 PROCEDURE — 87086 URINE CULTURE/COLONY COUNT: CPT | Performed by: INTERNAL MEDICINE

## 2024-08-20 PROCEDURE — 87088 URINE BACTERIA CULTURE: CPT | Performed by: INTERNAL MEDICINE

## 2024-08-21 ENCOUNTER — PATIENT MESSAGE (OUTPATIENT)
Dept: FAMILY MEDICINE | Facility: CLINIC | Age: 64
End: 2024-08-21
Payer: COMMERCIAL

## 2024-08-21 LAB — BACTERIA UR CULT: ABNORMAL

## 2024-08-22 ENCOUNTER — PATIENT MESSAGE (OUTPATIENT)
Dept: FAMILY MEDICINE | Facility: CLINIC | Age: 64
End: 2024-08-22
Payer: COMMERCIAL

## 2024-08-22 RX ORDER — CEPHALEXIN 500 MG/1
500 CAPSULE ORAL EVERY 8 HOURS
Qty: 21 CAPSULE | Refills: 0 | Status: SHIPPED | OUTPATIENT
Start: 2024-08-22

## 2024-09-18 ENCOUNTER — OFFICE VISIT (OUTPATIENT)
Dept: FAMILY MEDICINE | Facility: CLINIC | Age: 64
End: 2024-09-18
Payer: COMMERCIAL

## 2024-09-18 VITALS
SYSTOLIC BLOOD PRESSURE: 112 MMHG | RESPIRATION RATE: 17 BRPM | TEMPERATURE: 98 F | WEIGHT: 214.75 LBS | BODY MASS INDEX: 32.55 KG/M2 | HEART RATE: 115 BPM | OXYGEN SATURATION: 97 % | DIASTOLIC BLOOD PRESSURE: 84 MMHG | HEIGHT: 68 IN

## 2024-09-18 DIAGNOSIS — I50.22 CHRONIC SYSTOLIC HEART FAILURE: ICD-10-CM

## 2024-09-18 DIAGNOSIS — R30.0 DYSURIA: ICD-10-CM

## 2024-09-18 DIAGNOSIS — Q21.12 PFO (PATENT FORAMEN OVALE): ICD-10-CM

## 2024-09-18 DIAGNOSIS — I42.8 NICM (NONISCHEMIC CARDIOMYOPATHY): ICD-10-CM

## 2024-09-18 DIAGNOSIS — I25.10 CORONARY ARTERY DISEASE INVOLVING NATIVE CORONARY ARTERY OF NATIVE HEART WITHOUT ANGINA PECTORIS: ICD-10-CM

## 2024-09-18 DIAGNOSIS — I48.0 PAROXYSMAL ATRIAL FIBRILLATION: ICD-10-CM

## 2024-09-18 DIAGNOSIS — Z01.419 WELL FEMALE EXAM WITH ROUTINE GYNECOLOGICAL EXAM: Primary | ICD-10-CM

## 2024-09-18 LAB
BILIRUBIN, UA POC OHS: NEGATIVE
BLOOD, UA POC OHS: ABNORMAL
CLARITY, UA POC OHS: CLEAR
COLOR, UA POC OHS: YELLOW
CTP QC/QA: YES
FECAL OCCULT BLOOD, POC: NEGATIVE
GLUCOSE, UA POC OHS: NEGATIVE
KETONES, UA POC OHS: NEGATIVE
LEUKOCYTES, UA POC OHS: ABNORMAL
NITRITE, UA POC OHS: NEGATIVE
PH, UA POC OHS: 5.5
PROTEIN, UA POC OHS: ABNORMAL
SPECIFIC GRAVITY, UA POC OHS: >=1.03
UROBILINOGEN, UA POC OHS: 0.2

## 2024-09-18 PROCEDURE — 82270 OCCULT BLOOD FECES: CPT | Mod: ,,, | Performed by: INTERNAL MEDICINE

## 2024-09-18 PROCEDURE — 1160F RVW MEDS BY RX/DR IN RCRD: CPT | Mod: CPTII,S$GLB,, | Performed by: INTERNAL MEDICINE

## 2024-09-18 PROCEDURE — 3079F DIAST BP 80-89 MM HG: CPT | Mod: CPTII,S$GLB,, | Performed by: INTERNAL MEDICINE

## 2024-09-18 PROCEDURE — 3044F HG A1C LEVEL LT 7.0%: CPT | Mod: CPTII,S$GLB,, | Performed by: INTERNAL MEDICINE

## 2024-09-18 PROCEDURE — 1159F MED LIST DOCD IN RCRD: CPT | Mod: CPTII,S$GLB,, | Performed by: INTERNAL MEDICINE

## 2024-09-18 PROCEDURE — 87086 URINE CULTURE/COLONY COUNT: CPT | Performed by: INTERNAL MEDICINE

## 2024-09-18 PROCEDURE — 3008F BODY MASS INDEX DOCD: CPT | Mod: CPTII,S$GLB,, | Performed by: INTERNAL MEDICINE

## 2024-09-18 PROCEDURE — 99214 OFFICE O/P EST MOD 30 MIN: CPT | Mod: S$GLB,,, | Performed by: INTERNAL MEDICINE

## 2024-09-18 PROCEDURE — 3074F SYST BP LT 130 MM HG: CPT | Mod: CPTII,S$GLB,, | Performed by: INTERNAL MEDICINE

## 2024-09-18 PROCEDURE — G2211 COMPLEX E/M VISIT ADD ON: HCPCS | Mod: S$GLB,,, | Performed by: INTERNAL MEDICINE

## 2024-09-18 PROCEDURE — 81003 URINALYSIS AUTO W/O SCOPE: CPT | Mod: QW,S$GLB,, | Performed by: INTERNAL MEDICINE

## 2024-09-18 PROCEDURE — 4010F ACE/ARB THERAPY RXD/TAKEN: CPT | Mod: CPTII,S$GLB,, | Performed by: INTERNAL MEDICINE

## 2024-09-18 RX ORDER — CIPROFLOXACIN 500 MG/1
500 TABLET ORAL EVERY 12 HOURS
Qty: 14 TABLET | Refills: 0 | Status: SHIPPED | OUTPATIENT
Start: 2024-09-18 | End: 2024-09-25

## 2024-09-18 NOTE — PROGRESS NOTES
Subjective:       Patient ID: Tanya Espinoza is a 64 y.o. female.    Chief Complaint: Gynecologic Exam    Tanya Espinoza is a 64 y.o.  who presents for an annual exam. The patient has no complaints today.     She has CHF with reduced EF of 30% seen on echo in 3/2024.  She has a repeat echo on 2024 that showed PFO with fenestrated ASD with right to left shunt and EF 60%. She was seen by Dr. Miles's NP who advised to continue medications but Tanya stopped all her medication about a month ago for increasing fatigue and low energy. She is feeling better but has increasing weight and lower leg swelling. She is scheduled to see Dr. Ruvalcaba who is now her cardiologist in early October with repeat echo.     Last pap:   Pap results:  no abnormalities  Last mammogram: 2024  Mammogram results:   normal--routine follow-up in 12 months.  LMP: age late 40s    Concerns  Vaginal discharge or odor: No  Abnormal bleeding: No  Pelvic Pain:  No  Sexually active:  yes  Pain during intercourse:  No  Birth Control:  No  Dysuria:  Yes - started 2 days ago. Decreased UOP.  No blood in urine. No urgency or frequency. Started after having sex one week ago  Incontinence:  No    Breast lump/bump: No  Breast pain:  No  Nipple discharge:  No  Breast skin changes: No     Menopausal symptoms:  No  Concerns for domestic abuse: No      Review of Systems   Constitutional:  Negative for activity change, appetite change, chills, fatigue and fever.   HENT:  Negative for congestion, ear discharge, ear pain, mouth sores, postnasal drip, rhinorrhea, sinus pressure and sore throat.    Eyes:  Negative for pain, discharge and redness.   Respiratory:  Negative for cough, chest tightness, shortness of breath and wheezing.    Gastrointestinal:  Negative for abdominal pain, constipation, diarrhea, nausea and vomiting.   Genitourinary:  Positive for difficulty urinating and dysuria. Negative for frequency and hematuria.   Musculoskeletal:   "Negative for arthralgias and neck stiffness.   Skin:  Negative for rash.   Neurological:  Negative for headaches.   Hematological:  Negative for adenopathy.       Objective:      Vitals:    09/18/24 1131   BP: 112/84   BP Location: Right arm   Patient Position: Sitting   BP Method: Large (Manual)   Pulse: (!) 115   Resp: 17   Temp: 97.5 °F (36.4 °C)   SpO2: 97%   Weight: 97.4 kg (214 lb 11.7 oz)   Height: 5' 8" (1.727 m)     Physical Exam  General appearance: No acute distress, cooperative  Neck: FROM, soft, supple, no thyromegaly, no JVD  Lymph: no anterior or posterior cervical adenopathy, no axillary adenopathy  Heart::  Regular rate and rhythm, no murmur  Lung: Clear to ascultation bilaterally, no wheezing, no rales, no rhonchi, no distress  Breast: symmetric, no masses, no tenderness, no nipple discharge, nipples appear normal without inversion, no skin changes, no axillary adenopathy  Abdomen: Soft, nontender, no distention, no hepatosplenomegaly, bowel sounds normal, no guarding, no rebound, no peritoneal signs  Skin: no rashes, no lesions  :  Normal external female genitalia without lesions, normal vagina, cervix---no discharge, no lesions, no CMT, no adnexal masses or tenderness  Rectal : brown stool, good tone, negative for blood   Extremities: no edema  Peripheral pulses: 2+ pedal pulses bilaterally, good perfusion and color        Assessment:       1. Well female exam with routine gynecological exam    2. Dysuria    3. Coronary artery disease involving native coronary artery of native heart without angina pectoris    4. NICM (nonischemic cardiomyopathy)    5. Chronic systolic heart failure    6. Paroxysmal atrial fibrillation    7. PFO (patent foramen ovale)        Plan:       Well female exam with routine gynecological exam  Normal exam and will send for pap with HPV testing.   -     Liquid-Based Pap Smear, Screening  -     HPV High Risk Genotypes, PCR  -     POCT occult blood stool    Dysuria  U/a " concerning for infection. Will send culture and start treatment with cipro for 7 days.   -     POCT Urinalysis(Instrument)  -     CULTURE, URINE    Coronary artery disease involving native coronary artery of native heart without angina pectoris  Stable but she stopped all her medication. Scheduled to see cardiology in one week to address.     NICM (nonischemic cardiomyopathy) with Chronic systolic heart failure   Well compensated and echo on 7/2024 with EF recover. She is to discuss medications with cardiology    PFO (patent foramen ovale)  Noted on echo from [facility] on 6/2024    Paroxysmal atrial fibrillation  NSR today on exam but she is not on anticoagulation. To discuss with cardiology.    Follow up in about 2 months (around 11/18/2024) for chronic medical issues, already scheduled.

## 2024-09-19 LAB
BACTERIA UR CULT: NORMAL
BACTERIA UR CULT: NORMAL

## 2024-09-20 ENCOUNTER — PATIENT MESSAGE (OUTPATIENT)
Dept: FAMILY MEDICINE | Facility: CLINIC | Age: 64
End: 2024-09-20
Payer: COMMERCIAL

## 2024-09-20 ENCOUNTER — HOSPITAL ENCOUNTER (OUTPATIENT)
Dept: CARDIOLOGY | Facility: HOSPITAL | Age: 64
Discharge: HOME | End: 2024-09-20
Attending: INTERNAL MEDICINE
Payer: COMMERCIAL

## 2024-09-20 VITALS — HEIGHT: 68 IN | WEIGHT: 214 LBS | BODY MASS INDEX: 32.43 KG/M2

## 2024-09-20 DIAGNOSIS — J18.9 PNEUMONIA DUE TO INFECTIOUS ORGANISM, UNSPECIFIED LATERALITY, UNSPECIFIED PART OF LUNG: ICD-10-CM

## 2024-09-20 DIAGNOSIS — R07.1 CHEST PAIN ON BREATHING: ICD-10-CM

## 2024-09-20 DIAGNOSIS — I70.90 ATHEROSCLEROSIS: ICD-10-CM

## 2024-09-20 DIAGNOSIS — R00.1 BRADYCARDIA: ICD-10-CM

## 2024-09-20 DIAGNOSIS — R94.39 ABNORMAL NUCLEAR STRESS TEST: ICD-10-CM

## 2024-09-20 DIAGNOSIS — J98.11 ATELECTASIS: ICD-10-CM

## 2024-09-20 LAB
ASCENDING AORTA: 2.8 CM
AV INDEX (PROSTH): 1.03
AV MEAN GRADIENT: 3 MMHG
AV PEAK GRADIENT: 5 MMHG
AV VALVE AREA BY VELOCITY RATIO: 2.71 CM²
AV VALVE AREA: 3.13 CM²
AV VELOCITY RATIO: 0.89
BSA FOR ECHO PROCEDURE: 2.16 M2
CV ECHO LV RWT: 0.42 CM
DOP CALC AO PEAK VEL: 1.16 M/S
DOP CALC AO VTI: 21.8 CM
DOP CALC LVOT AREA: 3 CM2
DOP CALC LVOT DIAMETER: 1.97 CM
DOP CALC LVOT PEAK VEL: 1.03 M/S
DOP CALC LVOT STROKE VOLUME: 68.24 CM3
DOP CALC RVOT AREA: 5.18 CM2
DOP CALC RVOT DIAMETER: 2.57 CM
DOP CALCLVOT PEAK VEL VTI: 22.4 CM
E WAVE DECELERATION TIME: 216.26 MSEC
E/A RATIO: 0.6
E/E' RATIO: 6.62 M/S
ECHO LV POSTERIOR WALL: 0.92 CM (ref 0.6–1.1)
FRACTIONAL SHORTENING: 21 % (ref 28–44)
INTERVENTRICULAR SEPTUM: 0.92 CM (ref 0.6–1.1)
LEFT ATRIUM AREA SYSTOLIC (APICAL 2 CHAMBER): 16.98 CM2
LEFT ATRIUM AREA SYSTOLIC (APICAL 4 CHAMBER): 19.84 CM2
LEFT ATRIUM SIZE: 3.93 CM
LEFT ATRIUM VOLUME INDEX MOD: 25.4 ML/M2
LEFT ATRIUM VOLUME MOD: 53.37 CM3
LEFT INTERNAL DIMENSION IN SYSTOLE: 3.48 CM (ref 2.1–4)
LEFT VENTRICLE DIASTOLIC VOLUME INDEX: 42.46 ML/M2
LEFT VENTRICLE DIASTOLIC VOLUME: 89.17 ML
LEFT VENTRICLE END SYSTOLIC VOLUME APICAL 2 CHAMBER: 46.49 ML
LEFT VENTRICLE END SYSTOLIC VOLUME APICAL 4 CHAMBER: 56.75 ML
LEFT VENTRICLE MASS INDEX: 63 G/M2
LEFT VENTRICLE SYSTOLIC VOLUME INDEX: 23.8 ML/M2
LEFT VENTRICLE SYSTOLIC VOLUME: 50.08 ML
LEFT VENTRICULAR INTERNAL DIMENSION IN DIASTOLE: 4.43 CM (ref 3.5–6)
LEFT VENTRICULAR MASS: 133.35 G
LV LATERAL E/E' RATIO: 6.14 M/S
LV SEPTAL E/E' RATIO: 7.17 M/S
LVED V (TEICH): 89.17 ML
LVES V (TEICH): 50.08 ML
LVOT MG: 2.56 MMHG
LVOT MV: 0.76 CM/S
MV PEAK A VEL: 0.72 M/S
MV PEAK E VEL: 0.43 M/S
MV STENOSIS PRESSURE HALF TIME: 62.72 MS
MV VALVE AREA P 1/2 METHOD: 3.51 CM2
OHS CV RV/LV RATIO: 0.73 CM
PISA TR MAX VEL: 2.16 M/S
PULM VEIN S/D RATIO: 1.65
PV PEAK D VEL: 0.23 M/S
PV PEAK S VEL: 0.38 M/S
RA PRESSURE ESTIMATED: 3 MMHG
RA VOL SYS: 19.55 ML
RIGHT ATRIAL AREA: 10 CM2
RIGHT ATRIUM VOLUME AREA LENGTH APICAL 4 CHAMBER: 19.35 ML
RIGHT VENTRICLE DIASTOLIC LENGTH: 6.4 CM
RIGHT VENTRICLE DIASTOLIC MID DIMENSION: 2.5 CM
RIGHT VENTRICULAR END-DIASTOLIC DIMENSION: 3.24 CM
RIGHT VENTRICULAR LENGTH IN DIASTOLE (APICAL 4-CHAMBER VIEW): 6.37 CM
RV MID DIAMA: 2.46 CM
RV TB RVSP: 5 MMHG
RV TISSUE DOPPLER FREE WALL SYSTOLIC VELOCITY 1 (APICAL 4 CHAMBER VIEW): 9.7 CM/S
SINUS: 2.84 CM
STJ: 2.71 CM
TDI LATERAL: 0.07 M/S
TDI SEPTAL: 0.06 M/S
TDI: 0.07 M/S
TR MAX PG: 19 MMHG
TRICUSPID ANNULAR PLANE SYSTOLIC EXCURSION: 1.84 CM
TV REST PULMONARY ARTERY PRESSURE: 22 MMHG
Z-SCORE OF LEFT VENTRICULAR DIMENSION IN END DIASTOLE: -3.88
Z-SCORE OF LEFT VENTRICULAR DIMENSION IN END SYSTOLE: -1.08

## 2024-09-20 PROCEDURE — 93306 TTE W/DOPPLER COMPLETE: CPT | Mod: PO

## 2024-09-20 PROCEDURE — 93306 TTE W/DOPPLER COMPLETE: CPT | Mod: 26,,, | Performed by: INTERNAL MEDICINE

## 2024-10-02 ENCOUNTER — OFFICE VISIT (OUTPATIENT)
Dept: CARDIOLOGY | Facility: CLINIC | Age: 64
End: 2024-10-02
Payer: COMMERCIAL

## 2024-10-02 VITALS
SYSTOLIC BLOOD PRESSURE: 138 MMHG | HEIGHT: 68 IN | HEART RATE: 90 BPM | WEIGHT: 217.38 LBS | BODY MASS INDEX: 32.94 KG/M2 | DIASTOLIC BLOOD PRESSURE: 80 MMHG

## 2024-10-02 DIAGNOSIS — I50.22 CHRONIC SYSTOLIC CONGESTIVE HEART FAILURE: ICD-10-CM

## 2024-10-02 DIAGNOSIS — R94.39 ABNORMAL NUCLEAR STRESS TEST: Primary | ICD-10-CM

## 2024-10-02 DIAGNOSIS — I51.9 LV DYSFUNCTION: ICD-10-CM

## 2024-10-02 DIAGNOSIS — R07.89 CHEST HEAVINESS: ICD-10-CM

## 2024-10-02 DIAGNOSIS — Z72.0 TOBACCO ABUSE: ICD-10-CM

## 2024-10-02 DIAGNOSIS — I70.90 ATHEROSCLEROSIS: ICD-10-CM

## 2024-10-02 DIAGNOSIS — I44.7 LBBB (LEFT BUNDLE BRANCH BLOCK): ICD-10-CM

## 2024-10-02 DIAGNOSIS — R07.1 CHEST PAIN ON BREATHING: ICD-10-CM

## 2024-10-02 PROCEDURE — 3079F DIAST BP 80-89 MM HG: CPT | Mod: CPTII,S$GLB,, | Performed by: INTERNAL MEDICINE

## 2024-10-02 PROCEDURE — 3044F HG A1C LEVEL LT 7.0%: CPT | Mod: CPTII,S$GLB,, | Performed by: INTERNAL MEDICINE

## 2024-10-02 PROCEDURE — 4010F ACE/ARB THERAPY RXD/TAKEN: CPT | Mod: CPTII,S$GLB,, | Performed by: INTERNAL MEDICINE

## 2024-10-02 PROCEDURE — 1159F MED LIST DOCD IN RCRD: CPT | Mod: CPTII,S$GLB,, | Performed by: INTERNAL MEDICINE

## 2024-10-02 PROCEDURE — 3075F SYST BP GE 130 - 139MM HG: CPT | Mod: CPTII,S$GLB,, | Performed by: INTERNAL MEDICINE

## 2024-10-02 PROCEDURE — 99999 PR PBB SHADOW E&M-EST. PATIENT-LVL III: CPT | Mod: PBBFAC,,, | Performed by: INTERNAL MEDICINE

## 2024-10-02 PROCEDURE — 3008F BODY MASS INDEX DOCD: CPT | Mod: CPTII,S$GLB,, | Performed by: INTERNAL MEDICINE

## 2024-10-02 PROCEDURE — 99215 OFFICE O/P EST HI 40 MIN: CPT | Mod: S$GLB,,, | Performed by: INTERNAL MEDICINE

## 2024-10-02 PROCEDURE — 1160F RVW MEDS BY RX/DR IN RCRD: CPT | Mod: CPTII,S$GLB,, | Performed by: INTERNAL MEDICINE

## 2024-10-04 ENCOUNTER — OFFICE VISIT (OUTPATIENT)
Dept: FAMILY MEDICINE | Facility: CLINIC | Age: 64
End: 2024-10-04
Payer: COMMERCIAL

## 2024-10-04 VITALS
HEIGHT: 68 IN | TEMPERATURE: 98 F | WEIGHT: 217.5 LBS | SYSTOLIC BLOOD PRESSURE: 130 MMHG | HEART RATE: 77 BPM | DIASTOLIC BLOOD PRESSURE: 72 MMHG | OXYGEN SATURATION: 97 % | BODY MASS INDEX: 32.96 KG/M2

## 2024-10-04 DIAGNOSIS — N30.01 ACUTE CYSTITIS WITH HEMATURIA: ICD-10-CM

## 2024-10-04 DIAGNOSIS — R31.9 HEMATURIA, UNSPECIFIED TYPE: ICD-10-CM

## 2024-10-04 DIAGNOSIS — R35.0 URINARY FREQUENCY: Primary | ICD-10-CM

## 2024-10-04 LAB
BILIRUBIN, UA POC OHS: ABNORMAL
BLOOD, UA POC OHS: NEGATIVE
CLARITY, UA POC OHS: CLEAR
COLOR, UA POC OHS: YELLOW
GLUCOSE, UA POC OHS: NEGATIVE
KETONES, UA POC OHS: NEGATIVE
LEUKOCYTES, UA POC OHS: ABNORMAL
NITRITE, UA POC OHS: NEGATIVE
PH, UA POC OHS: 5.5
PROTEIN, UA POC OHS: ABNORMAL
SPECIFIC GRAVITY, UA POC OHS: >=1.03
UROBILINOGEN, UA POC OHS: 0.2

## 2024-10-04 PROCEDURE — 87088 URINE BACTERIA CULTURE: CPT | Performed by: NURSE PRACTITIONER

## 2024-10-04 PROCEDURE — 3075F SYST BP GE 130 - 139MM HG: CPT | Mod: CPTII,S$GLB,, | Performed by: NURSE PRACTITIONER

## 2024-10-04 PROCEDURE — 87086 URINE CULTURE/COLONY COUNT: CPT | Performed by: NURSE PRACTITIONER

## 2024-10-04 PROCEDURE — 3044F HG A1C LEVEL LT 7.0%: CPT | Mod: CPTII,S$GLB,, | Performed by: NURSE PRACTITIONER

## 2024-10-04 PROCEDURE — 99213 OFFICE O/P EST LOW 20 MIN: CPT | Mod: S$GLB,,, | Performed by: NURSE PRACTITIONER

## 2024-10-04 PROCEDURE — 3008F BODY MASS INDEX DOCD: CPT | Mod: CPTII,S$GLB,, | Performed by: NURSE PRACTITIONER

## 2024-10-04 PROCEDURE — 3078F DIAST BP <80 MM HG: CPT | Mod: CPTII,S$GLB,, | Performed by: NURSE PRACTITIONER

## 2024-10-04 PROCEDURE — 81003 URINALYSIS AUTO W/O SCOPE: CPT | Mod: QW,S$GLB,, | Performed by: NURSE PRACTITIONER

## 2024-10-04 PROCEDURE — 4010F ACE/ARB THERAPY RXD/TAKEN: CPT | Mod: CPTII,S$GLB,, | Performed by: NURSE PRACTITIONER

## 2024-10-04 RX ORDER — SULFAMETHOXAZOLE AND TRIMETHOPRIM 800; 160 MG/1; MG/1
1 TABLET ORAL 2 TIMES DAILY
Qty: 8 TABLET | Refills: 0 | Status: SHIPPED | OUTPATIENT
Start: 2024-10-04 | End: 2024-10-07 | Stop reason: ALTCHOICE

## 2024-10-04 NOTE — PROGRESS NOTES
Subjective:       Patient ID: Tanya Espinoza is a 64 y.o. female.    Chief Complaint: Urinary Tract Infection    Urinary Tract Infection   Associated symptoms include frequency and hematuria. Pertinent negatives include no nausea or vomiting.     Here with concerns for UTI. States she felt light headed this morning when she urinated. Onset the past 3 days. Has trouble passing her urine. She has visible hematuria at times. Bladder pressure. Feels she is hydrating well. CT done in July 2024 was unremarkable. Denies fever. See ROS.     The following portion of the patients history was reviewed and updated as appropriate: allergies, current medications, past medical and surgical history. Past social history and problem list reviewed. Family PMH and Past social history reviewed. Tobacco, Illicit drug use reviewed.      Review of patient's allergies indicates:   Allergen Reactions    Adhesive Rash    Effexor [venlafaxine] Other (See Comments)     Bruising all over body and elevated blood pressure         Current Outpatient Medications:     atorvastatin (LIPITOR) 20 MG tablet, Take 1 tablet (20 mg total) by mouth every evening., Disp: 30 tablet, Rfl: 3    multivitamin with minerals tablet, Take 1 tablet by mouth once daily., Disp: , Rfl:     sacubitriL-valsartan (ENTRESTO) 24-26 mg per tablet, Take 1 tablet by mouth 2 (two) times daily., Disp: 60 tablet, Rfl: 1    digoxin (LANOXIN) 125 mcg tablet, Take 1 tablet (125 mcg total) by mouth once daily. (Patient not taking: Reported on 8/20/2024), Disp: 30 tablet, Rfl: 11    lemborexant 10 mg Tab, Take 10 mg by mouth nightly. (Patient not taking: Reported on 8/20/2024), Disp: 30 tablet, Rfl: 2    metoprolol succinate (TOPROL-XL) 25 MG 24 hr tablet, Take 0.5 tablets (12.5 mg total) by mouth once daily. (Patient not taking: Reported on 8/20/2024), Disp: 15 tablet, Rfl: 3    Past Medical History:   Diagnosis Date    ADHD (attention deficit hyperactivity disorder)     Anemia      Anticoagulant long-term use     Anxiety     CHF (congestive heart failure)     Coronary artery disease     CAD    Elevated LFTs     elevated enzymes    GERD (gastroesophageal reflux disease) 2021    Headache     Hepatomegaly     Hyperlipemia 2021    Hypertension     LBBB (left bundle branch block)     Lower extremity edema     Major depressive disorder, recurrent episode, mild 2022    Obesity     Paroxysmal A-fib     Skin cancer     skin ca       Past Surgical History:   Procedure Laterality Date    ABLATION  2021    to heart for a-fib x 2    COLONOSCOPY  2024    CORONARY ANGIOGRAPHY N/A 2021    Procedure: ANGIOGRAM, CORONARY ARTERY;  Surgeon: Jesus Sol MD;  Location: [facility];  Service: Cardiology;  Laterality: N/A;    LEFT HEART CATHETERIZATION N/A 2021    Procedure: Left heart cath;  Surgeon: Jesus Sol MD;  Location: [facility];  Service: Cardiology;  Laterality: N/A;    LEFT HEART CATHETERIZATION  2024    Procedure: Left heart cath;  Surgeon: Jorge Hinkle MD;  Location: [facility];  Service: Cardiology;;    TONSILLECTOMY      TUBAL LIGATION         Social History     Socioeconomic History    Marital status:    Occupational History    Occupation: [email or web address] and [email or web address]   Tobacco Use    Smoking status: Former     Current packs/day: 0.00     Average packs/day: 1 pack/day for 40.0 years (40.0 ttl pk-yrs)     Types: Cigarettes     Quit date: 2023     Years since quittin.3    Smokeless tobacco: Never    Tobacco comments:     down to 0.25 pack of  cigarettes per day   Substance and Sexual Activity    Alcohol use: Not Currently    Drug use: No     Social Drivers of Health     Financial Resource Strain: Low Risk  (8/15/2024)    Overall Financial Resource Strain (CARDIA)     Difficulty of Paying Living Expenses: Not very hard   Food Insecurity: No Food Insecurity (8/15/2024)    Hunger Vital Sign     Worried About Running Out of  "Food in the Last Year: Never true     Ran Out of Food in the Last Year: Never true   Transportation Needs: No Transportation Needs (3/18/2024)    PRAPARE - Transportation     Lack of Transportation (Medical): No     Lack of Transportation (Non-Medical): No   Physical Activity: Unknown (8/15/2024)    Exercise Vital Sign     Days of Exercise per Week: 0 days   Stress: Stress Concern Present (8/15/2024)    [employer] [place] - Occupational Stress Questionnaire     Feeling of Stress : Very much   Housing Stability: Unknown (8/15/2024)    Housing Stability Vital Sign     Unable to Pay for Housing in the Last Year: Patient declined     Review of Systems   Constitutional:  Negative for fatigue and fever.   HENT: Negative.     Eyes:  Negative for visual disturbance.   Respiratory:  Negative for cough, chest tightness, shortness of breath and wheezing.    Cardiovascular:  Negative for chest pain, palpitations and leg swelling.   Gastrointestinal:  Negative for abdominal pain, blood in stool, diarrhea, nausea and vomiting.   Genitourinary:  Positive for dysuria, frequency and hematuria. Negative for pelvic pain.   Musculoskeletal:  Negative for arthralgias, back pain and gait problem.   Skin: Negative.    Neurological:  Negative for headaches.   Psychiatric/Behavioral:  Negative for dysphoric mood and sleep disturbance. The patient is not nervous/anxious.        Objective:      /72 (BP Location: Left arm, Patient Position: Sitting)   Pulse 77   Temp 98.2 °F (36.8 °C) (Temporal)   Ht 5' 8" (1.727 m)   Wt 98.6 kg (217 lb 7.7 oz)   LMP 10/28/2016 (Approximate)   SpO2 97%   BMI 33.07 kg/m²      Physical Exam  Constitutional:       Appearance: Normal appearance. She is obese.   HENT:      Head: Normocephalic.   Eyes:      Pupils: Pupils are equal, round, and reactive to light.   Neck:      Thyroid: No thyromegaly.      Vascular: No carotid bruit.   Cardiovascular:      Rate and Rhythm: Normal " rate and regular rhythm.      Pulses: Normal pulses.      Heart sounds: Normal heart sounds. No murmur heard.  Pulmonary:      Effort: Pulmonary effort is normal.      Breath sounds: Normal breath sounds. No wheezing.   Abdominal:      General: Bowel sounds are normal.      Tenderness: There is no abdominal tenderness.   Musculoskeletal:         General: Normal range of motion.      Cervical back: Normal range of motion.      Right lower leg: No edema.      Left lower leg: No edema.      Comments: Gait normal.  strong, equal   Skin:     General: Skin is warm and dry.      Capillary Refill: Capillary refill takes less than 2 seconds.   Neurological:      General: No focal deficit present.      Mental Status: She is alert.   Psychiatric:         Attention and Perception: Attention and perception normal.         Mood and Affect: Mood and affect normal.         Speech: Speech normal.         Behavior: Behavior normal.         Assessment:       1. Urinary frequency    2. Hematuria, unspecified type    3. Acute cystitis with hematuria        Plan:       Urinary frequency:   Results for orders placed or performed in visit on 10/04/24   Urine culture    Collection Time: 10/04/24 11:56 AM    Specimen: Urine, Clean Catch   Result Value Ref Range    Urine Culture, Routine (A)      DIPHTHEROIDS  10,000 - 49,999 cfu/ml  No other significant isolate     POCT Urinalysis(Instrument)    Collection Time: 10/04/24 12:00 PM   Result Value Ref Range    Color, POC UA Yellow Yellow, Straw, Colorless    Clarity, POC UA Clear Clear    Glucose, POC UA Negative Negative    Bilirubin, POC UA Small (A) Negative    Ketones, POC UA Negative Negative    Spec Grav POC UA >=1.030 1.005 - 1.030    Blood, POC UA Negative Negative    pH, POC UA 5.5 5.0 - 8.0    Protein, POC UA Trace (A) Negative    Urobilinogen, POC UA 0.2 <=1.0    Nitrite, POC UA Negative Negative    WBC, POC UA Trace (A) Negative      -     POCT  Urinalysis(Instrument)    Hematuria, unspecified type: no blood noted in UA today. Refer to Urology  -     Ambulatory referral/consult to Urology; Future; Expected date: 10/11/2024    Acute cystitis with hematuria  -     Ambulatory referral/consult to Urology; Future; Expected date: 10/11/2024  -     Urine culture    Other orders  -     sulfamethoxazole-trimethoprim 800-160mg (BACTRIM DS) 800-160 mg Tab; Take 1 tablet by mouth 2 (two) times daily.  Dispense: 8 tablet; Refill: 0       Continue current medication  Take medications only as prescribed  Healthy diet, exercise  Adequate rest  Adequate hydration  Avoid allergens  Avoid excessive caffeine     Follow up if not improving

## 2024-10-05 LAB — BACTERIA UR CULT: ABNORMAL

## 2024-10-07 ENCOUNTER — PATIENT MESSAGE (OUTPATIENT)
Dept: FAMILY MEDICINE | Facility: CLINIC | Age: 64
End: 2024-10-07
Payer: COMMERCIAL

## 2024-10-07 ENCOUNTER — OFFICE VISIT (OUTPATIENT)
Dept: UROLOGY | Facility: CLINIC | Age: 64
End: 2024-10-07
Payer: COMMERCIAL

## 2024-10-07 VITALS — HEIGHT: 68 IN | BODY MASS INDEX: 32.84 KG/M2 | WEIGHT: 216.69 LBS

## 2024-10-07 DIAGNOSIS — N39.0 RECURRENT UTI: ICD-10-CM

## 2024-10-07 DIAGNOSIS — R31.9 HEMATURIA, UNSPECIFIED TYPE: ICD-10-CM

## 2024-10-07 LAB
BILIRUBIN, UA POC OHS: ABNORMAL
BLOOD, UA POC OHS: NEGATIVE
CLARITY, UA POC OHS: CLEAR
COLOR, UA POC OHS: YELLOW
GLUCOSE, UA POC OHS: NEGATIVE
KETONES, UA POC OHS: NEGATIVE
LEUKOCYTES, UA POC OHS: ABNORMAL
NITRITE, UA POC OHS: NEGATIVE
PH, UA POC OHS: 5.5
PROTEIN, UA POC OHS: NEGATIVE
SPECIFIC GRAVITY, UA POC OHS: >=1.03
UROBILINOGEN, UA POC OHS: 0.2

## 2024-10-07 PROCEDURE — 81003 URINALYSIS AUTO W/O SCOPE: CPT | Mod: QW,S$GLB,,

## 2024-10-07 PROCEDURE — 1159F MED LIST DOCD IN RCRD: CPT | Mod: CPTII,S$GLB,,

## 2024-10-07 PROCEDURE — 4010F ACE/ARB THERAPY RXD/TAKEN: CPT | Mod: CPTII,S$GLB,,

## 2024-10-07 PROCEDURE — 3044F HG A1C LEVEL LT 7.0%: CPT | Mod: CPTII,S$GLB,,

## 2024-10-07 PROCEDURE — 3008F BODY MASS INDEX DOCD: CPT | Mod: CPTII,S$GLB,,

## 2024-10-07 PROCEDURE — 99999 PR PBB SHADOW E&M-EST. PATIENT-LVL III: CPT | Mod: PBBFAC,,,

## 2024-10-07 PROCEDURE — 1160F RVW MEDS BY RX/DR IN RCRD: CPT | Mod: CPTII,S$GLB,,

## 2024-10-07 PROCEDURE — 99203 OFFICE O/P NEW LOW 30 MIN: CPT | Mod: S$GLB,,,

## 2024-10-07 RX ORDER — ESTRADIOL 0.1 MG/G
CREAM VAGINAL
Qty: 42.5 G | Refills: 3 | Status: SHIPPED | OUTPATIENT
Start: 2024-10-07

## 2024-10-07 RX ORDER — NITROFURANTOIN 25; 75 MG/1; MG/1
100 CAPSULE ORAL 2 TIMES DAILY
Qty: 14 CAPSULE | Refills: 0 | Status: SHIPPED | OUTPATIENT
Start: 2024-10-07

## 2024-10-07 NOTE — TELEPHONE ENCOUNTER
Her urine culture grew Diptheroids.  Is she still having symptoms?  If so then need to change the antibiotic she was given to something different. If her symptoms have resolved then will monitor.  Diptheroids in the urine are usually not treated unless symptoms are present.

## 2024-10-07 NOTE — TELEPHONE ENCOUNTER
Read Bety's result note to pt.  Pt states she is still having burning on urination and pressure.  She is asking you call abx to [facility] in [facility].

## 2024-10-07 NOTE — PROGRESS NOTES
[facility] Note  Staff: Dionne Quiroga FNP-C    PCP: DO Michael    Chief Complaint: Recurrent UTIs    Subjective:        HPI: Tanya Espinoza is a 64 y.o. female NEW PATIENT presents today for evaluation of recurrent UTIs. She states her symptoms of a UTI include hesitancy, lower abd pain, and vaginal burning. She states at times she will see blood in her urine. Her most recent urine culture is from 10/4/2024 positive for low count diphtheroids.     Urine culture review:   7/8/2024 10,000-49,000  klebsiella  7/30/2024 mult orgs  8/20/2024 10,000-49,000 group B strep  9/18/2024 mult orgs    She has abx called in to her pharmacy from PCP today for macrobid.    We discussed the use of estrogen vaginal cream as a preventative. We also discussed the use of D-Mannose. She denies a history of kidney stones. She does suffer with chronic constipation.     Questions asked pt during office visit today:  Urgency:No, incontinence with urgency? No;   DysuriaNo  Gross HematuriaNo    History of Kidney Stones?:  No    Constipation issues?:  Yes    REVIEW OF SYSTEMS:  Review of Systems   Constitutional: Negative.  Negative for chills and fever.   Gastrointestinal:  Positive for constipation. Negative for abdominal pain, diarrhea, nausea and vomiting.   Genitourinary: Negative.  Negative for dysuria, flank pain, frequency, hematuria and urgency.   Musculoskeletal: Negative.  Negative for back pain.       PMHx:  Past Medical History:   Diagnosis Date    ADHD (attention deficit hyperactivity disorder)     Anemia     Anticoagulant long-term use     Anxiety     CHF (congestive heart failure)     Coronary artery disease     CAD    Elevated LFTs     elevated enzymes    GERD (gastroesophageal reflux disease) 04/24/2021    Headache     Hepatomegaly     Hyperlipemia 04/24/2021    Hypertension     LBBB (left bundle branch block)     Lower extremity edema     Major depressive disorder, recurrent episode, mild 01/03/2022     Obesity     Paroxysmal A-fib     Skin cancer     skin ca       PSHx:  Past Surgical History:   Procedure Laterality Date    ABLATION  04/25/2021    to heart for a-fib x 2    COLONOSCOPY  08/07/2024    CORONARY ANGIOGRAPHY N/A 05/06/2021    Procedure: ANGIOGRAM, CORONARY ARTERY;  Surgeon: Jesus Sol MD;  Location: [facility];  Service: Cardiology;  Laterality: N/A;    LEFT HEART CATHETERIZATION N/A 05/06/2021    Procedure: Left heart cath;  Surgeon: Jesus Sol MD;  Location: [facility];  Service: Cardiology;  Laterality: N/A;    LEFT HEART CATHETERIZATION  03/21/2024    Procedure: Left heart cath;  Surgeon: Jorge Hinkle MD;  Location: [facility];  Service: Cardiology;;    TONSILLECTOMY      TUBAL LIGATION         Fam Hx:   malignancies: No , gyn malignancies: No   kidney stones: No     Soc Hx:  , lives in [place]    Allergies:  Adhesive and Effexor [venlafaxine]    Medications: reviewed     Objective:   There were no vitals filed for this visit.    Physical Exam  Constitutional:       Appearance: Normal appearance.   Pulmonary:      Effort: Pulmonary effort is normal.   Abdominal:      General: There is no distension.      Palpations: Abdomen is soft.      Tenderness: There is no abdominal tenderness.   Musculoskeletal:         General: Normal range of motion.      Cervical back: Normal range of motion.   Skin:     General: Skin is warm.   Neurological:      Mental Status: She is alert and oriented to person, place, and time.   Psychiatric:         Mood and Affect: Mood normal.         Behavior: Behavior normal.         LABS REVIEW:  UA today:   Color:Clear, Yellow  Spec. Grav.  >1.030  PH  5.5  Negative for nitrates, protein, glucose, ketones, urobili, and blood.  Small bili  Trace leuks    Assessment:       1. Hematuria, unspecified type    2. Recurrent UTI          Plan:      Estrace vaginal cream prescribed- place 1 fingertip of cream first at urethral opening and then external  vagina every other day  Recommend D-Mannose supplementation    For your recurrent UTIs:  Behavioral changes to help decrease UTI recurrences   -increase water intake (6 to 8 bottles water per day)   -don't hold urine, void every 2 to 3 hours   -prevent constipation (miralax capful daily if necessary) to have a bowel movement daily and keep stools soft  -cut down on caffeine,drink mainly water  -no douching   -void immediately after sexual intercourse  -wipe front to back     OTC meds to try (go to the pharmacist and ask where they are, they are over the counter)  -cranberry pills 500mg tabs TID, can buy over the counter  -take a probiotic daily designed for vaginal and urinary health  -D-Mannose once daily over the counter    IMPORTANT: If you feel like you have a UTI coming on, call our office and talk to one of the nurses. We will have you drop off a urine here in clinic or at one of our [facility] facilities. I do not typically like to write for antibiotics unless we have a urine culture. Avoid going to urgent care. We need to start documenting your urine cultures here in our office to see if they are really recurrent UTIs or sx of UTIs.     If you have fever and it doesn't improve with tylenol or ibuprofen or if you have flank pain associated with the uti symptoms go to the ER.     If you do continue to have positive urine cultures then we may proceed with doing a cystoscopy (to look in your bladder) and an imaging study    F/u as needed    [facility]: Active    TAE [place]

## 2024-10-07 NOTE — PATIENT INSTRUCTIONS
Thank you very much for reaching out with questions about your experience with recurrent urinary tract infections. We want to make sure that all of our patients have all of the information they need to properly deal with such a troublesome problem. Hopefully, this message will be helpful to you.     Urinary tract infections (UTI) are considered recurrent when they happen 3 (or more) times in a year or 2 (or more) times in 6 months. It's important to remember that infections should be associated with symptoms such as burning, pain, urinary frequency or new urinary incontinence. Each infection should also be confirmed with a urine culture. Cloudy urine or strong-smelling urine alone without other accompanying symptoms is generally not considered an indication of an infection.     Some patients with recurrent UTI will require an more extensive evaluation if there is a concern for an underlying abnormality. There are many reasons why this might be suspected but a previous urologic surgery or not emptying your bladder completely are the most common ones.  Additional studies might include a CT or ultrasound of the kidney. Some patients may need to have the bladder checked with a small scope. Much of the time, such testing is not needed.     Perhaps 10-20% of people may be found to have an abnormality of the urinary tract which explains their infections. But, for most people, there is no obvious problem found to clearly explain the infections. It may be that some people are naturally more likely than others to develop a UTI. For some, changes that occur with age, including menopause, may contribute to infections. It is very likely that recent use of antibiotics considerably increases the risk of another UTI.      Once a Urology evaluation is complete, you do not need to continue to follow in the Urology Department unless we have requested that you return. You do not need to return to the Urology Department to have  treatment for future infections.  We can provide you with advice on ways to reduce your risk of further infections. This might include over-the-counter medications or even prescriptions. Unfortunately, for some patients, it is not possible to eliminate their increased risk of infection and you may continue to experience frequent infections.     If you have symptoms of a urinary tract infection, an urgent care center is often the quickest place to be evaluated and treated for a urinary tract infection. An appointment with your primary care provider is an alternative if an urgent care center is not available. Unfortunately, the Urology Department is not an ideal location to have a suspected UTI evaluated and most of the time you will be asked to be evaluated at an urgent care center if you reach out to us about a potential infection. We do not recommend beginning antibiotic therapy for a possible UTI without checking a urine culture first.     If we found an abnormality at the time of your evaluation (such as a stone or incomplete emptying), we will work to correct the surgical problem. If you either did not need an extensive evaluation (U/S, CT, cystoscopy, etc) or have completed a normal evaluation (this may be 80-90%), several measures may help to reduce the risk of future infections:     Cranberry Juice and Extracts - Chemicals found in cranberry may bind to infection-causing bacteria and prevent them from adhering to the bladder. It is reasonable for patients worried about infections to use this as a preventative with important limitations. Cranberry juice can be high in sugar which may cause problems in diabetics. Cranberry cocktails that contain only a portion of cranberry juice, often do not contain enough PAC to have the intended effect. There are studies showing effectiveness of cranberry extract but it is difficult to know the amount of the active chemicals in cranberry pills.      D-mannose - D-mannose  acts in much the same way as cranberry. D-mannose is well-tolerated and has been shown in several studies to have a significant impact on reducing risk of recurrent infection after completion of an antibiotic. Like cranberry, this should be taken twice daily for effect. I usually recommend that you take 1000 mg of D-mannose twice daily. The brand does not matter.     Probiotics - The impact of probiotics is unclear, largely due to the variety of available strains. There is more evidence supporting the use of the specific strains Lactobacillus rhamnosus GR-1 and Lactobacillus reuteri RC-14.  [employer] and [employer] both make probiotics designed for urinary tract health that contain these bacteria.     Estrogen  -  Vaginal estrogen therapy, which replaces the effects of estrogen on the vaginal tissues, reduces the risk of recurrent UTI in menopausal women. Women who are near or past menopause and experience recurrent UTI should consider using vaginal estrogen therapy to reduce their risk of infections. Given the risks for some patients, this should be discussed with your gynecologist.      I hope that this has provided some insight as well as next steps that you can take to reduce the risk of urinary tract infections. If we can be of further help, please do not hesitate to make an office appointment so that we can further discuss your needs.     Kindest regards,  [facility]

## 2024-10-18 ENCOUNTER — HOSPITAL ENCOUNTER (OUTPATIENT)
Dept: CARDIOLOGY | Facility: HOSPITAL | Age: 64
Discharge: HOME | End: 2024-10-18
Attending: INTERNAL MEDICINE
Payer: COMMERCIAL

## 2024-10-18 VITALS — HEIGHT: 68 IN | WEIGHT: 210 LBS | BODY MASS INDEX: 31.83 KG/M2

## 2024-10-18 DIAGNOSIS — I70.90 ATHEROSCLEROSIS: ICD-10-CM

## 2024-10-18 DIAGNOSIS — R07.89 CHEST HEAVINESS: ICD-10-CM

## 2024-10-18 DIAGNOSIS — R94.39 ABNORMAL NUCLEAR STRESS TEST: ICD-10-CM

## 2024-10-18 LAB
ASCENDING AORTA: 2.89 CM
AV INDEX (PROSTH): 0.73
AV MEAN GRADIENT: 5.3 MMHG
AV PEAK GRADIENT: 9 MMHG
AV VALVE AREA BY VELOCITY RATIO: 1.9 CM²
AV VALVE AREA: 2.3 CM²
AV VELOCITY RATIO: 0.6
BSA FOR ECHO PROCEDURE: 2.14 M2
CV ECHO LV RWT: 0.59 CM
CV STRESS BASE HR: 98 BPM
DIASTOLIC BLOOD PRESSURE: 78 MMHG
DOP CALC AO PEAK VEL: 1.5 M/S
DOP CALC AO VTI: 24.8 CM
DOP CALC LVOT AREA: 3.1 CM2
DOP CALC LVOT DIAMETER: 2 CM
DOP CALC LVOT PEAK VEL: 0.9 M/S
DOP CALC LVOT STROKE VOLUME: 56.8 CM3
DOP CALCLVOT PEAK VEL VTI: 18.1 CM
E WAVE DECELERATION TIME: 195.74 MSEC
E/A RATIO: 0.87
E/E' RATIO: 10.33 M/S
ECHO LV POSTERIOR WALL: 1 CM (ref 0.6–1.1)
FRACTIONAL SHORTENING: 29.4 % (ref 28–44)
INTERVENTRICULAR SEPTUM: 1.3 CM (ref 0.6–1.1)
IVRT: 97.05 MSEC
LEFT ATRIUM AREA SYSTOLIC (APICAL 2 CHAMBER): 12.22 CM2
LEFT ATRIUM AREA SYSTOLIC (APICAL 4 CHAMBER): 16.24 CM2
LEFT ATRIUM SIZE: 4.1 CM
LEFT ATRIUM VOLUME INDEX MOD: 16.4 ML/M2
LEFT ATRIUM VOLUME MOD: 34.28 ML
LEFT INTERNAL DIMENSION IN SYSTOLE: 2.4 CM (ref 2.1–4)
LEFT VENTRICLE DIASTOLIC VOLUME INDEX: 23.34 ML/M2
LEFT VENTRICLE DIASTOLIC VOLUME: 48.78 ML
LEFT VENTRICLE END SYSTOLIC VOLUME APICAL 2 CHAMBER: 26.34 ML
LEFT VENTRICLE END SYSTOLIC VOLUME APICAL 4 CHAMBER: 42.8 ML
LEFT VENTRICLE MASS INDEX: 58.4 G/M2
LEFT VENTRICLE SYSTOLIC VOLUME INDEX: 9.9 ML/M2
LEFT VENTRICLE SYSTOLIC VOLUME: 20.73 ML
LEFT VENTRICULAR INTERNAL DIMENSION IN DIASTOLE: 3.4 CM (ref 3.5–6)
LEFT VENTRICULAR MASS: 122 G
LV LATERAL E/E' RATIO: 7.75 M/S
LV SEPTAL E/E' RATIO: 15.5 M/S
LVED V (TEICH): 48.78 ML
LVES V (TEICH): 20.73 ML
LVOT MG: 1.95 MMHG
LVOT MV: 0.63 CM/S
MV PEAK A VEL: 0.71 M/S
MV PEAK E VEL: 0.62 M/S
MV STENOSIS PRESSURE HALF TIME: 56.76 MS
MV VALVE AREA P 1/2 METHOD: 3.88 CM2
OHS CV CPX 1 MINUTE RECOVERY HEART RATE: 120 BPM
OHS CV CPX 85 PERCENT MAX PREDICTED HEART RATE MALE: 134
OHS CV CPX ESTIMATED METS: 5
OHS CV CPX MAX PREDICTED HEART RATE: 158
OHS CV CPX PATIENT IS FEMALE: 1
OHS CV CPX PATIENT IS MALE: 0
OHS CV CPX PEAK DIASTOLIC BLOOD PRESSURE: 67 MMHG
OHS CV CPX PEAK HEAR RATE: 150 BPM
OHS CV CPX PEAK RATE PRESSURE PRODUCT: ABNORMAL
OHS CV CPX PEAK SYSTOLIC BLOOD PRESSURE: 178 MMHG
OHS CV CPX PERCENT MAX PREDICTED HEART RATE ACHIEVED: 99
OHS CV CPX RATE PRESSURE PRODUCT PRESENTING: ABNORMAL
PISA TR MAX VEL: 2.39 M/S
PULM VEIN S/D RATIO: 1.5
PV PEAK D VEL: 0.3 M/S
PV PEAK S VEL: 0.45 M/S
SINUS: 2.87 CM
STJ: 2.55 CM
STRESS ECHO POST EXERCISE DUR MIN: 3 MINUTES
STRESS ECHO POST EXERCISE DUR SEC: 8 SECONDS
SYSTOLIC BLOOD PRESSURE: 142 MMHG
TDI LATERAL: 0.08 M/S
TDI SEPTAL: 0.04 M/S
TDI: 0.06 M/S
TR MAX PG: 23 MMHG
TRICUSPID ANNULAR PLANE SYSTOLIC EXCURSION: 1.88 CM
Z-SCORE OF LEFT VENTRICULAR DIMENSION IN END DIASTOLE: -6.4
Z-SCORE OF LEFT VENTRICULAR DIMENSION IN END SYSTOLE: -3.92

## 2024-10-18 PROCEDURE — 93351 STRESS TTE COMPLETE: CPT | Mod: PO

## 2024-10-18 PROCEDURE — 93351 STRESS TTE COMPLETE: CPT | Mod: 26,,, | Performed by: INTERNAL MEDICINE

## 2024-10-21 ENCOUNTER — OFFICE VISIT (OUTPATIENT)
Dept: CARDIOLOGY | Facility: CLINIC | Age: 64
End: 2024-10-21
Attending: INTERNAL MEDICINE
Payer: COMMERCIAL

## 2024-10-21 VITALS
DIASTOLIC BLOOD PRESSURE: 85 MMHG | HEIGHT: 68 IN | BODY MASS INDEX: 33.25 KG/M2 | SYSTOLIC BLOOD PRESSURE: 125 MMHG | HEART RATE: 97 BPM | WEIGHT: 219.38 LBS

## 2024-10-21 DIAGNOSIS — I10 ESSENTIAL HYPERTENSION: ICD-10-CM

## 2024-10-21 DIAGNOSIS — I47.29 NSVT (NONSUSTAINED VENTRICULAR TACHYCARDIA): ICD-10-CM

## 2024-10-21 DIAGNOSIS — Z72.0 TOBACCO ABUSE: ICD-10-CM

## 2024-10-21 DIAGNOSIS — I44.7 LBBB (LEFT BUNDLE BRANCH BLOCK): ICD-10-CM

## 2024-10-21 DIAGNOSIS — I25.10 CORONARY ARTERY DISEASE, UNSPECIFIED VESSEL OR LESION TYPE, UNSPECIFIED WHETHER ANGINA PRESENT, UNSPECIFIED WHETHER NATIVE OR TRANSPLANTED HEART: ICD-10-CM

## 2024-10-21 DIAGNOSIS — I70.90 ATHEROSCLEROSIS: ICD-10-CM

## 2024-10-21 DIAGNOSIS — I49.9 IRREGULAR HEART BEATS: ICD-10-CM

## 2024-10-21 DIAGNOSIS — I34.0 NONRHEUMATIC MITRAL VALVE REGURGITATION: ICD-10-CM

## 2024-10-21 DIAGNOSIS — E78.5 HYPERLIPIDEMIA, UNSPECIFIED HYPERLIPIDEMIA TYPE: ICD-10-CM

## 2024-10-21 DIAGNOSIS — F90.9 ATTENTION DEFICIT HYPERACTIVITY DISORDER (ADHD), UNSPECIFIED ADHD TYPE: Primary | ICD-10-CM

## 2024-10-21 LAB
ASCENDING AORTA: 2.89 CM
AV INDEX (PROSTH): 0.73
AV MEAN GRADIENT: 5.3 MMHG
AV PEAK GRADIENT: 9 MMHG
AV VALVE AREA BY VELOCITY RATIO: 1.9 CM²
AV VALVE AREA: 2.3 CM²
AV VELOCITY RATIO: 0.6
BSA FOR ECHO PROCEDURE: 2.14 M2
CV ECHO LV RWT: 0.59 CM
CV STRESS BASE HR: 98 BPM
DIASTOLIC BLOOD PRESSURE: 78 MMHG
DOP CALC AO PEAK VEL: 1.5 M/S
DOP CALC AO VTI: 24.8 CM
DOP CALC LVOT AREA: 3.1 CM2
DOP CALC LVOT DIAMETER: 2 CM
DOP CALC LVOT PEAK VEL: 0.9 M/S
DOP CALC LVOT STROKE VOLUME: 56.8 CM3
DOP CALCLVOT PEAK VEL VTI: 18.1 CM
E WAVE DECELERATION TIME: 195.74 MSEC
E/A RATIO: 0.87
E/E' RATIO: 10.33 M/S
ECHO LV POSTERIOR WALL: 1 CM (ref 0.6–1.1)
FRACTIONAL SHORTENING: 29.4 % (ref 28–44)
INTERVENTRICULAR SEPTUM: 1.3 CM (ref 0.6–1.1)
IVRT: 97.05 MSEC
LEFT ATRIUM AREA SYSTOLIC (APICAL 2 CHAMBER): 12.22 CM2
LEFT ATRIUM AREA SYSTOLIC (APICAL 4 CHAMBER): 16.24 CM2
LEFT ATRIUM SIZE: 4.1 CM
LEFT ATRIUM VOLUME INDEX MOD: 16.4 ML/M2
LEFT ATRIUM VOLUME MOD: 34.28 ML
LEFT INTERNAL DIMENSION IN SYSTOLE: 2.4 CM (ref 2.1–4)
LEFT VENTRICLE DIASTOLIC VOLUME INDEX: 23.34 ML/M2
LEFT VENTRICLE DIASTOLIC VOLUME: 48.78 ML
LEFT VENTRICLE END SYSTOLIC VOLUME APICAL 2 CHAMBER: 26.34 ML
LEFT VENTRICLE END SYSTOLIC VOLUME APICAL 4 CHAMBER: 42.8 ML
LEFT VENTRICLE MASS INDEX: 58.4 G/M2
LEFT VENTRICLE SYSTOLIC VOLUME INDEX: 9.9 ML/M2
LEFT VENTRICLE SYSTOLIC VOLUME: 20.73 ML
LEFT VENTRICULAR INTERNAL DIMENSION IN DIASTOLE: 3.4 CM (ref 3.5–6)
LEFT VENTRICULAR MASS: 122 G
LV LATERAL E/E' RATIO: 7.75 M/S
LV SEPTAL E/E' RATIO: 15.5 M/S
LVED V (TEICH): 48.78 ML
LVES V (TEICH): 20.73 ML
LVOT MG: 1.95 MMHG
LVOT MV: 0.63 CM/S
MV PEAK A VEL: 0.71 M/S
MV PEAK E VEL: 0.62 M/S
MV STENOSIS PRESSURE HALF TIME: 56.76 MS
MV VALVE AREA P 1/2 METHOD: 3.88 CM2
OHS CV CPX 1 MINUTE RECOVERY HEART RATE: 120 BPM
OHS CV CPX 85 PERCENT MAX PREDICTED HEART RATE MALE: 134
OHS CV CPX ESTIMATED METS: 5
OHS CV CPX MAX PREDICTED HEART RATE: 158
OHS CV CPX PATIENT IS FEMALE: 1
OHS CV CPX PATIENT IS MALE: 0
OHS CV CPX PEAK DIASTOLIC BLOOD PRESSURE: 67 MMHG
OHS CV CPX PEAK HEAR RATE: 150 BPM
OHS CV CPX PEAK RATE PRESSURE PRODUCT: ABNORMAL
OHS CV CPX PEAK SYSTOLIC BLOOD PRESSURE: 178 MMHG
OHS CV CPX PERCENT MAX PREDICTED HEART RATE ACHIEVED: 99
OHS CV CPX RATE PRESSURE PRODUCT PRESENTING: ABNORMAL
PISA TR MAX VEL: 2.39 M/S
PULM VEIN S/D RATIO: 1.5
PV PEAK D VEL: 0.3 M/S
PV PEAK S VEL: 0.45 M/S
RA PRESSURE ESTIMATED: 3 MMHG
RV TB RVSP: 5 MMHG
SINUS: 2.87 CM
STJ: 2.55 CM
STRESS ECHO POST EXERCISE DUR MIN: 3 MINUTES
STRESS ECHO POST EXERCISE DUR SEC: 8 SECONDS
STRESS ST DEPRESSION: 1.5 MM
SYSTOLIC BLOOD PRESSURE: 142 MMHG
TDI LATERAL: 0.08 M/S
TDI SEPTAL: 0.04 M/S
TDI: 0.06 M/S
TR MAX PG: 23 MMHG
TRICUSPID ANNULAR PLANE SYSTOLIC EXCURSION: 1.88 CM
TV REST PULMONARY ARTERY PRESSURE: 26 MMHG
Z-SCORE OF LEFT VENTRICULAR DIMENSION IN END DIASTOLE: -6.4
Z-SCORE OF LEFT VENTRICULAR DIMENSION IN END SYSTOLE: -3.92

## 2024-10-21 PROCEDURE — 4010F ACE/ARB THERAPY RXD/TAKEN: CPT | Mod: CPTII,S$GLB,, | Performed by: INTERNAL MEDICINE

## 2024-10-21 PROCEDURE — 99214 OFFICE O/P EST MOD 30 MIN: CPT | Mod: S$GLB,,, | Performed by: INTERNAL MEDICINE

## 2024-10-21 PROCEDURE — 3074F SYST BP LT 130 MM HG: CPT | Mod: CPTII,S$GLB,, | Performed by: INTERNAL MEDICINE

## 2024-10-21 PROCEDURE — 3008F BODY MASS INDEX DOCD: CPT | Mod: CPTII,S$GLB,, | Performed by: INTERNAL MEDICINE

## 2024-10-21 PROCEDURE — 1159F MED LIST DOCD IN RCRD: CPT | Mod: CPTII,S$GLB,, | Performed by: INTERNAL MEDICINE

## 2024-10-21 PROCEDURE — 3044F HG A1C LEVEL LT 7.0%: CPT | Mod: CPTII,S$GLB,, | Performed by: INTERNAL MEDICINE

## 2024-10-21 PROCEDURE — 3079F DIAST BP 80-89 MM HG: CPT | Mod: CPTII,S$GLB,, | Performed by: INTERNAL MEDICINE

## 2024-10-21 PROCEDURE — 99999 PR PBB SHADOW E&M-EST. PATIENT-LVL III: CPT | Mod: PBBFAC,,, | Performed by: INTERNAL MEDICINE

## 2024-10-21 NOTE — PROGRESS NOTES
Subjective:    Patient ID:  Tanya Espinoza is a 64 y.o. female patient here for evaluation Follow-up (Test results)      History of Present Illness:  Visit.  Nonischemic dilated cardiomyopathy.  Last echo with EF 50-55%.  Echo EF has been documented to be 40% in the past.  See left heart catheterization 03/17/2024.     Recurrent strep staph infections.  Most recent involving the lower extremities resolved.    PAF, ablation x2, most V years ago.  Remains in normal sinus rhythm.    Review of patient's allergies indicates:   Allergen Reactions    Adhesive Rash    Effexor [venlafaxine] Other (See Comments)     Bruising all over body and elevated blood pressure       Past Medical History:   Diagnosis Date    ADHD (attention deficit hyperactivity disorder)     Anemia     Anticoagulant long-term use     Anxiety     CHF (congestive heart failure)     Coronary artery disease     CAD    Elevated LFTs     elevated enzymes    GERD (gastroesophageal reflux disease) 04/24/2021    Headache     Hepatomegaly     Hyperlipemia 04/24/2021    Hypertension     LBBB (left bundle branch block)     Lower extremity edema     Major depressive disorder, recurrent episode, mild 01/03/2022    Obesity     Paroxysmal A-fib     Skin cancer     skin ca     Past Surgical History:   Procedure Laterality Date    ABLATION  04/25/2021    to heart for a-fib x 2    COLONOSCOPY  08/07/2024    CORONARY ANGIOGRAPHY N/A 05/06/2021    Procedure: ANGIOGRAM, CORONARY ARTERY;  Surgeon: Jesus Sol MD;  Location: [facility];  Service: Cardiology;  Laterality: N/A;    LEFT HEART CATHETERIZATION N/A 05/06/2021    Procedure: Left heart cath;  Surgeon: Jesus Sol MD;  Location: [facility];  Service: Cardiology;  Laterality: N/A;    LEFT HEART CATHETERIZATION  03/21/2024    Procedure: Left heart cath;  Surgeon: Jorge Hinkle MD;  Location: [facility];  Service: Cardiology;;    TONSILLECTOMY      TUBAL LIGATION       Social History     Tobacco Use     Smoking status: Former     Current packs/day: 0.00     Average packs/day: 1 pack/day for 40.0 years (40.0 ttl pk-yrs)     Types: Cigarettes     Quit date: 2023     Years since quittin.3    Smokeless tobacco: Never    Tobacco comments:     down to 0.25 pack of  cigarettes per day   Substance Use Topics    Alcohol use: Not Currently    Drug use: No        Review of Systems:    As noted in HPI in addition      REVIEW OF SYSTEMS  Review of Systems   Constitutional: Positive for malaise/fatigue. Negative for decreased appetite, diaphoresis, night sweats, weight gain and weight loss.   HENT:  Negative for nosebleeds and odynophagia.    Eyes:  Negative for double vision and photophobia.   Cardiovascular:  Negative for chest pain, claudication, cyanosis, dyspnea on exertion, irregular heartbeat, leg swelling, near-syncope, orthopnea, palpitations, paroxysmal nocturnal dyspnea and syncope.   Respiratory:  Negative for cough, hemoptysis, shortness of breath and wheezing.    Hematologic/Lymphatic: Negative for adenopathy.   Skin:  Negative for flushing, skin cancer and suspicious lesions.   Musculoskeletal:  Negative for gout, myalgias and neck pain.   Gastrointestinal:  Negative for abdominal pain, heartburn, hematemesis and hematochezia.   Genitourinary:  Negative for bladder incontinence, hesitancy and nocturia.   Neurological:  Negative for focal weakness, headaches, light-headedness and paresthesias.   Psychiatric/Behavioral:  Negative for memory loss and substance abuse.               Objective:        Vitals:    10/21/24 1348   BP: 125/85   Pulse: 97       Lab Results   Component Value Date    WBC 8.25 10/15/2024    HGB 12.8 10/15/2024    HCT 38.8 10/15/2024     10/15/2024    CHOL 195 2024    TRIG 199 (H) 2024    HDL 69 2024    ALT 24 10/15/2024    AST 29 10/15/2024     10/15/2024    K 3.9 10/15/2024     10/15/2024    CREATININE 0.96 10/15/2024    BUN 23 (H) 10/15/2024    CO2  27 10/15/2024    TSH 3.530 03/18/2024    INR 1.0 05/14/2021    HGBA1C 5.1 07/30/2024        ECHOCARDIOGRAM RESULTS  Results for orders placed during the hospital encounter of 09/20/24    Echo    Interpretation Summary    Left Ventricle: The left ventricle is normal in size. Normal wall thickness. There is low normal systolic function with a visually estimated ejection fraction of 50 - 55%. Grade I diastolic dysfunction.    Right Ventricle: Normal right ventricular cavity size. Wall thickness is normal. Systolic function is normal.    Pulmonary Artery: No pulmonary hypertension. The estimated pulmonary artery systolic pressure is 22 mmHg.    IVC/SVC: Normal venous pressure at 3 mmHg.    Results for orders placed during the hospital encounter of 03/17/24    Cardiac catheterization    Conclusion    There was non-obstructive coronary artery disease, unchanged compared to prior angiogram in 2021.    There was mild to moderate left ventricular systolic dysfunction.    The ejection fraction was calculated to be 40%.    The left ventricular end diastolic pressure was elevated.    The procedure log was documented by Documenter: Tomer Cortez RN and verified by Jorge Hinkle MD.    Date: 3/21/2024  Time: 10:05 AM          CURRENT/PREVIOUS VISIT EKG  Results for orders placed or performed during the hospital encounter of 10/15/24   EKG 12-lead    Collection Time: 10/15/24  7:06 PM   Result Value Ref Range    QRS Duration 116 ms    [facility] QTC Calculation 437 ms    Narrative    Test Reason : M79.89,    Vent. Rate : 102 BPM     Atrial Rate : 102 BPM     P-R Int : 174 ms          QRS Dur : 116 ms      QT Int : 336 ms       P-R-T Axes : 058 037 171 degrees     QTc Int : 437 ms    Sinus tachycardia  Incomplete left bundle branch block  ST and T wave abnormality, consider lateral ischemia  Abnormal ECG  When compared with ECG of 26-APR-2024 12:38,  No significant change was found  Confirmed by Babatunde Rubin MD ([id number])  on 10/16/2024 11:00:44 AM    Referred By: GIRISH   SELF           Confirmed By:Babatunde Rubin MD     No valid procedures specified.   Results for orders placed during the hospital encounter of 04/24/21    Nuclear Stress - Cardiology Interpreted    Interpretation Summary    There is a mild intensity, small sized, reversible defect that is consistent with ischemia in the anterobasilar wall(s).  Low risk scan    The gated perfusion images showed an ejection fraction of 52% at rest.    The EKG portion of this study is uninterpretable.    There were no arrhythmias during stress.    No valid procedures specified.    PHYSICAL EXAM  GENERAL: well built, well nourished, well-developed in no apparent distress alert and oriented.   HEENT: Normocephalic. Pupils normal and conjunctivae normal.  Mucous membranes normal, no cyanosis or icterus, trachea central,no pallor or icterus is noted..   NECK: No JVD. No bruit..   THYROID: Thyroid not enlarged. No nodules present..   CARDIAC:  Normal S1-S2.  No murmur rub click or gallop.  PMI nondisplaced.  LUNGS: Clear to auscultation. No wheezing or rhonchi..   ABDOMEN: Soft no masses or organomegaly.  No abdomen pulsations or bruits.  Normal bowel sounds. No pulsations and no masses felt, No guarding or rebound.   URINARY: No carlson catheter   EXTREMITIES: No cyanosis, clubbing or edema noted at this time., no calf tenderness bilaterally.   PERIPHERAL VASCULAR SYSTEM: Good palpable distal pulses.  2+ femoral, popliteal and pedal pulses.  No bruits    CENTRAL NERVOUS SYSTEM: No focal motor or sensory deficits noted.   SKIN: Skin without lesions, moist, well perfused.   MUSCLE STRENGTH & TONE: No noteable weakness, atrophy or abnormal movement    I HAVE REVIEWED :    The vital signs, nurses notes, and all the pertinent radiology and labs.         Current Outpatient Medications   Medication Instructions    atorvastatin (LIPITOR) 20 mg, Oral, Nightly    cephALEXin (KEFLEX) 500 mg,  Oral, 4 times daily    estradioL (ESTRACE) 0.01 % (0.1 mg/gram) vaginal cream Place 1 fingertip of cream first at urethral opening and then external vagina every other day. Please do not use plastic applicator    multivitamin with minerals tablet 1 tablet, Daily    nitrofurantoin, macrocrystal-monohydrate, (MACROBID) 100 MG capsule 100 mg, Oral, 2 times daily    sacubitriL-valsartan (ENTRESTO) 24-26 mg per tablet 1 tablet, Oral, 2 times daily          Assessment:   PA, ablation x2.  Remains in normal sinus rhythm.  Grade 1 diastolic dysfunction, left heart catheterization with nonobstructive CAD 03/2024.    Recurrent strep /staph infections, needs to see ID.    Dyslipidemia.    Plan:   Continue goal-directed medical management.  Lasix as needed.    Recurrent infections, should follow up with ID.    Evaluation in the past including left heart catheterization negative for ischemic heart disease.  Most recent EF 50-55%.          No follow-ups on file.

## 2024-10-22 ENCOUNTER — PATIENT MESSAGE (OUTPATIENT)
Dept: CARDIOLOGY | Facility: CLINIC | Age: 64
End: 2024-10-22
Payer: COMMERCIAL

## 2024-10-22 ENCOUNTER — TELEPHONE (OUTPATIENT)
Dept: CARDIOLOGY | Facility: CLINIC | Age: 64
End: 2024-10-22
Payer: COMMERCIAL

## 2024-10-22 NOTE — TELEPHONE ENCOUNTER
Spoke with pt regarding Stress Echo results that came up after her visit with Dr Ruvalcaba. Will clarify with Dr Ruvalcaba that there is no change. Per results and OV we will continue medical management.

## 2024-10-25 ENCOUNTER — LAB VISIT (OUTPATIENT)
Dept: LAB | Facility: HOSPITAL | Age: 64
End: 2024-10-25
Payer: COMMERCIAL

## 2024-10-25 ENCOUNTER — PATIENT MESSAGE (OUTPATIENT)
Dept: UROLOGY | Facility: CLINIC | Age: 64
End: 2024-10-25
Payer: COMMERCIAL

## 2024-10-25 DIAGNOSIS — N39.0 RECURRENT UTI: ICD-10-CM

## 2024-10-25 DIAGNOSIS — N39.0 RECURRENT UTI: Primary | ICD-10-CM

## 2024-10-25 LAB
BACTERIA #/AREA URNS HPF: ABNORMAL /HPF
HYALINE CASTS #/AREA URNS LPF: 1 /LPF
MICROSCOPIC COMMENT: ABNORMAL
NON-SQ EPI CELLS #/AREA URNS HPF: 1 /HPF
SQUAMOUS #/AREA URNS HPF: 4 /HPF
WBC #/AREA URNS HPF: 4 /HPF (ref 0–5)

## 2024-10-25 PROCEDURE — 87086 URINE CULTURE/COLONY COUNT: CPT

## 2024-10-25 PROCEDURE — 81000 URINALYSIS NONAUTO W/SCOPE: CPT | Mod: PO

## 2024-10-26 LAB
BACTERIA UR CULT: NORMAL
BACTERIA UR CULT: NORMAL

## 2024-10-28 ENCOUNTER — PATIENT MESSAGE (OUTPATIENT)
Dept: UROLOGY | Facility: CLINIC | Age: 64
End: 2024-10-28
Payer: COMMERCIAL

## 2024-10-29 ENCOUNTER — CLINICAL SUPPORT (OUTPATIENT)
Dept: UROLOGY | Facility: CLINIC | Age: 64
End: 2024-10-29
Payer: COMMERCIAL

## 2024-10-29 DIAGNOSIS — N39.0 RECURRENT UTI: Primary | ICD-10-CM

## 2024-10-29 LAB
AMORPH CRY URNS QL MICRO: ABNORMAL
BACTERIA #/AREA URNS HPF: ABNORMAL /HPF
BILIRUBIN, UA POC OHS: NEGATIVE
BLOOD, UA POC OHS: NEGATIVE
CLARITY, UA POC OHS: CLEAR
COLOR, UA POC OHS: YELLOW
GLUCOSE, UA POC OHS: NEGATIVE
KETONES, UA POC OHS: NEGATIVE
LEUKOCYTES, UA POC OHS: NEGATIVE
MICROSCOPIC COMMENT: ABNORMAL
NITRITE, UA POC OHS: NEGATIVE
PH, UA POC OHS: 5.5
PROTEIN, UA POC OHS: 30
SPECIFIC GRAVITY, UA POC OHS: >=1.03
UROBILINOGEN, UA POC OHS: 0.2

## 2024-10-29 PROCEDURE — 99999 PR PBB SHADOW E&M-EST. PATIENT-LVL II: CPT | Mod: PBBFAC,,,

## 2024-10-29 PROCEDURE — 87086 URINE CULTURE/COLONY COUNT: CPT

## 2024-10-29 PROCEDURE — 51701 INSERT BLADDER CATHETER: CPT | Mod: S$GLB,,,

## 2024-10-29 PROCEDURE — 81003 URINALYSIS AUTO W/O SCOPE: CPT | Mod: QW,S$GLB,,

## 2024-10-29 PROCEDURE — 81000 URINALYSIS NONAUTO W/SCOPE: CPT | Mod: PO

## 2024-10-30 LAB — BACTERIA UR CULT: NO GROWTH

## 2024-10-31 ENCOUNTER — TELEPHONE (OUTPATIENT)
Dept: UROLOGY | Facility: CLINIC | Age: 64
End: 2024-10-31
Payer: COMMERCIAL

## 2024-11-01 DIAGNOSIS — E78.5 HYPERLIPIDEMIA, UNSPECIFIED HYPERLIPIDEMIA TYPE: ICD-10-CM

## 2024-11-01 RX ORDER — ATORVASTATIN CALCIUM 20 MG/1
20 TABLET, FILM COATED ORAL NIGHTLY
Qty: 30 TABLET | Refills: 3 | Status: SHIPPED | OUTPATIENT
Start: 2024-11-01 | End: 2025-11-01

## 2024-11-04 ENCOUNTER — OFFICE VISIT (OUTPATIENT)
Dept: FAMILY MEDICINE | Facility: CLINIC | Age: 64
End: 2024-11-04
Payer: COMMERCIAL

## 2024-11-04 VITALS
DIASTOLIC BLOOD PRESSURE: 86 MMHG | SYSTOLIC BLOOD PRESSURE: 146 MMHG | RESPIRATION RATE: 16 BRPM | BODY MASS INDEX: 33.12 KG/M2 | OXYGEN SATURATION: 100 % | HEIGHT: 68 IN | WEIGHT: 218.5 LBS | TEMPERATURE: 98 F | HEART RATE: 93 BPM

## 2024-11-04 DIAGNOSIS — M25.531 PAIN OF BOTH WRIST JOINTS: ICD-10-CM

## 2024-11-04 DIAGNOSIS — M25.532 PAIN OF BOTH WRIST JOINTS: ICD-10-CM

## 2024-11-04 DIAGNOSIS — R21 RASH: Primary | ICD-10-CM

## 2024-11-04 DIAGNOSIS — Z86.19 FREQUENT INFECTIONS: ICD-10-CM

## 2024-11-04 DIAGNOSIS — M25.50 ARTHRALGIA, UNSPECIFIED JOINT: ICD-10-CM

## 2024-11-04 PROCEDURE — 1160F RVW MEDS BY RX/DR IN RCRD: CPT | Mod: CPTII,S$GLB,, | Performed by: NURSE PRACTITIONER

## 2024-11-04 PROCEDURE — 4010F ACE/ARB THERAPY RXD/TAKEN: CPT | Mod: CPTII,S$GLB,, | Performed by: NURSE PRACTITIONER

## 2024-11-04 PROCEDURE — 3079F DIAST BP 80-89 MM HG: CPT | Mod: CPTII,S$GLB,, | Performed by: NURSE PRACTITIONER

## 2024-11-04 PROCEDURE — 3077F SYST BP >= 140 MM HG: CPT | Mod: CPTII,S$GLB,, | Performed by: NURSE PRACTITIONER

## 2024-11-04 PROCEDURE — 3044F HG A1C LEVEL LT 7.0%: CPT | Mod: CPTII,S$GLB,, | Performed by: NURSE PRACTITIONER

## 2024-11-04 PROCEDURE — 99214 OFFICE O/P EST MOD 30 MIN: CPT | Mod: S$GLB,,, | Performed by: NURSE PRACTITIONER

## 2024-11-04 PROCEDURE — 1159F MED LIST DOCD IN RCRD: CPT | Mod: CPTII,S$GLB,, | Performed by: NURSE PRACTITIONER

## 2024-11-04 PROCEDURE — 3008F BODY MASS INDEX DOCD: CPT | Mod: CPTII,S$GLB,, | Performed by: NURSE PRACTITIONER

## 2024-11-04 RX ORDER — FUROSEMIDE 40 MG/1
40 TABLET ORAL
COMMUNITY

## 2024-11-04 RX ORDER — LEMBOREXANT 10 MG/1
1 TABLET, FILM COATED ORAL NIGHTLY
COMMUNITY

## 2024-11-04 NOTE — PROGRESS NOTES
Subjective:       Patient ID: Tanya Espinoza is a 64 y.o. female.    Chief Complaint: Joint Pain (Arms, shoulders, wrist , feet     )    Joint Pain  Associated symptoms include arthralgias, joint swelling, myalgias and a rash. Pertinent negatives include no abdominal pain, chest pain, coughing, fatigue, fever, headaches, nausea or vomiting.     States Dr. Ruvalcaba said she needs to see ID or Rheumatology. Has hives. bilateral wrist pain with hives. States she cannot raise her arms to get anything out of a cabinet. States decreased muscle mass.  The rash started in March. Moves around her body. Wrists get so swollen she can't make a fist.  Felt that infection is what caused her heart issues. States losing hair. She has had recurrent strep staph infections. Rash itches. She has pictures of the rash on her lower legs when it was there. She denies fever or night sweats. States she was on antibiotics a lot this year with recurrent UTI's and Cellulitis. States she will get red, inflamed rash over her nose and cheeks. States it gets dry and peels and then goes away until the next time it flares up. States she was advised to see Dermatology. No appointments with [facility] for a year. Will refer outside of [facility].     Recent labs done 10/15 showed normal ESR, CRP and Uric acid. Labs reviewed. Will discuss with her PCP if any additional labs and referrals recommended. States she saw her PCP recently but did not discuss her joint pain or rash with her.    Cardiology note reviewed:    History of Present Illness:  Visit.  Nonischemic dilated cardiomyopathy.  Last echo with EF 50-55%.  Echo EF has been documented to be 40% in the past.  See left heart catheterization 03/17/2024.     Recurrent strep staph infections.  Most recent involving the lower extremities resolved.    PA, ablation x2.  Remains in normal sinus rhythm.  Grade 1 diastolic dysfunction, left heart catheterization with nonobstructive CAD 03/2024.     Recurrent  strep /staph infections, needs to see ID.    See ROS    The following portion of the patients history was reviewed and updated as appropriate: allergies, current medications, past medical and surgical history. Past social history and problem list reviewed. Family PMH and Past social history reviewed. Tobacco, Illicit drug use reviewed.      Review of patient's allergies indicates:   Allergen Reactions    Adhesive Rash    Effexor [venlafaxine] Other (See Comments)     Bruising all over body and elevated blood pressure         Current Outpatient Medications:     atorvastatin (LIPITOR) 20 MG tablet, take 1 tablet (20 mg total) by mouth every evening., Disp: 30 tablet, Rfl: 3    DAYVIGO 10 mg Tab, Take 1 tablet by mouth every evening., Disp: , Rfl:     estradioL (ESTRACE) 0.01 % (0.1 mg/gram) vaginal cream, Place 1 fingertip of cream first at urethral opening and then external vagina every other day. Please do not use plastic applicator, Disp: 42.5 g, Rfl: 3    furosemide (LASIX) 40 MG tablet, Take 40 mg by mouth as needed., Disp: , Rfl:     multivitamin with minerals tablet, Take 1 tablet by mouth once daily., Disp: , Rfl:     sacubitriL-valsartan (ENTRESTO) 24-26 mg per tablet, Take 1 tablet by mouth 2 (two) times daily., Disp: 60 tablet, Rfl: 1    Past Medical History:   Diagnosis Date    ADHD (attention deficit hyperactivity disorder)     Anemia     Anticoagulant long-term use     Anxiety     CHF (congestive heart failure)     Coronary artery disease     CAD    Elevated LFTs     elevated enzymes    GERD (gastroesophageal reflux disease) 04/24/2021    Headache     Hepatomegaly     Hyperlipemia 04/24/2021    Hypertension     LBBB (left bundle branch block)     Lower extremity edema     Major depressive disorder, recurrent episode, mild 01/03/2022    Obesity     Paroxysmal A-fib     Skin cancer     skin ca       Past Surgical History:   Procedure Laterality Date    ABLATION  04/25/2021    to heart for a-fib x 2     COLONOSCOPY  2024    CORONARY ANGIOGRAPHY N/A 2021    Procedure: ANGIOGRAM, CORONARY ARTERY;  Surgeon: Jesus Sol MD;  Location: [facility];  Service: Cardiology;  Laterality: N/A;    LEFT HEART CATHETERIZATION N/A 2021    Procedure: Left heart cath;  Surgeon: Jesus Sol MD;  Location: [facility];  Service: Cardiology;  Laterality: N/A;    LEFT HEART CATHETERIZATION  2024    Procedure: Left heart cath;  Surgeon: Jorge Hinkle MD;  Location: [facility];  Service: Cardiology;;    TONSILLECTOMY      TUBAL LIGATION         Social History     Socioeconomic History    Marital status:    Occupational History    Occupation: refurbish [employer]   Tobacco Use    Smoking status: Former     Current packs/day: 0.00     Average packs/day: 1 pack/day for 40.0 years (40.0 ttl pk-yrs)     Types: Cigarettes     Quit date: 2023     Years since quittin.4    Smokeless tobacco: Never    Tobacco comments:     down to 0.25 pack of  cigarettes per day   Substance and Sexual Activity    Alcohol use: Not Currently    Drug use: No     Social Drivers of Health     Financial Resource Strain: Low Risk  (8/15/2024)    Overall Financial Resource Strain (CARDIA)     Difficulty of Paying Living Expenses: Not very hard   Food Insecurity: No Food Insecurity (8/15/2024)    Hunger Vital Sign     Worried About Running Out of Food in the Last Year: Never true     Ran Out of Food in the Last Year: Never true   Transportation Needs: No Transportation Needs (3/18/2024)    PRAPARE - Transportation     Lack of Transportation (Medical): No     Lack of Transportation (Non-Medical): No   Physical Activity: Unknown (8/15/2024)    Exercise Vital Sign     Days of Exercise per Week: 0 days   Stress: Stress Concern Present (8/15/2024)    [employer] - Occupational Stress Questionnaire     Feeling of Stress : Very much   Housing Stability: Unknown (8/15/2024)    Housing  "Stability Vital Sign     Unable to Pay for Housing in the Last Year: Patient declined     Review of Systems   Constitutional:  Negative for fatigue and fever.   HENT: Negative.     Eyes:  Negative for visual disturbance.   Respiratory:  Negative for cough, chest tightness, shortness of breath and wheezing.    Cardiovascular:  Negative for chest pain, palpitations and leg swelling.   Gastrointestinal:  Negative for abdominal pain, diarrhea, nausea and vomiting.   Genitourinary: Negative.    Musculoskeletal:  Positive for arthralgias, joint swelling and myalgias. Negative for back pain and gait problem.   Skin:  Positive for rash.   Neurological:  Negative for headaches.   Psychiatric/Behavioral:  Negative for dysphoric mood and sleep disturbance. The patient is not nervous/anxious.        Objective:      BP (!) 146/86   Pulse 93   Temp 98.2 °F (36.8 °C) (Temporal)   Resp 16   Ht 5' 8" (1.727 m)   Wt 99.1 kg (218 lb 7.6 oz)   LMP 10/28/2016 (Approximate)   SpO2 100%   BMI 33.22 kg/m²      Physical Exam  Constitutional:       Appearance: Normal appearance. She is obese.   HENT:      Head: Normocephalic.   Neck:      Thyroid: No thyromegaly.      Vascular: No carotid bruit.   Cardiovascular:      Rate and Rhythm: Normal rate and regular rhythm.      Pulses: Normal pulses.      Heart sounds: Normal heart sounds. No murmur heard.  Pulmonary:      Effort: Pulmonary effort is normal.      Breath sounds: Normal breath sounds. No wheezing.   Abdominal:      General: Bowel sounds are normal.      Tenderness: There is no abdominal tenderness.   Musculoskeletal:         General: Normal range of motion.      Cervical back: Normal range of motion.      Right lower leg: No edema.      Left lower leg: No edema.      Comments: Gait normal. Trouble making a tight fist bilateral from inflammation. Rash, warmth over bilateral anterior wrists.    Skin:     General: Skin is warm and dry.      Capillary Refill: Capillary refill " takes less than 2 seconds.      Findings: Rash present.      Comments: No facial rash noted at this time   Neurological:      General: No focal deficit present.      Mental Status: She is alert.   Psychiatric:         Attention and Perception: Attention and perception normal.         Mood and Affect: Mood and affect normal.         Speech: Speech normal.         Behavior: Behavior normal.         Assessment:       1. Rash    2. Frequent infections    3. Pain of both wrist joints    4. Arthralgia, unspecified joint        Plan:       Rash: refer to Dermatology  -     Ambulatory referral/consult to Dermatology; Future; Expected date: 11/11/2024    Frequent infections: refer to ID per MD request  -     Ambulatory referral/consult to Infectious Disease; Future; Expected date: 11/11/2024    Pain of both wrist joints: spoke with her PCP. Lab orders placed.    Arthralgia, unspecified joint: lab orders placed.   -     Sedimentation rate; Future; Expected date: 11/06/2024  -     C-REACTIVE PROTEIN; Future; Expected date: 11/06/2024  -     TSH; Future; Expected date: 11/06/2024  -     SAULO; Future; Expected date: 11/06/2024  -     ANTI-SSB ANTIBODY; Future; Expected date: 11/06/2024  -     ANTI -SSA ANTIBODY; Future; Expected date: 11/06/2024  -     COMPREHENSIVE METABOLIC PANEL; Future; Expected date: 11/06/2024       Continue current medication  Take medications only as prescribed  Healthy diet, exercise  Adequate rest  Adequate hydration  Avoid allergens  Avoid excessive caffeine     Follow up with PCP

## 2024-11-04 NOTE — Clinical Note
Let her know I spoke with Dr. Rodriguez.  Lab orders placed, arthritis markers. Please ask her to get these done.  Please schedule.

## 2024-11-06 ENCOUNTER — TELEPHONE (OUTPATIENT)
Dept: FAMILY MEDICINE | Facility: CLINIC | Age: 64
End: 2024-11-06
Payer: COMMERCIAL

## 2024-11-06 ENCOUNTER — LAB VISIT (OUTPATIENT)
Dept: LAB | Facility: HOSPITAL | Age: 64
End: 2024-11-06
Payer: COMMERCIAL

## 2024-11-06 DIAGNOSIS — M25.50 ARTHRALGIA, UNSPECIFIED JOINT: ICD-10-CM

## 2024-11-06 LAB
ALBUMIN SERPL BCP-MCNC: 3.4 G/DL (ref 3.5–5.2)
ALP SERPL-CCNC: 117 U/L (ref 40–150)
ALT SERPL W/O P-5'-P-CCNC: 44 U/L (ref 10–44)
ANION GAP SERPL CALC-SCNC: 11 MMOL/L (ref 8–16)
AST SERPL-CCNC: 31 U/L (ref 10–40)
BILIRUB SERPL-MCNC: 0.8 MG/DL (ref 0.1–1)
BUN SERPL-MCNC: 14 MG/DL (ref 8–23)
CALCIUM SERPL-MCNC: 10.1 MG/DL (ref 8.7–10.5)
CHLORIDE SERPL-SCNC: 108 MMOL/L (ref 95–110)
CO2 SERPL-SCNC: 23 MMOL/L (ref 23–29)
CREAT SERPL-MCNC: 0.8 MG/DL (ref 0.5–1.4)
CRP SERPL-MCNC: 3.3 MG/L (ref 0–8.2)
ERYTHROCYTE [SEDIMENTATION RATE] IN BLOOD BY PHOTOMETRIC METHOD: 22 MM/HR (ref 0–36)
EST. GFR  (NO RACE VARIABLE): >60 ML/MIN/1.73 M^2
GLUCOSE SERPL-MCNC: 132 MG/DL (ref 70–110)
POTASSIUM SERPL-SCNC: 4.3 MMOL/L (ref 3.5–5.1)
PROT SERPL-MCNC: 7.5 G/DL (ref 6–8.4)
SODIUM SERPL-SCNC: 142 MMOL/L (ref 136–145)
TSH SERPL DL<=0.005 MIU/L-ACNC: 2.03 UIU/ML (ref 0.4–4)

## 2024-11-06 PROCEDURE — 85652 RBC SED RATE AUTOMATED: CPT | Performed by: NURSE PRACTITIONER

## 2024-11-06 PROCEDURE — 86235 NUCLEAR ANTIGEN ANTIBODY: CPT | Performed by: NURSE PRACTITIONER

## 2024-11-06 PROCEDURE — 36415 COLL VENOUS BLD VENIPUNCTURE: CPT | Mod: PO | Performed by: NURSE PRACTITIONER

## 2024-11-06 PROCEDURE — 86225 DNA ANTIBODY NATIVE: CPT | Performed by: NURSE PRACTITIONER

## 2024-11-06 PROCEDURE — 86235 NUCLEAR ANTIGEN ANTIBODY: CPT | Mod: 59 | Performed by: NURSE PRACTITIONER

## 2024-11-06 PROCEDURE — 84443 ASSAY THYROID STIM HORMONE: CPT | Performed by: NURSE PRACTITIONER

## 2024-11-06 PROCEDURE — 80053 COMPREHEN METABOLIC PANEL: CPT | Performed by: NURSE PRACTITIONER

## 2024-11-06 PROCEDURE — 86038 ANTINUCLEAR ANTIBODIES: CPT | Performed by: NURSE PRACTITIONER

## 2024-11-06 PROCEDURE — 86140 C-REACTIVE PROTEIN: CPT | Performed by: NURSE PRACTITIONER

## 2024-11-06 PROCEDURE — 86039 ANTINUCLEAR ANTIBODIES (ANA): CPT | Performed by: NURSE PRACTITIONER

## 2024-11-06 NOTE — TELEPHONE ENCOUNTER
----- Message from Bety Garcia NP sent at 11/6/2024  7:31 AM CST -----  Let her know I spoke with Dr. Rodriguez.  Lab orders placed, arthritis markers. Please ask her to get these done.  Please schedule.

## 2024-11-07 ENCOUNTER — PATIENT MESSAGE (OUTPATIENT)
Dept: FAMILY MEDICINE | Facility: CLINIC | Age: 64
End: 2024-11-07
Payer: COMMERCIAL

## 2024-11-07 LAB
ANA PATTERN 1: NORMAL
ANA SER QL IF: POSITIVE
ANA TITR SER IF: NORMAL {TITER}
ANTI-SSA ANTIBODY: 0.05 RATIO (ref 0–0.99)
ANTI-SSA INTERPRETATION: NEGATIVE
ANTI-SSB ANTIBODY: 0.07 RATIO (ref 0–0.99)
ANTI-SSB INTERPRETATION: NEGATIVE

## 2024-11-08 LAB
ANTI SM ANTIBODY: 0.07 RATIO (ref 0–0.99)
ANTI SM/RNP ANTIBODY: 0.06 RATIO (ref 0–0.99)
ANTI-SM INTERPRETATION: NEGATIVE
ANTI-SM/RNP INTERPRETATION: NEGATIVE
ANTI-SSA ANTIBODY: 0.05 RATIO (ref 0–0.99)
ANTI-SSA INTERPRETATION: NEGATIVE
ANTI-SSB ANTIBODY: 0.07 RATIO (ref 0–0.99)
ANTI-SSB INTERPRETATION: NEGATIVE
DSDNA AB SER-ACNC: NORMAL [IU]/ML

## 2024-11-13 ENCOUNTER — PATIENT MESSAGE (OUTPATIENT)
Dept: FAMILY MEDICINE | Facility: CLINIC | Age: 64
End: 2024-11-13
Payer: COMMERCIAL

## 2024-11-13 DIAGNOSIS — M25.50 ARTHRALGIA, UNSPECIFIED JOINT: Primary | ICD-10-CM

## 2025-01-02 ENCOUNTER — OFFICE VISIT (OUTPATIENT)
Dept: FAMILY MEDICINE | Facility: CLINIC | Age: 65
End: 2025-01-02
Payer: COMMERCIAL

## 2025-01-02 VITALS
BODY MASS INDEX: 34.96 KG/M2 | HEART RATE: 95 BPM | DIASTOLIC BLOOD PRESSURE: 110 MMHG | HEIGHT: 68 IN | SYSTOLIC BLOOD PRESSURE: 130 MMHG | OXYGEN SATURATION: 97 % | WEIGHT: 230.69 LBS | RESPIRATION RATE: 18 BRPM | TEMPERATURE: 98 F

## 2025-01-02 DIAGNOSIS — N95.2 ATROPHIC VAGINITIS: ICD-10-CM

## 2025-01-02 DIAGNOSIS — G31.84 MCI (MILD COGNITIVE IMPAIRMENT): ICD-10-CM

## 2025-01-02 DIAGNOSIS — I42.8 NICM (NONISCHEMIC CARDIOMYOPATHY): ICD-10-CM

## 2025-01-02 DIAGNOSIS — F43.21 GRIEF AT LOSS OF CHILD: ICD-10-CM

## 2025-01-02 DIAGNOSIS — I10 PRIMARY HYPERTENSION: ICD-10-CM

## 2025-01-02 DIAGNOSIS — G43.009 MIGRAINE WITHOUT AURA AND WITHOUT STATUS MIGRAINOSUS, NOT INTRACTABLE: ICD-10-CM

## 2025-01-02 DIAGNOSIS — K76.0 FATTY LIVER: ICD-10-CM

## 2025-01-02 DIAGNOSIS — I67.1 NONRUPTURED CEREBRAL ANEURYSM: ICD-10-CM

## 2025-01-02 DIAGNOSIS — I48.0 PAROXYSMAL ATRIAL FIBRILLATION: ICD-10-CM

## 2025-01-02 DIAGNOSIS — R73.03 PREDIABETES: ICD-10-CM

## 2025-01-02 DIAGNOSIS — Z63.4 GRIEF AT LOSS OF CHILD: ICD-10-CM

## 2025-01-02 DIAGNOSIS — F33.1 MAJOR DEPRESSIVE DISORDER, RECURRENT EPISODE, MODERATE: ICD-10-CM

## 2025-01-02 DIAGNOSIS — F51.01 PRIMARY INSOMNIA: ICD-10-CM

## 2025-01-02 DIAGNOSIS — E66.09 CLASS 1 OBESITY DUE TO EXCESS CALORIES WITH SERIOUS COMORBIDITY AND BODY MASS INDEX (BMI) OF 31.0 TO 31.9 IN ADULT: ICD-10-CM

## 2025-01-02 DIAGNOSIS — Q21.12 PFO (PATENT FORAMEN OVALE): ICD-10-CM

## 2025-01-02 DIAGNOSIS — I50.22 CHRONIC SYSTOLIC HEART FAILURE: ICD-10-CM

## 2025-01-02 DIAGNOSIS — I25.10 CORONARY ARTERY DISEASE INVOLVING NATIVE CORONARY ARTERY OF NATIVE HEART WITHOUT ANGINA PECTORIS: Primary | ICD-10-CM

## 2025-01-02 DIAGNOSIS — E66.811 CLASS 1 OBESITY DUE TO EXCESS CALORIES WITH SERIOUS COMORBIDITY AND BODY MASS INDEX (BMI) OF 31.0 TO 31.9 IN ADULT: ICD-10-CM

## 2025-01-02 DIAGNOSIS — E78.5 HYPERLIPIDEMIA, UNSPECIFIED HYPERLIPIDEMIA TYPE: ICD-10-CM

## 2025-01-02 DIAGNOSIS — Z79.899 MEDICATION MANAGEMENT: ICD-10-CM

## 2025-01-02 DIAGNOSIS — L50.8 CHRONIC URTICARIA: ICD-10-CM

## 2025-01-02 DIAGNOSIS — K21.00 GASTROESOPHAGEAL REFLUX DISEASE WITH ESOPHAGITIS WITHOUT HEMORRHAGE: ICD-10-CM

## 2025-01-02 RX ORDER — FLUOXETINE 10 MG/1
10 CAPSULE ORAL DAILY
Qty: 30 CAPSULE | Refills: 11 | Status: SHIPPED | OUTPATIENT
Start: 2025-01-02 | End: 2026-01-02

## 2025-01-02 RX ORDER — ALPRAZOLAM 0.5 MG/1
0.5 TABLET ORAL DAILY PRN
Qty: 10 TABLET | Refills: 0 | Status: SHIPPED | OUTPATIENT
Start: 2025-01-02 | End: 2025-02-01

## 2025-01-02 RX ORDER — LEMBOREXANT 10 MG/1
1 TABLET, FILM COATED ORAL NIGHTLY
Qty: 90 TABLET | Refills: 1 | Status: SHIPPED | OUTPATIENT
Start: 2025-01-02

## 2025-01-02 SDOH — SOCIAL DETERMINANTS OF HEALTH (SDOH): DISSAPEARANCE AND DEATH OF FAMILY MEMBER: Z63.4

## 2025-01-02 NOTE — PROGRESS NOTES
Subjective:       Patient ID: Tanya Espinoza is a 64 y.o. female.    Medication List with Changes/Refills   New Medications    ALPRAZOLAM (XANAX) 0.5 MG TABLET    Take 1 tablet (0.5 mg total) by mouth daily as needed for Anxiety.    FLUOXETINE 10 MG CAPSULE    Take 1 capsule (10 mg total) by mouth once daily.   Current Medications    ATORVASTATIN (LIPITOR) 20 MG TABLET    take 1 tablet (20 mg total) by mouth every evening.    CETIRIZINE (ZYRTEC) 10 MG TABLET    Take 1 tablet (10 mg total) by mouth nightly.    ESTRADIOL (ESTRACE) 0.01 % (0.1 MG/GRAM) VAGINAL CREAM    Place 1 fingertip of cream first at urethral opening and then external vagina every other day. Please do not use plastic applicator    FEXOFENADINE (ALLEGRA) 180 MG TABLET    Take 1 tablet (180 mg total) by mouth every morning.    FUROSEMIDE (LASIX) 40 MG TABLET    Take 40 mg by mouth as needed.    MULTIVITAMIN WITH MINERALS TABLET    Take 1 tablet by mouth once daily.    SACUBITRIL-VALSARTAN (ENTRESTO) 24-26 MG PER TABLET    Take 1 tablet by mouth 2 (two) times daily.    TRIAMCINOLONE ACETONIDE 0.1% (KENALOG) 0.1 % CREAM    Apply twice daily to affected areas of skin until improved, then can stop and restart as needed   Changed and/or Refilled Medications    Modified Medication Previous Medication    DAYVIGO 10 MG TAB DAYVIGO 10 mg Tab       Take 1 tablet by mouth every evening.    Take 1 tablet by mouth every evening.       Chief Complaint: Follow-up  She is here today to f/u on chronic medical issues.      She has Afib and is s/p RFA on 4/2021. She is followed by EP but has not been seen since 2/2022. At that time there was concern for use of eliquis with known cerebral aneurysm. She continues on aspirin for anticoagulation. She denies any palpitations. The question if she could safely take eliquis due to aneurysm has not been answered.      She has a NICMO after a severe episode of pneumonia with reduced EF of 40% measured on cardiac cath on  3/2024, non-obstructing cad, and HTN. Repeat echo on 7/2024 that showed PFO with fenestrated ASD with right to left shunt and EF 60%. Echo on 9/2024 showed EF 50%, no diastolic dysfunction. Stress echo on 10/2024 was positive for ischemia in the lateral leads.  She was seen by cardiology on 10/2024 who advised continued monitoring. She had a cath in 3/2024 showing non-obstructive coronary artery disease, unchanged compared to prior angiogram in 2021, mild to moderate left ventricular systolic dysfunction, The ejection fraction was calculated to be 40%, and left ventricular end diastolic pressure was elevated.   She continues on entresto 24-26 mg bid and lasix 40 mg as needed. She does have intermittent lower leg swelling and continues with dyspnea on exertion.  CTA on 7/2024 was negative for PE and showed resolution of the pneumonia.       She has hyperlipidemia and is taking atorvastatin 20 mg daily. Lipids on 4/2024 were 195/199/69/86.  She is on aspirin daily.       She has a saccular basilar tip aneurysm noted on MRA on 5/2021 (done for headaches and stroke like symptoms) that showed 3 mm aneurysm. Repeat MRA on 11/2023 was stable and advised by radiology to f/u in 2 years with repeat MRA.       She has fatty liver with u/s on 5/2024 showing hepatomegaly with fatty liver. LFTs on 11/2024 were normal.      She has GERD with h pylori gastritis treated with triple abx regimen on 5/2024 (clarithromycin, amoxicillin, flagyl and PPI).  She denies any active symptoms.     She has a history of migraines that she describes as throbbing on left side involving her eye with associated light sensitivity and nausea. She has some visual changes. She was followed by neurology and underwent a series of botox injection. She feels this improved her headaches and denies any recurrence.     She complains of over a year of migratory hives that are red, round and looks swollen. She will have lesions involving her UE, abdomen and legs.  No involvement of her face. These lesions improve with benadryl. They do itch.  No known triggers but worse in the heat. CT chest/abdomen on 7/2024 was negative for adenopathy or masses. Labs on 11/2024 show SAULO positive with titer of 1:80, ESR 22 and CRP 3.3. She was seen by dermatology on 11/2024 who feels she has compression urticaria.  She is taking allegra in the am and zyrtec in the pm with some improvement. Dermatology advised to consider biologics (dupixent or xolair) if hives persist.      She has atrophic vaginitis with recurrent UTIs. She was seen by urology on 10/2024 and started on topical estrogen cream and d-mannose. She denies any recent infections.     She has mild memory impairment and was seen by neurology on 8/2024.  She was started on dayvigo for sleep to help with memory issues. She has been out for at least a month and reports worsening sleep issues.     She reports loss of her daughter last week to drug overdose. She is very upset and depression. No suicidal ideations. She is crying and struggling with sleep.  She would like to be started on treatment and is willing to see a clinical counselor.       She lives with her  and feels safe at home. 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 She does not exercise. She does eat healthy.      Colonoscopy---8/2024 yesterday with Dr. Ordoñez   Mammogram----7/2024 neg   Pap-----9/2024 neg HPV neg  Tdap---8/2016  Influenza vaccine---refused    Pneumovax 23----7/2017  Prevnar 20----8/2024  Shingrex vaccine-----yes per patient   Covid vaccine---refused  RSV vaccine----none      Review of Systems   Constitutional:  Negative for appetite change, fatigue, fever and unexpected weight change.   HENT:  Negative for congestion, ear pain, hearing loss, sore throat and trouble swallowing.    Eyes:  Negative for pain and visual disturbance.   Respiratory:  Positive for shortness of breath and wheezing. Negative for cough and chest tightness.    Cardiovascular:  Negative for  "chest pain, palpitations and leg swelling.   Gastrointestinal:  Negative for abdominal pain, blood in stool, constipation, diarrhea, nausea and vomiting.   Endocrine: Negative for polyuria.   Genitourinary:  Negative for dysuria and hematuria.   Musculoskeletal:  Positive for back pain. Negative for arthralgias and myalgias.   Skin:  Negative for rash.   Neurological:  Positive for headaches. Negative for dizziness, weakness and numbness.   Hematological:  Does not bruise/bleed easily.   Psychiatric/Behavioral:  Positive for dysphoric mood and sleep disturbance. Negative for suicidal ideas. The patient is nervous/anxious.        Objective:      Vitals:    01/02/25 0732   BP: (!) 130/110   BP Location: Left arm   Patient Position: Sitting   Pulse: 95   Resp: 18   Temp: 98.4 °F (36.9 °C)   TempSrc: Temporal   SpO2: 97%   Weight: 104.6 kg (230 lb 11.4 oz)   Height: 5' 8" (1.727 m)     Body mass index is 35.08 kg/m².  Physical Exam    General appearance: No acute distress, cooperative, crying most the visit   Eyes: PERRL, EOMI, conjunctiva clear  Ears: normal external ear and pinna, tm clear without drainage, canals clear  Nose: Normal mucosa without drainage  Throat: no exudates or erythema, tonsils not enlarged  Mouth: no sores or lesions, moist mucous membranes  Neck: FROM, soft, supple, no thyromegaly, no bruits, no JVD  Lymph: no anterior or posterior cervical adenopathy  Heart::  Regular rate and rhythm, 3/6 systolic murmur  Lung: Clear to ascultation bilaterally, no wheezing, no rales, no rhonchi, no distress  Abdomen: Soft, nontender, no distention, no hepatosplenomegaly, bowel sounds normal, no guarding, no rebound, no peritoneal signs  Skin: no rashes, no lesions  Extremities: no edema, no cyanosis  Neuro: CN 2-12 intact, 5/5 muscle strength upper and lower extremity bilaterally, 2+ DTRs UE and LE bilaterally, normal gait  Peripheral pulses: 2+ pedal pulses bilaterally  Musculoskeletal: FROM, good strenth, no " tenderness  Joint: normal appearance, no swelling, no warmth, no deformity in all joints    Assessment:       1. Coronary artery disease involving native coronary artery of native heart without angina pectoris    2. NICM (nonischemic cardiomyopathy)    3. Chronic systolic heart failure    4. Paroxysmal atrial fibrillation    5. PFO (patent foramen ovale)    6. Primary hypertension    7. Hyperlipidemia, unspecified hyperlipidemia type    8. Nonruptured cerebral aneurysm    9. Chronic urticaria    10. Prediabetes    11. Gastroesophageal reflux disease with esophagitis without hemorrhage    12. Fatty liver    13. Migraine without aura and without status migrainosus, not intractable    14. Atrophic vaginitis    15. MCI (mild cognitive impairment)    16. Major depressive disorder, recurrent episode, moderate    17. Grief at loss of child    18. Primary insomnia    19. Class 1 obesity due to excess calories with serious comorbidity and body mass index (BMI) of 31.0 to 31.9 in adult    20. Medication management        Plan:       Coronary artery disease involving native coronary artery of native heart without angina pectoris  Stable and no active symptoms.     NICM (nonischemic cardiomyopathy) with chronic systolic heart failure  Well compensated on entresto.   -     SAULO Screen w/Reflex; Future; Expected date: 01/02/2025  -     Sedimentation rate; Future; Expected date: 01/02/2025  -     C-Reactive Protein; Future; Expected date: 01/02/2025    Paroxysmal atrial fibrillation  She is not on eliquis due to cerebral aneurysm. Continue to follow with cardiology. Continue on aspirin daily.     PFO (patent foramen ovale)  Seen on echo and asymptomatic. Continue to monitor with serial echos.     Primary hypertension  Uncontrolled but she is very upset due to the death of her daughter today. Advised to check BP at home.   -     CBC Auto Differential; Future; Expected date: 01/02/2025  -     Comprehensive Metabolic Panel; Future;  Expected date: 2025  -     Lipid Panel; Future; Expected date: 2025  -     TSH; Future; Expected date: 2025    Hyperlipidemia, unspecified hyperlipidemia type  Good control on atorvastatin    Nonruptured cerebral aneurysm  Continue to monitor. Due to recheck MRA on 2025    Chronic urticaria  Due to compression. Improved on bid antihistamines    Prediabetes  Mild and improved    Gastroesophageal reflux disease with esophagitis without hemorrhage  Well controlled and continue current regimen.     Fatty liver  Mild and encourage weight loss    Migraine without aura and without status migrainosus, not intractable  No complaints today    Atrophic vaginitis  Continue topical estrogen cream and d-mannose. No recent UTIs    MCI (mild cognitive impairment)  Followed by neurology.     Major depressive disorder, recurrent episode, moderate  Uncontrolled and will start fluoxetine 10 mg daily and recheck with me in 4 weeks. Referral to clinical counseling.   -     Ambulatory referral/consult to Primary Care Behavioral Health (Non-Opioids); Future; Expected date: 2025  -     FLUoxetine 10 MG capsule; Take 1 capsule (10 mg total) by mouth once daily.  Dispense: 30 capsule; Refill: 11    Grief at loss of child  Referral to counseling. Given small amount of xanax to help her with anxiety during the .   -     Ambulatory referral/consult to Primary Care Behavioral Health (Non-Opioids); Future; Expected date: 2025  -     ALPRAZolam (XANAX) 0.5 MG tablet; Take 1 tablet (0.5 mg total) by mouth daily as needed for Anxiety.  Dispense: 10 tablet; Refill: 0    Primary insomnia  Good control on dayvigo and given refill today.   -     DAYVIGO 10 mg Tab; Take 1 tablet by mouth every evening.  Dispense: 90 tablet; Refill: 1    Class 1 obesity due to excess calories with serious comorbidity and body mass index (BMI) of 31.0 to 31.9 in adult  Long discussion on the benefits of healthy eating and regular  exercise to help lose weight and help control cad, hypertension and hyperlipidemia.     Medication management  -     Hemoglobin A1C; Future; Expected date: 01/02/2025    Follow up in about 6 months (around 7/2/2025) for chronic medical issues and 4 weeks to recheck depression/anxiety/sleep .

## 2025-01-08 ENCOUNTER — PATIENT MESSAGE (OUTPATIENT)
Dept: PSYCHIATRY | Facility: CLINIC | Age: 65
End: 2025-01-08
Payer: COMMERCIAL

## 2025-01-14 ENCOUNTER — PATIENT MESSAGE (OUTPATIENT)
Dept: PSYCHIATRY | Facility: CLINIC | Age: 65
End: 2025-01-14
Payer: COMMERCIAL

## 2025-02-19 ENCOUNTER — TELEPHONE (OUTPATIENT)
Dept: CARDIOLOGY | Facility: CLINIC | Age: 65
End: 2025-02-19
Payer: COMMERCIAL

## 2025-04-09 ENCOUNTER — TELEPHONE (OUTPATIENT)
Dept: CARDIOLOGY | Facility: CLINIC | Age: 65
End: 2025-04-09
Payer: COMMERCIAL

## 2025-04-09 NOTE — TELEPHONE ENCOUNTER
Lvm for pt in regards to r/s appt on 4/21 due Dr. Ruvalcaba not being in clinic. Asked to call us back or reach out to us through the portal to discuss dates and times to r/s.

## 2025-04-10 ENCOUNTER — TELEPHONE (OUTPATIENT)
Dept: CARDIOLOGY | Facility: CLINIC | Age: 65
End: 2025-04-10
Payer: COMMERCIAL

## 2025-04-10 NOTE — TELEPHONE ENCOUNTER
Spoke to pt in regards to r/s appt on 4/21 due to Dr. Ruvalcaba not being in clinic. Offered pt appt w/ [facility] same day, pt accepted

## 2025-04-21 PROBLEM — R07.9 CHEST PAIN: Status: RESOLVED | Noted: 2021-04-24 | Resolved: 2025-04-21

## 2025-04-21 PROBLEM — I50.22 CHRONIC SYSTOLIC HEART FAILURE: Status: ACTIVE | Noted: 2024-03-20

## 2025-04-21 PROBLEM — R07.89 CHEST HEAVINESS: Status: RESOLVED | Noted: 2021-05-03 | Resolved: 2025-04-21

## 2025-04-21 PROBLEM — I49.9 IRREGULAR HEART BEATS: Status: RESOLVED | Noted: 2022-01-11 | Resolved: 2025-04-21

## 2025-04-21 PROBLEM — I51.9 LV DYSFUNCTION: Status: RESOLVED | Noted: 2024-03-19 | Resolved: 2025-04-21

## 2025-06-25 ENCOUNTER — LAB VISIT (OUTPATIENT)
Dept: LAB | Facility: HOSPITAL | Age: 65
End: 2025-06-25
Attending: INTERNAL MEDICINE
Payer: COMMERCIAL

## 2025-06-25 DIAGNOSIS — Z79.899 MEDICATION MANAGEMENT: ICD-10-CM

## 2025-06-25 DIAGNOSIS — I42.8 NICM (NONISCHEMIC CARDIOMYOPATHY): ICD-10-CM

## 2025-06-25 DIAGNOSIS — I10 PRIMARY HYPERTENSION: ICD-10-CM

## 2025-06-25 LAB
ABSOLUTE EOSINOPHIL (OHS): 0.32 K/UL
ABSOLUTE MONOCYTE (OHS): 0.4 K/UL (ref 0.3–1)
ABSOLUTE NEUTROPHIL COUNT (OHS): 3.68 K/UL (ref 1.8–7.7)
ALBUMIN SERPL BCP-MCNC: 3.8 G/DL (ref 3.5–5.2)
ALP SERPL-CCNC: 97 UNIT/L (ref 40–150)
ALT SERPL W/O P-5'-P-CCNC: 27 UNIT/L (ref 10–44)
ANION GAP (OHS): 12 MMOL/L (ref 8–16)
AST SERPL-CCNC: 24 UNIT/L (ref 11–45)
BASOPHILS # BLD AUTO: 0.07 K/UL
BASOPHILS NFR BLD AUTO: 1.1 %
BILIRUB SERPL-MCNC: 0.9 MG/DL (ref 0.1–1)
BUN SERPL-MCNC: 15 MG/DL (ref 8–23)
CALCIUM SERPL-MCNC: 9.7 MG/DL (ref 8.7–10.5)
CHLORIDE SERPL-SCNC: 106 MMOL/L (ref 95–110)
CHOLEST SERPL-MCNC: 299 MG/DL (ref 120–199)
CHOLEST/HDLC SERPL: 6.2 {RATIO} (ref 2–5)
CO2 SERPL-SCNC: 24 MMOL/L (ref 23–29)
CREAT SERPL-MCNC: 1 MG/DL (ref 0.5–1.4)
CRP SERPL-MCNC: 5.2 MG/L
EAG (OHS): 94 MG/DL (ref 68–131)
ERYTHROCYTE [DISTWIDTH] IN BLOOD BY AUTOMATED COUNT: 12.1 % (ref 11.5–14.5)
ERYTHROCYTE [SEDIMENTATION RATE] IN BLOOD BY PHOTOMETRIC METHOD: 16 MM/HR
GFR SERPLBLD CREATININE-BSD FMLA CKD-EPI: >60 ML/MIN/1.73/M2
GLUCOSE SERPL-MCNC: 119 MG/DL (ref 70–110)
HBA1C MFR BLD: 4.9 % (ref 4–5.6)
HCT VFR BLD AUTO: 44.8 % (ref 37–48.5)
HDLC SERPL-MCNC: 48 MG/DL (ref 40–75)
HDLC SERPL: 16.1 % (ref 20–50)
HGB BLD-MCNC: 15 GM/DL (ref 12–16)
IMM GRANULOCYTES # BLD AUTO: 0.01 K/UL (ref 0–0.04)
IMM GRANULOCYTES NFR BLD AUTO: 0.2 % (ref 0–0.5)
LDLC SERPL CALC-MCNC: 203 MG/DL (ref 63–159)
LYMPHOCYTES # BLD AUTO: 1.83 K/UL (ref 1–4.8)
MCH RBC QN AUTO: 28.9 PG (ref 27–31)
MCHC RBC AUTO-ENTMCNC: 33.5 G/DL (ref 32–36)
MCV RBC AUTO: 86 FL (ref 82–98)
NONHDLC SERPL-MCNC: 251 MG/DL
NUCLEATED RBC (/100WBC) (OHS): 0 /100 WBC
PLATELET # BLD AUTO: 307 K/UL (ref 150–450)
PMV BLD AUTO: 12.1 FL (ref 9.2–12.9)
POTASSIUM SERPL-SCNC: 4.2 MMOL/L (ref 3.5–5.1)
PROT SERPL-MCNC: 7.8 GM/DL (ref 6–8.4)
RBC # BLD AUTO: 5.19 M/UL (ref 4–5.4)
RELATIVE EOSINOPHIL (OHS): 5.1 %
RELATIVE LYMPHOCYTE (OHS): 29 % (ref 18–48)
RELATIVE MONOCYTE (OHS): 6.3 % (ref 4–15)
RELATIVE NEUTROPHIL (OHS): 58.3 % (ref 38–73)
SODIUM SERPL-SCNC: 142 MMOL/L (ref 136–145)
TRIGL SERPL-MCNC: 240 MG/DL (ref 30–150)
TSH SERPL-ACNC: 3.42 UIU/ML (ref 0.4–4)
WBC # BLD AUTO: 6.31 K/UL (ref 3.9–12.7)

## 2025-06-25 PROCEDURE — 85652 RBC SED RATE AUTOMATED: CPT

## 2025-06-25 PROCEDURE — 86235 NUCLEAR ANTIGEN ANTIBODY: CPT | Mod: 59

## 2025-06-25 PROCEDURE — 86235 NUCLEAR ANTIGEN ANTIBODY: CPT

## 2025-06-25 PROCEDURE — 84443 ASSAY THYROID STIM HORMONE: CPT

## 2025-06-25 PROCEDURE — 83718 ASSAY OF LIPOPROTEIN: CPT

## 2025-06-25 PROCEDURE — 86039 ANTINUCLEAR ANTIBODIES (ANA): CPT

## 2025-06-25 PROCEDURE — 36415 COLL VENOUS BLD VENIPUNCTURE: CPT | Mod: PO

## 2025-06-25 PROCEDURE — 86140 C-REACTIVE PROTEIN: CPT

## 2025-06-25 PROCEDURE — 80053 COMPREHEN METABOLIC PANEL: CPT

## 2025-06-25 PROCEDURE — 86225 DNA ANTIBODY NATIVE: CPT

## 2025-06-25 PROCEDURE — 83036 HEMOGLOBIN GLYCOSYLATED A1C: CPT

## 2025-06-25 PROCEDURE — 85025 COMPLETE CBC W/AUTO DIFF WBC: CPT

## 2025-06-26 ENCOUNTER — PATIENT MESSAGE (OUTPATIENT)
Dept: FAMILY MEDICINE | Facility: CLINIC | Age: 65
End: 2025-06-26
Payer: COMMERCIAL

## 2025-06-26 LAB
ANA (OHS): POSITIVE
ANA PATTERN 1 (OHS): ABNORMAL
ANA TITER 1 (OHS): ABNORMAL

## 2025-06-27 LAB
DSDNA ANTIBODY (OHS): NORMAL
DSDNA ANTIBODY TITER (OHS): NORMAL
SM  ANTIBODY (OHS): 0.1 RATIO
SM INTERPRETATION (OHS): NEGATIVE
SM/RNP ANTIBODY (OHS): 0.07 RATIO
SM/RNP INTERPRETATION (OHS): NEGATIVE
SSA  ANTIBODY (OHS): 0.08 RATIO (ref 0–0.99)
SSA INTERPRETATION (OHS): NEGATIVE
SSB  ANTIBODY (OHS): 0.07 RATIO
SSB INTERPRETATION (OHS): NEGATIVE

## 2025-06-27 NOTE — PROGRESS NOTES
Subjective:    Patient ID:  Tanya Espinoza is a 64 y.o. female patient here for evaluation Follow-up (echo)      History of Present Illness:  Nonischemic dilated cardiomyopathy.  EF has been recorded as low as 30%.  Mostly in the low normal, patient was on goal-directed medical management.  Last left heart catheterization with nonobstructive coronary artery disease 03/2024.    History of PAF, ablation x2, last about 5 years ago.    Of recent goal-directed medical management withdrawn.    Problems include exaggerated heart rate with minimal exercise.  Exertional dyspnea.  No PND orthopnea.  No edema.  Occasional atypical chest pain             Review of patient's allergies indicates:   Allergen Reactions    Adhesive Rash    Effexor [venlafaxine] Other (See Comments)     Bruising all over body and elevated blood pressure       Past Medical History:   Diagnosis Date    ADHD (attention deficit hyperactivity disorder)     Anemia     Anticoagulant long-term use     Anxiety     CHF (congestive heart failure)     Coronary artery disease     CAD    Elevated LFTs     elevated enzymes    GERD (gastroesophageal reflux disease) 04/24/2021    Headache     Hepatomegaly     Hyperlipemia 04/24/2021    Hypertension     LBBB (left bundle branch block)     Lower extremity edema     Major depressive disorder, recurrent episode, mild 01/03/2022    Obesity     Paroxysmal A-fib     Skin cancer     skin ca     Past Surgical History:   Procedure Laterality Date    ABLATION  04/25/2021    to heart for a-fib x 2    COLONOSCOPY  08/07/2024    CORONARY ANGIOGRAPHY N/A 05/06/2021    Procedure: ANGIOGRAM, CORONARY ARTERY;  Surgeon: Jesus Sol MD;  Location: [facility];  Service: Cardiology;  Laterality: N/A;    LEFT HEART CATHETERIZATION N/A 05/06/2021    Procedure: Left heart cath;  Surgeon: Jesus Sol MD;  Location: [facility];  Service: Cardiology;  Laterality: N/A;    LEFT HEART CATHETERIZATION  03/21/2024    Procedure: Left heart  cath;  Surgeon: Jorge Hinkle MD;  Location: [facility];  Service: Cardiology;;    TONSILLECTOMY      TUBAL LIGATION       Social History     Tobacco Use    Smoking status: Former     Current packs/day: 0.00     Average packs/day: 1 pack/day for 40.0 years (40.0 ttl pk-yrs)     Types: Cigarettes     Quit date: 2023     Years since quittin.3    Smokeless tobacco: Never    Tobacco comments:     down to 0.25 pack of  cigarettes per day   Substance Use Topics    Alcohol use: Not Currently    Drug use: No        Review of Systems:    As noted in HPI in addition      REVIEW OF SYSTEMS  Review of Systems   Constitutional: Positive for malaise/fatigue and weight gain. Negative for decreased appetite, diaphoresis and night sweats.   HENT:  Negative for nosebleeds and odynophagia.    Eyes:  Negative for double vision and photophobia.   Cardiovascular:  Positive for dyspnea on exertion and irregular heartbeat. Negative for chest pain, claudication, cyanosis, leg swelling, near-syncope, orthopnea, palpitations, paroxysmal nocturnal dyspnea and syncope.   Respiratory:  Positive for shortness of breath. Negative for cough, hemoptysis and wheezing.    Hematologic/Lymphatic: Negative for adenopathy.   Skin:  Negative for flushing, skin cancer and suspicious lesions.   Musculoskeletal:  Negative for gout, myalgias and neck pain.   Gastrointestinal:  Negative for abdominal pain, heartburn, hematemesis and hematochezia.   Genitourinary:  Negative for bladder incontinence, hesitancy and nocturia.   Neurological:  Negative for focal weakness, headaches, light-headedness and paresthesias.   Psychiatric/Behavioral:  Negative for memory loss and substance abuse.               Objective:        Vitals:    10/02/24 1447   BP: 138/80   Pulse: 90       Lab Results   Component Value Date    WBC 8.36 2024    HGB 12.9 2024    HCT 40.9 2024     2024    CHOL 195 2024    TRIG 199 (H) 2024     HDL 69 04/01/2024    ALT 25 07/30/2024    AST 21 07/30/2024     07/30/2024    K 4.7 07/30/2024     07/30/2024    CREATININE 0.9 07/30/2024    BUN 14 07/30/2024    CO2 29 07/30/2024    TSH 3.530 03/18/2024    INR 1.0 05/14/2021    HGBA1C 5.1 07/30/2024        ECHOCARDIOGRAM RESULTS  Results for orders placed during the hospital encounter of 09/20/24    Echo    Interpretation Summary    Left Ventricle: The left ventricle is normal in size. Normal wall thickness. There is low normal systolic function with a visually estimated ejection fraction of 50 - 55%. Grade I diastolic dysfunction.    Right Ventricle: Normal right ventricular cavity size. Wall thickness is normal. Systolic function is normal.    Pulmonary Artery: No pulmonary hypertension. The estimated pulmonary artery systolic pressure is 22 mmHg.    IVC/SVC: Normal venous pressure at 3 mmHg.    Results for orders placed during the hospital encounter of 03/17/24    Cardiac catheterization    Conclusion    There was non-obstructive coronary artery disease, unchanged compared to prior angiogram in 2021.    There was mild to moderate left ventricular systolic dysfunction.    The ejection fraction was calculated to be 40%.    The left ventricular end diastolic pressure was elevated.    The procedure log was documented by Documenter: Tomer Cortez RN and verified by Jorge Hinkle MD.    Date: 3/21/2024  Time: 10:05 AM          CURRENT/PREVIOUS VISIT EKG  Results for orders placed or performed in visit on 04/26/24   EKG 12-lead    Collection Time: 04/26/24 12:38 PM   Result Value Ref Range    QRS Duration 122 ms    [facility] QTC Calculation 435 ms    Narrative    Test Reason :     Vent. Rate : 079 BPM     Atrial Rate : 079 BPM     P-R Int : 172 ms          QRS Dur : 122 ms      QT Int : 380 ms       P-R-T Axes : 051 020 186 degrees     QTc Int : 435 ms    Normal sinus rhythm  Left bundle branch block  Abnormal ECG  When compared with ECG of  no constipation, no diarrhea, no heartburn  Female Reproductive:  No hot flashes, no abnormal vaginal discharge, no pain with menstruation, no pelvic pain  Musculoskeletal:  No joint pain, no leg cramps, no back pain, no muscle aches  Respiratory:  No shortness of breath, no orthopnea, no wheezing, no MCDANIEL, no hemoptysis  Urology:  No blood in the urine, no urinary frequency, no urinary incontinence, no urinary urgency, no nocturia, no dysuria  Neurology:  No numbness/tingling, no dizziness, no weakness  Psychology:  No depression, no sleep disturbances, no suicidal ideation, no anxiety    Physical Exam:  Vitals:    06/27/25 1519   BP: 126/78   BP Site: Left Upper Arm   Patient Position: Sitting   BP Cuff Size: Medium Adult   Pulse: (!) 112   Resp: 16   Temp: 97.4 °F (36.3 °C)   TempSrc: Temporal   SpO2: 99%   Weight: 105.3 kg (232 lb 3.2 oz)   Height: 1.702 m (5' 7\")     General:  Patient alert and oriented x 3, NAD, pleasant  HEENT:  Atraumatic, normocephalic, PERRLA, EOMI, clear conjunctiva, TMs clear, nose-clear, throat - no erythema, tonsils- wnl  Neck:  Supple, no goiter, no carotid bruits, no lymphadenopathy  Lungs:  CTA B  Heart:  RRR, no murmurs, gallops or rubs  Abdomen:  Soft, NTND, + bowel sounds  Back: full ROM, no CVA tenderness  Extremities:  No clubbing, cyanosis or edema  Neuro:  CN II-XII grossly intact, 5/5 strength in bilateral upper and lower extremities, 2 + reflexes.    Skin: unremarkable    Assessment/Plan:  Gayla \"Gayla Barker" was seen today for discuss labs, cough, medication problem and annual exam.    Diagnoses and all orders for this visit:    Annual physical exam    Controlled type 2 diabetes mellitus without complication, with long-term current use of insulin (HCC)  -     LANTUS SOLOSTAR 100 UNIT/ML injection pen; Inject 30 Units into the skin at bedtime  -     Comprehensive Metabolic Panel; Future  -     CBC; Future  -     Lipid Panel; Future  -     Hemoglobin A1C;  17-APR-2024 13:26,  Left bundle branch block is now Present  Criteria for Anterior infarct are no longer Present  Confirmed by Jesus Sol MD ([id number]) on 4/29/2024 11:47:00 AM    Referred By:  ZIYAD CORRIGAN           Confirmed By:Jesus Sol MD     No valid procedures specified.   Results for orders placed during the hospital encounter of 04/24/21    Nuclear Stress - Cardiology Interpreted    Interpretation Summary    There is a mild intensity, small sized, reversible defect that is consistent with ischemia in the anterobasilar wall(s).  Low risk scan    The gated perfusion images showed an ejection fraction of 52% at rest.    The EKG portion of this study is uninterpretable.    There were no arrhythmias during stress.    No valid procedures specified.    PHYSICAL EXAM  GENERAL: well built, well nourished, well-developed in no apparent distress alert and oriented.   HEENT: Normocephalic. Pupils normal and conjunctivae normal.  Mucous membranes normal, no cyanosis or icterus, trachea central,no pallor or icterus is noted..   NECK: No JVD. No bruit..   THYROID: Thyroid not enlarged. No nodules present..   CARDIAC:  Normal S1-S2.  No murmur rub click or gallop.  PMI nondisplaced.  CHEST ANATOMY: normal.   LUNGS: Clear to auscultation. No wheezing or rhonchi..   ABDOMEN: Soft no masses or organomegaly.  No abdomen pulsations or bruits.  Normal bowel sounds. No pulsations and no masses felt, No guarding or rebound.   URINARY: No carlson catheter   EXTREMITIES: No cyanosis, clubbing or edema noted at this time., no calf tenderness bilaterally.   PERIPHERAL VASCULAR SYSTEM: Good palpable distal pulses.  2+ femoral, popliteal and pedal pulses.  No bruits    CENTRAL NERVOUS SYSTEM: No focal motor or sensory deficits noted.   SKIN: Skin without lesions, moist, well perfused.   MUSCLE STRENGTH & TONE: No noteable weakness, atrophy or abnormal movement    I HAVE REVIEWED :    The vital signs, nurses notes, and all the pertinent radiology  and labs.         Current Outpatient Medications   Medication Instructions    atorvastatin (LIPITOR) 20 mg, Oral, Nightly    digoxin (LANOXIN) 125 mcg, Oral, Daily    lemborexant 10 mg, Oral, Nightly    metoprolol succinate (TOPROL-XL) 12.5 mg, Oral, Daily    multivitamin with minerals tablet 1 tablet, Daily    sacubitriL-valsartan (ENTRESTO) 24-26 mg per tablet 1 tablet, Oral, 2 times daily          Assessment:   PAF, ablation x2, most recent 5 years ago.  Remains in normal sinus rhythm.  Nonischemic dilated cardiomyopathy.  Recent echo EF normal.  Grade 1 diastolic dysfunction left heart catheterization 03/2024 with nonobstructive CAD    Baseline EKG with right bundle-branch block, normal sinus rhythm.  Recent Holter monitor about 4 months ago normal by history.  No records    Dyslipidemia.  Has been on statin agents in the past    Plan:   Restart metoprolol, Entresto.  Continue Lasix as needed.  Schedule stress echo.   Consider ILR.  Follow up results.    Obtain records from [place] [facility]        No follow-ups on file.

## 2025-07-02 ENCOUNTER — OFFICE VISIT (OUTPATIENT)
Dept: FAMILY MEDICINE | Facility: CLINIC | Age: 65
End: 2025-07-02
Payer: COMMERCIAL

## 2025-07-02 VITALS
OXYGEN SATURATION: 97 % | WEIGHT: 213.75 LBS | BODY MASS INDEX: 32.4 KG/M2 | SYSTOLIC BLOOD PRESSURE: 118 MMHG | HEIGHT: 68 IN | DIASTOLIC BLOOD PRESSURE: 82 MMHG | HEART RATE: 102 BPM | RESPIRATION RATE: 20 BRPM | TEMPERATURE: 98 F

## 2025-07-02 DIAGNOSIS — G43.009 MIGRAINE WITHOUT AURA AND WITHOUT STATUS MIGRAINOSUS, NOT INTRACTABLE: ICD-10-CM

## 2025-07-02 DIAGNOSIS — I25.10 CORONARY ARTERY DISEASE INVOLVING NATIVE CORONARY ARTERY OF NATIVE HEART WITHOUT ANGINA PECTORIS: Primary | ICD-10-CM

## 2025-07-02 DIAGNOSIS — I42.8 NICM (NONISCHEMIC CARDIOMYOPATHY): ICD-10-CM

## 2025-07-02 DIAGNOSIS — R73.03 PREDIABETES: ICD-10-CM

## 2025-07-02 DIAGNOSIS — G31.84 MCI (MILD COGNITIVE IMPAIRMENT): ICD-10-CM

## 2025-07-02 DIAGNOSIS — F51.01 PRIMARY INSOMNIA: ICD-10-CM

## 2025-07-02 DIAGNOSIS — I67.1 NONRUPTURED CEREBRAL ANEURYSM: ICD-10-CM

## 2025-07-02 DIAGNOSIS — I10 PRIMARY HYPERTENSION: ICD-10-CM

## 2025-07-02 DIAGNOSIS — E66.811 CLASS 1 OBESITY DUE TO EXCESS CALORIES WITH SERIOUS COMORBIDITY AND BODY MASS INDEX (BMI) OF 31.0 TO 31.9 IN ADULT: ICD-10-CM

## 2025-07-02 DIAGNOSIS — Q21.12 PFO (PATENT FORAMEN OVALE): ICD-10-CM

## 2025-07-02 DIAGNOSIS — Z12.31 ENCOUNTER FOR SCREENING MAMMOGRAM FOR MALIGNANT NEOPLASM OF BREAST: ICD-10-CM

## 2025-07-02 DIAGNOSIS — K21.00 GASTROESOPHAGEAL REFLUX DISEASE WITH ESOPHAGITIS WITHOUT HEMORRHAGE: ICD-10-CM

## 2025-07-02 DIAGNOSIS — E66.09 CLASS 1 OBESITY DUE TO EXCESS CALORIES WITH SERIOUS COMORBIDITY AND BODY MASS INDEX (BMI) OF 31.0 TO 31.9 IN ADULT: ICD-10-CM

## 2025-07-02 DIAGNOSIS — F33.1 MAJOR DEPRESSIVE DISORDER, RECURRENT EPISODE, MODERATE: ICD-10-CM

## 2025-07-02 DIAGNOSIS — I48.0 PAROXYSMAL ATRIAL FIBRILLATION: ICD-10-CM

## 2025-07-02 DIAGNOSIS — K76.0 FATTY LIVER: ICD-10-CM

## 2025-07-02 DIAGNOSIS — N95.2 ATROPHIC VAGINITIS: ICD-10-CM

## 2025-07-02 DIAGNOSIS — Z87.891 PERSONAL HISTORY OF TOBACCO USE: ICD-10-CM

## 2025-07-02 DIAGNOSIS — E78.5 HYPERLIPIDEMIA, UNSPECIFIED HYPERLIPIDEMIA TYPE: ICD-10-CM

## 2025-07-02 DIAGNOSIS — I50.22 CHRONIC SYSTOLIC HEART FAILURE: ICD-10-CM

## 2025-07-02 DIAGNOSIS — L50.8 CHRONIC URTICARIA: ICD-10-CM

## 2025-07-02 RX ORDER — FLUOXETINE 20 MG/1
20 CAPSULE ORAL DAILY
Qty: 30 CAPSULE | Refills: 11 | Status: SHIPPED | OUTPATIENT
Start: 2025-07-02 | End: 2026-07-02

## 2025-07-02 RX ORDER — ATORVASTATIN CALCIUM 20 MG/1
20 TABLET, FILM COATED ORAL NIGHTLY
Qty: 90 TABLET | Refills: 3 | Status: SHIPPED | OUTPATIENT
Start: 2025-07-02 | End: 2026-07-02

## 2025-07-02 RX ORDER — ASPIRIN 81 MG/1
81 TABLET ORAL DAILY
Qty: 90 TABLET | Refills: 2 | Status: SHIPPED | OUTPATIENT
Start: 2025-07-02 | End: 2026-07-02

## 2025-07-02 NOTE — PROGRESS NOTES
Subjective:       Patient ID: Tanya Espinoza is a 65 y.o. female.    Medication List with Changes/Refills   New Medications    ASPIRIN (ECOTRIN) 81 MG EC TABLET    Take 1 tablet (81 mg total) by mouth once daily.    FLUOXETINE 20 MG CAPSULE    Take 1 capsule (20 mg total) by mouth once daily.   Current Medications    ALPRAZOLAM (XANAX) 0.5 MG TABLET    Take 1 tablet (0.5 mg total) by mouth daily as needed for Anxiety.    CETIRIZINE (ZYRTEC) 10 MG TABLET    Take 1 tablet (10 mg total) by mouth nightly.    DAYVIGO 10 MG TAB    Take 1 tablet by mouth every evening.    ESTRADIOL (ESTRACE) 0.01 % (0.1 MG/GRAM) VAGINAL CREAM    Place 1 fingertip of cream first at urethral opening and then external vagina every other day. Please do not use plastic applicator    FEXOFENADINE (ALLEGRA) 180 MG TABLET    Take 1 tablet (180 mg total) by mouth every morning.    FUROSEMIDE (LASIX) 40 MG TABLET    Take 40 mg by mouth as needed.    MULTIVITAMIN WITH MINERALS TABLET    Take 1 tablet by mouth once daily.    TRIAMCINOLONE ACETONIDE 0.1% (KENALOG) 0.1 % CREAM    Apply twice daily to affected areas of skin until improved, then can stop and restart as needed   Changed and/or Refilled Medications    Modified Medication Previous Medication    ATORVASTATIN (LIPITOR) 20 MG TABLET atorvastatin (LIPITOR) 20 MG tablet       Take 1 tablet (20 mg total) by mouth every evening.    take 1 tablet (20 mg total) by mouth every evening.   Discontinued Medications    FLUOXETINE 10 MG CAPSULE    Take 1 capsule (10 mg total) by mouth once daily.    SACUBITRIL-VALSARTAN (ENTRESTO) 24-26 MG PER TABLET    Take 1 tablet by mouth 2 (two) times daily.       Chief Complaint: Follow-up (4wk follow up )  She is here today to f/u on chronic medical issues.      She has Afib and is s/p RFA on 4/2021. She is followed by EP but has not been seen since 2/2022. At that time there was concern for use of eliquis with known cerebral aneurysm. Cardiology stopped her  aspirin and eliquis. She denies any palpitations.      She has a NICMO after a severe episode of pneumonia with reduced EF of 40% measured on cardiac cath on 3/2024, non-obstructing cad, and HTN. Repeat echo on 7/2024 that showed PFO with fenestrated ASD with right to left shunt and EF 60%. Echo on 9/2024 showed EF 50%, no diastolic dysfunction. Stress echo on 10/2024 was positive for ischemia in the lateral leads.  She was seen by cardiology on 10/2024 who advised continued monitoring. She had a cath in 3/2024 showing non-obstructive coronary artery disease, unchanged compared to prior angiogram in 2021, mild to moderate left ventricular systolic dysfunction, The ejection fraction was calculated to be 40%, and left ventricular end diastolic pressure was elevated.   She was taking entresto 24-26 mg bid  but this was stopped by cardiology. She uses lasix 40 mg as needed. She does have intermittent lower leg swelling and continues with dyspnea on exertion.  CTA on 7/2024 was negative for PE and showed resolution of the pneumonia.  She is scheduled to f/u with a new cardiologist.      She has hyperlipidemia but stopped taking atorvastatin 20 mg daily. Lipids off treatment on 6/2025 were 299/240/48/203.      She has a history of tobacco abuse and quit in 2021 with a 30 pack year history. CTA chest on 7/2024 was negative.      She has a saccular basilar tip aneurysm noted on MRA on 5/2021 (done for headaches and stroke like symptoms) that showed 3 mm aneurysm. Repeat MRA on 11/2023 was stable and advised by radiology to f/u in 2 years with repeat MRA.       She has fatty liver with u/s on 5/2024 showing hepatomegaly with fatty liver. LFTs on 6/2025 were normal.      She has GERD with h pylori gastritis treated with triple abx regimen on 5/2024 (clarithromycin, amoxicillin, flagyl and PPI).  She denies any active symptoms.     She has a history of migraines that she describes as throbbing on left side involving her eye  with associated light sensitivity and nausea. She has some visual changes. She was followed by neurology and underwent a series of botox injection. She feels this improved her headaches and is having about one mild headache a month.     She continues with migratory hives that are red, round and looks swollen. She will have lesions involving her UE, abdomen and legs. No involvement of her face. These lesions improve with benadryl. They do itch.  No known triggers but worse in the heat. CT chest/abdomen on 7/2024 was negative for adenopathy or masses. Labs on 11/2024 show SAULO positive with titer of 1:80, ESR 22 and CRP 3.3. She was seen by dermatology on 11/2024 who feels she has compression urticaria.  She is taking allegra in the am and zyrtec in the pm with some improvement. Dermatology advised to consider biologics (dupixent or xolair) if hives persist.  Repeat labs on 6/2025 show SAULO positive with titer 1:320, ESR and CRP normal and autoimmune workup negative.     She has atrophic vaginitis with recurrent UTIs. She was seen by urology on 10/2024 and started on topical estrogen cream (she uses a couple times a month) and d-mannose. She denies any recent infections.     She has mild memory impairment and was seen by neurology on 8/2024.  She was started on dayvigo for sleep to help with memory issues. She feels she is doing well on dayvigo 10 mg daily.     She continues to report depression since the loss of her daughter last to a drug overdose. She is very upset and depression. No suicidal ideations. She is crying and struggling with sleep.  She was started on fluoxetine 10 mg daily and would like to increase her dose. No anxiety or panic attacks. She is sleeping well.       She lives with her  and feels safe at home. 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 She does not exercise. She does eat healthy.      Colonoscopy---8/2024   Mammogram----7/2024 neg   Pap-----9/2024 neg HPV neg  Tdap---8/2016  Influenza  "vaccine---refused    Pneumovax 23----7/2017  Prevnar 20----8/2024  Shingrex vaccine-----yes per patient   Covid vaccine---refused  RSV vaccine----none      Review of Systems   Constitutional:  Positive for fatigue. Negative for appetite change, fever and unexpected weight change.   HENT:  Negative for congestion, ear pain, hearing loss, sore throat and trouble swallowing.    Eyes:  Negative for pain and visual disturbance.   Respiratory:  Positive for shortness of breath. Negative for cough, chest tightness and wheezing.    Cardiovascular:  Negative for chest pain, palpitations and leg swelling.   Gastrointestinal:  Positive for constipation and diarrhea. Negative for abdominal pain, blood in stool, nausea and vomiting.   Endocrine: Negative for polyuria.   Genitourinary:  Negative for difficulty urinating, dysuria and hematuria.   Musculoskeletal:  Positive for back pain. Negative for arthralgias and myalgias.   Skin:  Negative for rash.   Neurological:  Positive for headaches. Negative for dizziness, weakness and numbness.   Hematological:  Does not bruise/bleed easily.   Psychiatric/Behavioral:  Positive for dysphoric mood. Negative for sleep disturbance and suicidal ideas. The patient is nervous/anxious.        Objective:      Vitals:    07/02/25 1025   BP: 118/82   BP Location: Left arm   Patient Position: Sitting   Pulse: 102   Resp: 20   Temp: 98.1 °F (36.7 °C)   TempSrc: Temporal   SpO2: 97%   Weight: 97 kg (213 lb 11.8 oz)   Height: 5' 8" (1.727 m)     Body mass index is 32.5 kg/m².  Physical Exam    General appearance: No acute distress, cooperative, crying at times   Eyes: PERRL, EOMI, conjunctiva clear  Ears: normal external ear and pinna, tm clear without drainage, canals clear  Nose: Normal mucosa without drainage  Throat: no exudates or erythema, tonsils not enlarged  Mouth: no sores or lesions, moist mucous membranes  Neck: FROM, soft, supple, no thyromegaly, no bruits  Lymph: no anterior or " posterior cervical adenopathy  Heart::  Regular rate and rhythm, no murmur  Lung: Clear to ascultation bilaterally, no wheezing, no rales, no rhonchi, no distress  Abdomen: Soft, nontender, no distention, no hepatosplenomegaly, bowel sounds normal, no guarding, no rebound, no peritoneal signs  Skin: no rashes, no lesions  Extremities: no edema, no cyanosis  Neuro: CN 2-12 intact, 5/5 muscle strength upper and lower extremity bilaterally, 2+ DTRs UE and LE bilaterally, normal gait  Peripheral pulses: 2+ pedal pulses bilaterally, good perfusion and color  Musculoskeletal: FROM, good strenth, no tenderness  Joint: normal appearance, no swelling, no warmth, no deformity in all joints    Assessment:       1. Coronary artery disease involving native coronary artery of native heart without angina pectoris    2. NICM (nonischemic cardiomyopathy)    3. Chronic systolic heart failure    4. Paroxysmal atrial fibrillation    5. PFO (patent foramen ovale)    6. Primary hypertension    7. Hyperlipidemia, unspecified hyperlipidemia type    8. Nonruptured cerebral aneurysm    9. Personal history of tobacco use    10. Chronic urticaria    11. Prediabetes    12. Gastroesophageal reflux disease with esophagitis without hemorrhage    13. Fatty liver    14. Migraine without aura and without status migrainosus, not intractable    15. Atrophic vaginitis    16. MCI (mild cognitive impairment)    17. Major depressive disorder, recurrent episode, moderate    18. Primary insomnia    19. Class 1 obesity due to excess calories with serious comorbidity and body mass index (BMI) of 31.0 to 31.9 in adult    20. Encounter for screening mammogram for malignant neoplasm of breast        Plan:       Coronary artery disease involving native coronary artery of native heart without angina pectoris  STable and no active symptoms. She is to establish with a new cardiologist at [facility].   -     [facility] Auto Differential; Future; Expected date: 07/02/2025  -      Comprehensive Metabolic Panel; Future; Expected date: 07/02/2025  -     Lipid Panel; Future; Expected date: 07/02/2025  -     TSH; Future; Expected date: 07/02/2025    NICM (nonischemic cardiomyopathy)/Chronic systolic heart failure  Repeat echo on 10/2024 looked better. No active symptoms.  SHe is off entresto. Continue to monitor.     Paroxysmal atrial fibrillation  NSR on exam today. She is not on anticoagulation per patient due to cardiology.  ADvised to restart her aspirin    PFO (patent foramen ovale)  Asymptomatic. Follow with serial echos    Primary hypertension  Well controlled and continue current regimen.     Hyperlipidemia, unspecified hyperlipidemia type  Uncontrolled and advised to restart atorvastatin 20 mg daily. Restart aspirin  -     atorvastatin (LIPITOR) 20 MG tablet; Take 1 tablet (20 mg total) by mouth every evening.  Dispense: 90 tablet; Refill: 3    Nonruptured cerebral aneurysm  Due to recheck MRI of the brain on 11/2025.     Personal history of tobacco use  Patient was seen today for counseling on low dose CT of lungs for lung cancer screening.  She/He has been smoking for 30pack years and is between the ages of 55 to 79 years old.  She/he is either a current smoker or former smoker who quit less than 15 years ago and is asymptomatic.  Through shared decision making, we discussed the risk vs benefits of screening including over diagnosis, need for follow up imaging, false positives, and total radiation exposure. Importance of adherence to annual lung cancer screening and impact of comorbidities on screening were discussed.  Counseling on the importance of maintaining cigarette smoking abstinence if a former smoker or importance of smoking cessation if a current smoker was addressed.    -     CT Chest Lung Screening Low Dose; Future; Expected date: 07/02/2025    Chronic urticaria  SAULO continues to be positive but no clear source. Continue to follow with dermatology and continue on  antihistamines.   -     SAULO Screen w/Reflex; Future; Expected date: 07/02/2025    Prediabetes  Mild and continue to monitor    Gastroesophageal reflux disease with esophagitis without hemorrhage  No recurrence    Fatty liver  Normal LFTs and encourage weight loss    Migraine without aura and without status migrainosus, not intractable  Improved after botox    Atrophic vaginitis  Continue topical estrogen    MCI (mild cognitive impairment)  Continue to monitor and worsened by uncontrolled depression.     Major depressive disorder, recurrent episode, moderate  Uncontrolled and will increase her dose of fluoxetine to 20 mg daily. Recheck with me in 4 weeks.   -     FLUoxetine 20 MG capsule; Take 1 capsule (20 mg total) by mouth once daily.  Dispense: 30 capsule; Refill: 11    Primary insomnia  Controlled on dayvigo    Class 1 obesity due to excess calories with serious comorbidity and body mass index (BMI) of 31.0 to 31.9 in adult  Long discussion on the benefits of healthy eating and regular exercise to help lose weight and help control cad, hyperlipidemia and fatty liver.     Encounter for screening mammogram for malignant neoplasm of breast  -     Mammo Digital Screening Bilat w/ Sean (XPD); Future; Expected date: 07/02/2025    Follow up in about 4 weeks (around 7/30/2025) for recheck depression and 6 months chronic care.

## 2025-07-24 ENCOUNTER — HOSPITAL ENCOUNTER (OUTPATIENT)
Dept: RADIOLOGY | Facility: HOSPITAL | Age: 65
Discharge: HOME | End: 2025-07-24
Attending: INTERNAL MEDICINE
Payer: COMMERCIAL

## 2025-07-24 DIAGNOSIS — Z87.891 PERSONAL HISTORY OF TOBACCO USE: ICD-10-CM

## 2025-07-24 DIAGNOSIS — Z12.31 ENCOUNTER FOR SCREENING MAMMOGRAM FOR MALIGNANT NEOPLASM OF BREAST: ICD-10-CM

## 2025-07-24 PROCEDURE — 77063 BREAST TOMOSYNTHESIS BI: CPT | Mod: 26,,, | Performed by: RADIOLOGY

## 2025-07-24 PROCEDURE — 77067 SCR MAMMO BI INCL CAD: CPT | Mod: 26,,, | Performed by: RADIOLOGY

## 2025-07-24 PROCEDURE — 71271 CT THORAX LUNG CANCER SCR C-: CPT | Mod: TC,PO

## 2025-07-24 PROCEDURE — 77063 BREAST TOMOSYNTHESIS BI: CPT | Mod: TC,PO

## 2025-07-24 PROCEDURE — 71271 CT THORAX LUNG CANCER SCR C-: CPT | Mod: 26,,, | Performed by: RADIOLOGY

## 2025-07-29 ENCOUNTER — PATIENT MESSAGE (OUTPATIENT)
Dept: FAMILY MEDICINE | Facility: CLINIC | Age: 65
End: 2025-07-29
Payer: COMMERCIAL

## 2025-07-29 DIAGNOSIS — R91.1 SOLITARY PULMONARY NODULE: Primary | ICD-10-CM

## 2025-08-01 ENCOUNTER — OFFICE VISIT (OUTPATIENT)
Dept: FAMILY MEDICINE | Facility: CLINIC | Age: 65
End: 2025-08-01
Payer: COMMERCIAL

## 2025-08-01 VITALS
RESPIRATION RATE: 20 BRPM | WEIGHT: 212.5 LBS | TEMPERATURE: 98 F | OXYGEN SATURATION: 97 % | HEIGHT: 68 IN | SYSTOLIC BLOOD PRESSURE: 128 MMHG | HEART RATE: 86 BPM | DIASTOLIC BLOOD PRESSURE: 96 MMHG | BODY MASS INDEX: 32.21 KG/M2

## 2025-08-01 DIAGNOSIS — F43.21 GRIEF AT LOSS OF CHILD: ICD-10-CM

## 2025-08-01 DIAGNOSIS — Z63.4 GRIEF AT LOSS OF CHILD: ICD-10-CM

## 2025-08-01 DIAGNOSIS — R91.1 PULMONARY NODULE: ICD-10-CM

## 2025-08-01 DIAGNOSIS — F33.1 MAJOR DEPRESSIVE DISORDER, RECURRENT EPISODE, MODERATE: Primary | ICD-10-CM

## 2025-08-01 SDOH — SOCIAL DETERMINANTS OF HEALTH (SDOH): DISSAPEARANCE AND DEATH OF FAMILY MEMBER: Z63.4

## 2025-08-01 NOTE — PROGRESS NOTES
Subjective:       Patient ID: Tanya Espinoza is a 65 y.o. female.    Medication List with Changes/Refills   Current Medications    ALPRAZOLAM (XANAX) 0.5 MG TABLET    Take 1 tablet (0.5 mg total) by mouth daily as needed for Anxiety.    ASPIRIN (ECOTRIN) 81 MG EC TABLET    Take 1 tablet (81 mg total) by mouth once daily.    ATORVASTATIN (LIPITOR) 20 MG TABLET    Take 1 tablet (20 mg total) by mouth every evening.    CETIRIZINE (ZYRTEC) 10 MG TABLET    Take 1 tablet (10 mg total) by mouth nightly.    DAYVIGO 10 MG TAB    Take 1 tablet by mouth every evening.    ESTRADIOL (ESTRACE) 0.01 % (0.1 MG/GRAM) VAGINAL CREAM    Place 1 fingertip of cream first at urethral opening and then external vagina every other day. Please do not use plastic applicator    FEXOFENADINE (ALLEGRA) 180 MG TABLET    Take 1 tablet (180 mg total) by mouth every morning.    FLUOXETINE 20 MG CAPSULE    Take 1 capsule (20 mg total) by mouth once daily.    FUROSEMIDE (LASIX) 40 MG TABLET    Take 40 mg by mouth as needed.    MULTIVITAMIN WITH MINERALS TABLET    Take 1 tablet by mouth once daily.    SACUBITRIL-VALSARTAN (ENTRESTO) 24-26 MG PER TABLET    Take 1 tablet by mouth 2 (two) times daily.    TRIAMCINOLONE ACETONIDE 0.1% (KENALOG) 0.1 % CREAM    Apply twice daily to affected areas of skin until improved, then can stop and restart as needed       Chief Complaint: Follow-up  She is here to f/u on depression from her appt on 7/2/2025.     She is having uncontrolled depression due to loss of her daughter due to drug overdose.  At her appt one month ago her dose of fluoxetine was increased from 10 mg daily to 20 mg daily.  She is tolerating this increase well but does not feel it made much of a difference. She is still overwhelmed by grief. She struggles to even get out of the bed. She did make one trip to her sisters this month.  She is over sleeping.  She is eating normally. She cries easily. She denies suicidal ideations or any fatalistic  "thoughts. She has tried to get in to counseling but there was a long wait.      CT chest done on 7/2024 showed new pulmonary nodules. F/u CT was ordered in 6 months but has not yet been scheduled.     Review of Systems   Constitutional:  Negative for activity change, appetite change, chills, fatigue and fever.   HENT:  Negative for congestion, ear discharge, ear pain, mouth sores, postnasal drip, rhinorrhea, sinus pressure and sore throat.    Eyes:  Negative for pain, discharge and redness.   Respiratory:  Negative for cough, chest tightness, shortness of breath and wheezing.    Gastrointestinal:  Negative for abdominal pain, constipation, diarrhea, nausea and vomiting.   Genitourinary:  Negative for dysuria.   Musculoskeletal:  Negative for arthralgias and neck stiffness.   Skin:  Negative for rash.   Neurological:  Negative for headaches.   Hematological:  Negative for adenopathy.   Psychiatric/Behavioral:  Positive for dysphoric mood and sleep disturbance. Negative for suicidal ideas. The patient is not nervous/anxious.        Objective:      Vitals:    08/01/25 0725   BP: (!) 128/96   BP Location: Left arm   Patient Position: Sitting   Pulse: 86   Resp: 20   Temp: 98.4 °F (36.9 °C)   TempSrc: Temporal   SpO2: 97%   Weight: 96.4 kg (212 lb 8.4 oz)   Height: 5' 8" (1.727 m)     Body mass index is 32.31 kg/m².  Physical Exam    Alert, no distress  Crying during the visit, good eye contact     Assessment:       1. Major depressive disorder, recurrent episode, moderate    2. Grief at loss of child    3. Pulmonary nodule        Plan:       Major depressive disorder, recurrent episode, moderate  Continue this dose of fluoxetine at this time. Will get her into clinical counseling for evaluation.  Consider increasing the dose based on this evaluation.   -     Ambulatory referral/consult to Primary Care Behavioral Health (Non-Opioids); Future; Expected date: 08/08/2025    Grief at loss of child  Referral to clinical " counseling.   -     Ambulatory referral/consult to Primary Care Behavioral Health (Non-Opioids); Future; Expected date: 08/08/2025    Pulmonary nodule  New lesion and needs repeat CT in 1/2026. Scheduled today.     Follow up in about 4 months (around 12/1/2025) for chronic medical issues, already scheduled.     30 minutes of total time spent on the encounter, which includes face to face time and non-face to face time preparing to see the patient (eg, review of tests), Obtaining and/or reviewing separately obtained history, documenting clinical information in the electronic or other health record, independently interpreting results (not separately reported) and communicating results to the patient/family/caregiver, or Care coordination (not separately reported).

## 2025-08-01 NOTE — PATIENT INSTRUCTIONS
AMBULATORY REFERRAL TO [facility]      Call [phone] Opt 2    Like to be seen with Dr. Paulino at [facility]

## 2025-08-03 DIAGNOSIS — F51.01 PRIMARY INSOMNIA: ICD-10-CM

## 2025-08-03 NOTE — TELEPHONE ENCOUNTER
No care due was identified.  [facility] Embedded Care Due Messages. Reference number: [id number].   8/03/2025 11:55:57 AM CDT

## 2025-08-04 ENCOUNTER — PATIENT MESSAGE (OUTPATIENT)
Dept: PSYCHIATRY | Facility: CLINIC | Age: 65
End: 2025-08-04
Payer: COMMERCIAL

## 2025-08-05 RX ORDER — LEMBOREXANT 10 MG/1
1 TABLET, FILM COATED ORAL NIGHTLY
Qty: 30 TABLET | Refills: 5 | Status: SHIPPED | OUTPATIENT
Start: 2025-08-05

## 2025-08-14 ENCOUNTER — OFFICE VISIT (OUTPATIENT)
Dept: CARDIOLOGY | Facility: CLINIC | Age: 65
End: 2025-08-14
Payer: COMMERCIAL

## 2025-08-14 VITALS
HEART RATE: 96 BPM | DIASTOLIC BLOOD PRESSURE: 81 MMHG | BODY MASS INDEX: 32.27 KG/M2 | SYSTOLIC BLOOD PRESSURE: 139 MMHG | HEIGHT: 68 IN | WEIGHT: 212.94 LBS

## 2025-08-14 DIAGNOSIS — I44.7 LBBB (LEFT BUNDLE BRANCH BLOCK): Primary | ICD-10-CM

## 2025-08-14 DIAGNOSIS — Z72.0 TOBACCO ABUSE: ICD-10-CM

## 2025-08-14 DIAGNOSIS — I48.0 PAROXYSMAL ATRIAL FIBRILLATION: ICD-10-CM

## 2025-08-14 DIAGNOSIS — F51.04 PSYCHOPHYSIOLOGICAL INSOMNIA: ICD-10-CM

## 2025-08-14 DIAGNOSIS — L02.411 ABSCESS OF RIGHT AXILLA: ICD-10-CM

## 2025-08-14 PROCEDURE — 4010F ACE/ARB THERAPY RXD/TAKEN: CPT | Mod: CPTII,S$GLB,, | Performed by: INTERNAL MEDICINE

## 2025-08-14 PROCEDURE — 1159F MED LIST DOCD IN RCRD: CPT | Mod: CPTII,S$GLB,, | Performed by: INTERNAL MEDICINE

## 2025-08-14 PROCEDURE — 3079F DIAST BP 80-89 MM HG: CPT | Mod: CPTII,S$GLB,, | Performed by: INTERNAL MEDICINE

## 2025-08-14 PROCEDURE — 99214 OFFICE O/P EST MOD 30 MIN: CPT | Mod: S$GLB,,, | Performed by: INTERNAL MEDICINE

## 2025-08-14 PROCEDURE — 3044F HG A1C LEVEL LT 7.0%: CPT | Mod: CPTII,S$GLB,, | Performed by: INTERNAL MEDICINE

## 2025-08-14 PROCEDURE — 3075F SYST BP GE 130 - 139MM HG: CPT | Mod: CPTII,S$GLB,, | Performed by: INTERNAL MEDICINE

## 2025-08-14 PROCEDURE — 99999 PR PBB SHADOW E&M-EST. PATIENT-LVL III: CPT | Mod: PBBFAC,,, | Performed by: INTERNAL MEDICINE

## 2025-08-14 PROCEDURE — 3008F BODY MASS INDEX DOCD: CPT | Mod: CPTII,S$GLB,, | Performed by: INTERNAL MEDICINE

## 2025-08-18 ENCOUNTER — TELEPHONE (OUTPATIENT)
Dept: PSYCHIATRY | Facility: CLINIC | Age: 65
End: 2025-08-18
Payer: COMMERCIAL

## 2025-08-18 ENCOUNTER — PATIENT MESSAGE (OUTPATIENT)
Dept: PSYCHIATRY | Facility: CLINIC | Age: 65
End: 2025-08-18
Payer: COMMERCIAL

## 2025-09-05 ENCOUNTER — HOSPITAL ENCOUNTER (OUTPATIENT)
Dept: CARDIOLOGY | Facility: HOSPITAL | Age: 65
Discharge: HOME | End: 2025-09-05
Attending: INTERNAL MEDICINE
Payer: COMMERCIAL

## 2025-09-05 VITALS — BODY MASS INDEX: 32.23 KG/M2 | WEIGHT: 212 LBS

## 2025-09-05 DIAGNOSIS — I44.7 LBBB (LEFT BUNDLE BRANCH BLOCK): ICD-10-CM

## 2025-09-05 DIAGNOSIS — I48.0 PAROXYSMAL ATRIAL FIBRILLATION: ICD-10-CM

## 2025-09-05 LAB
ASCENDING AORTA: 3.2 CM
AV INDEX (PROSTH): 0.82
AV MEAN GRADIENT: 4 MMHG
AV PEAK GRADIENT: 7 MMHG
AV VALVE AREA BY VELOCITY RATIO: 2.4 CM²
AV VALVE AREA: 2.6 CM²
AV VELOCITY RATIO: 0.77
CV ECHO LV RWT: 0.48 CM
DOP CALC AO PEAK VEL: 1.3 M/S
DOP CALC AO VTI: 25.2 CM
DOP CALC LVOT AREA: 3.1 CM2
DOP CALC LVOT DIAMETER: 2 CM
DOP CALC LVOT PEAK VEL: 1 M/S
DOP CALCLVOT PEAK VEL VTI: 20.7 CM
E WAVE DECELERATION TIME: 206 MSEC
E/A RATIO: 0.78
E/E' RATIO: 5 M/S
ECHO LV POSTERIOR WALL: 1 CM (ref 0.6–1.1)
FRACTIONAL SHORTENING: 28.6 % (ref 28–44)
INTERVENTRICULAR SEPTUM: 1.5 CM (ref 0.6–1.1)
IVRT: 94 MSEC
LEFT ATRIUM AREA SYSTOLIC (APICAL 2 CHAMBER): 13.02 CM2
LEFT ATRIUM AREA SYSTOLIC (APICAL 4 CHAMBER): 17.21 CM2
LEFT ATRIUM SIZE: 3.7 CM
LEFT ATRIUM VOLUME MOD: 38 ML
LEFT INTERNAL DIMENSION IN SYSTOLE: 3 CM (ref 2.1–4)
LEFT VENTRICLE DIASTOLIC VOLUME: 79 ML
LEFT VENTRICLE END SYSTOLIC VOLUME APICAL 2 CHAMBER: 29.18 ML
LEFT VENTRICLE END SYSTOLIC VOLUME APICAL 4 CHAMBER: 43.93 ML
LEFT VENTRICLE SYSTOLIC VOLUME: 34 ML
LEFT VENTRICULAR INTERNAL DIMENSION IN DIASTOLE: 4.2 CM (ref 3.5–6)
LEFT VENTRICULAR MASS: 189.2 G
LV LATERAL E/E' RATIO: 4.8 M/S
LV SEPTAL E/E' RATIO: 5.4 M/S
LVED V (TEICH): 79 ML
LVES V (TEICH): 34.11 ML
LVOT MG: 1.82 MMHG
LVOT MV: 0.59 CM/S
MV PEAK A VEL: 0.55 M/S
MV PEAK E VEL: 0.43 M/S
MV STENOSIS PRESSURE HALF TIME: 59.84 MS
MV VALVE AREA P 1/2 METHOD: 3.68 CM2
OHS CV RV/LV RATIO: 0.76 CM
PISA TR MAX VEL: 2.1 M/S
PULM VEIN S/D RATIO: 1.42
PV PEAK D VEL: 0.33 M/S
PV PEAK S VEL: 0.47 M/S
RIGHT VENTRICLE DIASTOLIC BASEL DIMENSION: 3.2 CM
RIGHT VENTRICLE DIASTOLIC LENGTH: 4.6 CM
RIGHT VENTRICLE DIASTOLIC MID DIMENSION: 1.7 CM
RIGHT VENTRICULAR END-DIASTOLIC DIMENSION: 3.17 CM
RIGHT VENTRICULAR LENGTH IN DIASTOLE (APICAL 4-CHAMBER VIEW): 4.58 CM
RV MID DIAMA: 1.68 CM
RV TISSUE DOPPLER FREE WALL SYSTOLIC VELOCITY 1 (APICAL 4 CHAMBER VIEW): 9.31 CM/S
SINUS: 2.8 CM
STJ: 2.8 CM
TDI LATERAL: 0.09 M/S
TDI SEPTAL: 0.08 M/S
TDI: 0.09 M/S
TR MAX PG: 17 MMHG
TRICUSPID ANNULAR PLANE SYSTOLIC EXCURSION: 1.2 CM

## 2025-09-05 PROCEDURE — 93306 TTE W/DOPPLER COMPLETE: CPT | Mod: PO
